# Patient Record
Sex: MALE | Race: BLACK OR AFRICAN AMERICAN | NOT HISPANIC OR LATINO | Employment: OTHER | URBAN - METROPOLITAN AREA
[De-identification: names, ages, dates, MRNs, and addresses within clinical notes are randomized per-mention and may not be internally consistent; named-entity substitution may affect disease eponyms.]

---

## 2017-01-24 PROBLEM — L72.0 EPIDERMAL CYST OF FACE: Status: ACTIVE | Noted: 2017-01-24

## 2017-02-10 PROBLEM — L72.0 EPIDERMAL CYST OF FACE: Status: RESOLVED | Noted: 2017-01-24 | Resolved: 2017-02-10

## 2020-10-25 ENCOUNTER — OFFICE VISIT (OUTPATIENT)
Dept: URGENT CARE | Facility: CLINIC | Age: 45
End: 2020-10-25
Payer: COMMERCIAL

## 2020-10-25 VITALS
OXYGEN SATURATION: 100 % | TEMPERATURE: 97 F | HEIGHT: 70 IN | HEART RATE: 84 BPM | RESPIRATION RATE: 18 BRPM | SYSTOLIC BLOOD PRESSURE: 155 MMHG | WEIGHT: 205 LBS | DIASTOLIC BLOOD PRESSURE: 103 MMHG | BODY MASS INDEX: 29.35 KG/M2

## 2020-10-25 DIAGNOSIS — M54.41 ACUTE MIDLINE LOW BACK PAIN WITH RIGHT-SIDED SCIATICA: Primary | ICD-10-CM

## 2020-10-25 DIAGNOSIS — M54.9 DORSALGIA, UNSPECIFIED: ICD-10-CM

## 2020-10-25 PROCEDURE — 72100 X-RAY EXAM L-S SPINE 2/3 VWS: CPT | Mod: S$GLB,,, | Performed by: RADIOLOGY

## 2020-10-25 PROCEDURE — 96372 PR INJECTION,THERAP/PROPH/DIAG2ST, IM OR SUBCUT: ICD-10-PCS | Mod: S$GLB,,, | Performed by: FAMILY MEDICINE

## 2020-10-25 PROCEDURE — 99204 PR OFFICE/OUTPT VISIT, NEW, LEVL IV, 45-59 MIN: ICD-10-PCS | Mod: 25,S$GLB,, | Performed by: FAMILY MEDICINE

## 2020-10-25 PROCEDURE — 99204 OFFICE O/P NEW MOD 45 MIN: CPT | Mod: 25,S$GLB,, | Performed by: FAMILY MEDICINE

## 2020-10-25 PROCEDURE — 72100 XR LUMBAR SPINE 2 OR 3 VIEWS: ICD-10-PCS | Mod: S$GLB,,, | Performed by: RADIOLOGY

## 2020-10-25 PROCEDURE — 96372 THER/PROPH/DIAG INJ SC/IM: CPT | Mod: S$GLB,,, | Performed by: FAMILY MEDICINE

## 2020-10-25 RX ORDER — KETOROLAC TROMETHAMINE 30 MG/ML
30 INJECTION, SOLUTION INTRAMUSCULAR; INTRAVENOUS ONCE
Status: COMPLETED | OUTPATIENT
Start: 2020-10-25 | End: 2020-10-25

## 2020-10-25 RX ORDER — TRAMADOL HYDROCHLORIDE 50 MG/1
50 TABLET ORAL EVERY 6 HOURS PRN
Qty: 20 TABLET | Refills: 0 | Status: SHIPPED | OUTPATIENT
Start: 2020-10-25 | End: 2021-10-11 | Stop reason: CLARIF

## 2020-10-25 RX ORDER — NAPROXEN 500 MG/1
500 TABLET ORAL 2 TIMES DAILY WITH MEALS
Qty: 20 TABLET | Refills: 0 | Status: SHIPPED | OUTPATIENT
Start: 2020-10-25 | End: 2020-11-11 | Stop reason: ALTCHOICE

## 2020-10-25 RX ORDER — CHLORZOXAZONE 500 MG/1
500 TABLET ORAL 4 TIMES DAILY PRN
Qty: 40 TABLET | Refills: 0 | Status: SHIPPED | OUTPATIENT
Start: 2020-10-25 | End: 2020-11-04

## 2020-10-25 RX ADMIN — KETOROLAC TROMETHAMINE 30 MG: 30 INJECTION, SOLUTION INTRAMUSCULAR; INTRAVENOUS at 11:10

## 2020-10-25 NOTE — PROGRESS NOTES
Subjective:       Patient ID: Junior Garay is a 45 y.o. male.    Chief Complaint: Back Pain    Back Pain  This is a new problem. The current episode started yesterday. The problem occurs constantly. The problem has been gradually worsening since onset. The pain is present in the sacro-iliac. The quality of the pain is described as stabbing. The pain radiates to the right thigh. The pain is at a severity of 9/10. The pain is severe. The pain is the same all the time. The symptoms are aggravated by bending. Stiffness is present in the morning. Associated symptoms include abdominal pain (RLQ) and leg pain (right). Pertinent negatives include no bladder incontinence, bowel incontinence, chest pain, dysuria, fever, headaches, pelvic pain or tingling. He has tried nothing for the symptoms.       Constitution: Negative for fatigue and fever.   HENT: Negative for facial swelling and facial trauma.    Neck: Negative for neck stiffness.   Cardiovascular: Negative for chest trauma and chest pain.   Eyes: Negative for eye trauma, double vision and blurred vision.   Gastrointestinal: Positive for abdominal pain (RLQ). Negative for abdominal trauma, rectal bleeding and bowel incontinence.   Genitourinary: Negative for dysuria, bladder incontinence, hematuria, genital trauma and pelvic pain.   Musculoskeletal: Positive for back pain. Negative for pain, trauma, joint swelling, abnormal ROM of joint and pain with walking.   Skin: Negative for color change, wound, abrasion and laceration.   Neurological: Negative for dizziness, history of vertigo, light-headedness, coordination disturbances, headaches, altered mental status and loss of consciousness.   Hematologic/Lymphatic: Negative for history of bleeding disorder.   Psychiatric/Behavioral: Negative for altered mental status.        Objective:      Physical Exam  Vitals signs and nursing note reviewed.   Constitutional:       General: He is not in acute distress.      Appearance: Normal appearance. He is well-developed. He is not diaphoretic.   HENT:      Head: Normocephalic and atraumatic.      Nose: Nose normal.   Eyes:      General: Lids are normal.      Conjunctiva/sclera: Conjunctivae normal.   Neck:      Musculoskeletal: Full passive range of motion without pain, normal range of motion and neck supple.      Trachea: Trachea and phonation normal.   Cardiovascular:      Rate and Rhythm: Normal rate and regular rhythm.      Pulses: Normal pulses.      Heart sounds: Normal heart sounds.   Pulmonary:      Effort: Pulmonary effort is normal.      Breath sounds: Normal breath sounds.   Abdominal:      General: Bowel sounds are normal. There is no abdominal bruit.      Palpations: Abdomen is soft. There is no mass or pulsatile mass.   Musculoskeletal:         General: No deformity.      Lumbar back: He exhibits decreased range of motion, tenderness, pain and spasm.        Back:    Skin:     General: Skin is warm and dry.   Neurological:      Mental Status: He is alert and oriented to person, place, and time.      Sensory: No sensory deficit.      Deep Tendon Reflexes: Reflexes are normal and symmetric.   Psychiatric:         Speech: Speech normal.         Behavior: Behavior normal. Behavior is cooperative.         Thought Content: Thought content normal.         Judgment: Judgment normal.       Type of Interpretation: ED Physician (Independently Interpreted).  Radiology Procedure Done: Lumbar Spine X-Ray.  Interpretation: No fx, loss of disk space height L4 - S1        Assessment:       1. Acute midline low back pain with right-sided sciatica    2. Dorsalgia, unspecified        Plan:         Medications Ordered This Encounter   Medications    chlorzoxazone (PARAFON FORTE) 500 mg Tab     Sig: Take 1 tablet (500 mg total) by mouth 4 (four) times daily as needed.     Dispense:  40 tablet     Refill:  0    ketorolac injection 30 mg    naproxen (NAPROSYN) 500 MG tablet     Sig: Take  1 tablet (500 mg total) by mouth 2 (two) times daily with meals.     Dispense:  20 tablet     Refill:  0    traMADoL (ULTRAM) 50 mg tablet     Sig: Take 1 tablet (50 mg total) by mouth every 6 (six) hours as needed for Pain.     Dispense:  20 tablet     Refill:  0     Quantity prescribed more than 7 day supply? No     Please drink plenty of fluids.  Please get plenty of rest.  Please return here or go to the Emergency Department for any concerns or worsening of condition.  If you were prescribed a narcotic medication, do not drive or operate heavy equipment or machinery while taking these medications.  If you were not prescribed an anti-inflammatory medication, and if you do not have any history of stomach/intestinal ulcers, or kidney disease, or are not taking a blood thinner such as Coumadin, Plavix, Pradaxa, Eloquis, or Xaralta for example, it is OK to take over the counter Ibuprofen or Advil or Motrin or Aleve as directed.  Do not take these medications on an empty stomach.  If you lose control of your bowel and/or bladder, please go to the nearest Emergency Department immediately.  If you lose sensation in between your legs by your genitalia and/or rectum, please go to the nearest Emergency Department immediately.  If you lose control or sensation of any extremity, please go to the nearest Emergency Department immediately.    Moist heat (heating Pad) several times a day to back for relief and comfort.  If you  smoke, please stop smoking.    Please follow up with your primary care doctor or specialist as needed.  Mark Littlejohn MD  [phone]    You must understand that you have received treatment at an Urgent Care facility only, and that you may be  released before all of your medical problems are known or treated. Urgent Care facilities are not equipped to  handle life threatening emergencies. It is recommended that you seek care at an Emergency Department for  further evaluation of worsening or concerning  symptoms, or possibly life threatening conditions as  discussed.               Follow up in about 1 week (around 11/1/2020) for Recheck.

## 2020-11-01 ENCOUNTER — OFFICE VISIT (OUTPATIENT)
Dept: URGENT CARE | Facility: CLINIC | Age: 45
End: 2020-11-01
Payer: COMMERCIAL

## 2020-11-01 VITALS
TEMPERATURE: 97 F | DIASTOLIC BLOOD PRESSURE: 96 MMHG | HEART RATE: 77 BPM | BODY MASS INDEX: 29.41 KG/M2 | OXYGEN SATURATION: 99 % | SYSTOLIC BLOOD PRESSURE: 140 MMHG | WEIGHT: 205 LBS

## 2020-11-01 DIAGNOSIS — M54.9 MUSCULOSKELETAL BACK PAIN: Primary | ICD-10-CM

## 2020-11-01 PROCEDURE — 99214 PR OFFICE/OUTPT VISIT, EST, LEVL IV, 30-39 MIN: ICD-10-PCS | Mod: S$GLB,,, | Performed by: NURSE PRACTITIONER

## 2020-11-01 PROCEDURE — 99214 OFFICE O/P EST MOD 30 MIN: CPT | Mod: S$GLB,,, | Performed by: NURSE PRACTITIONER

## 2020-11-01 NOTE — PATIENT INSTRUCTIONS
Continue medications prescribed on 10/25/2020. You may return to work on 11/03/2020. A referral has been ordered for you to follow up with a primary care provider.     BACK PAIN    Alternate heat and ice for first 48 hours then  apply heat. You may do gently stretching if tolerable.    Moist warm compresss to area several times daily.  May use a heating pad on LOW to provide heat over a towel which was dampended with warm water. DO NOT FALL ASLEEP WITH HEATING PAD ON.  Do not stay in one position to long.  When sleeping on your back place a pillow under knees to reduce tension on back.  If sleeping on your side, place pillow between knees to keep spine in better alinement.  Wear supportive shoes such as tennis shoes for support of the lower back.  Take any medication as directed.    If you were not prescribed an anti-inflammatory medication, and if you do not have any history of stomach/intestinal ulcers, or kidney disease, or are not taking a blood thinner such as Coumadin, Plavix, Pradaxa, Eloquis, or Xaralta for example, it is OK to take over the counter Ibuprofen or Advil or Motrin or Aleve as directed.  Do not take these medications on an empty stomach.    If you were prescribed a narcotic medication, do not drive or operate heavy equipment or machinery while taking these medications.    If you lose control of your bowel and/or bladder, please go to the nearest Emergency Department immediately.  If you lose sensation in between your legs by your genitalia and/or rectum, please go to the nearest Emergency Department immediately.  If you lose control or sensation of any extremity, please go to the nearest Emergency Department immediately.

## 2020-11-01 NOTE — PROGRESS NOTES
Subjective:       Patient ID: Junior Garay is a 45 y.o. male.    Vitals:  weight is 93 kg (205 lb). His oral temperature is 97.2 °F (36.2 °C). His blood pressure is 140/96 (abnormal) and his pulse is 77. His oxygen saturation is 99%.     Chief Complaint: Back Pain    45 year old male reports he is here for a follow up visit. Pt is complaining of no improvement in back pain and states he would like something stronger than tramadol.     Back Pain  This is a new problem. Episode onset: 1 week. The problem occurs constantly. The problem is unchanged. The pain is present in the lumbar spine. Quality: pins. Radiates to: right leg. The pain is at a severity of 7/10. The pain is moderate. The symptoms are aggravated by lying down (walking). Associated symptoms include abdominal pain (RLQ pain) and tingling. Pertinent negatives include no bladder incontinence, bowel incontinence, dysuria, fever, headaches, leg pain or numbness. Treatments tried: tramadol. The treatment provided mild relief.       Constitution: Negative for fatigue and fever.   Gastrointestinal: Positive for abdominal pain (RLQ pain). Negative for bowel incontinence.   Genitourinary: Negative for dysuria, urgency, bladder incontinence and hematuria.   Musculoskeletal: Positive for pain, back pain and pain with walking. Negative for muscle cramps and history of spine disorder.   Skin: Negative for rash.   Neurological: Positive for tingling. Negative for coordination disturbances, headaches and numbness.       Objective:      Physical Exam   Constitutional: He is oriented to person, place, and time. He appears well-developed. He is cooperative.  Non-toxic appearance. He does not appear ill. No distress.   HENT:   Head: Normocephalic and atraumatic.   Ears:   Right Ear: External ear normal.   Left Ear: External ear normal.   Nose: Nose normal.   Mouth/Throat: Oropharynx is clear and moist and mucous membranes are normal.   Eyes: Conjunctivae and lids are  normal.   Neck: Trachea normal, normal range of motion, full passive range of motion without pain and phonation normal. Neck supple. No muscular tenderness present. No neck rigidity.   Cardiovascular: Normal rate, regular rhythm, normal heart sounds and normal pulses.   Pulmonary/Chest: Effort normal and breath sounds normal. No accessory muscle usage. No respiratory distress.   Abdominal: Soft. Normal appearance and bowel sounds are normal. He exhibits no distension, no abdominal bruit, no pulsatile midline mass and no mass. There is no abdominal tenderness. There is no rebound, no guarding, no tenderness at McBurney's point, no left CVA tenderness and no right CVA tenderness. No hernia.       Musculoskeletal:         General: No deformity.        Back:    Lymphadenopathy:     He has no cervical adenopathy.   Neurological: He is alert and oriented to person, place, and time. He has normal strength and normal reflexes. He displays no weakness and normal reflexes. No sensory deficit. Coordination and gait normal.   Skin: Skin is warm, dry, intact and not diaphoretic. Psychiatric: His speech is normal and behavior is normal. Judgment and thought content normal.   Nursing note and vitals reviewed.        Assessment:       1. Musculoskeletal back pain        Plan:         Musculoskeletal back pain  -     Ambulatory referral/consult to [facility]       Patient presenting for follow up of acute onset back pain .  Xrays reviewed, no evidence for acute pathology.  The patient did not have any urinary incontinence, bowel incontinence, saddle anesthesia, fever, or weight loss that would advanced warrant imaging. Patient was advised to followup with Physical therapy or primary physician if continuing to have back pain. Advised to present to ER if signs of cauda equina or spinal infection develop including loss of bowel or bladder function, peripheral numbness/weakness/tingling, significant fevers, or other concerns. At  time of discharge patient able to ambulate and vitals stable.      Patient Instructions   Continue medications prescribed on 10/25/2020. You may return to work on 11/03/2020. A referral has been ordered for you to follow up with a primary care provider.     BACK PAIN    Alternate heat and ice for first 48 hours then  apply heat. You may do gently stretching if tolerable.    Moist warm compresss to area several times daily.  May use a heating pad on LOW to provide heat over a towel which was dampended with warm water. DO NOT FALL ASLEEP WITH HEATING PAD ON.  Do not stay in one position to long.  When sleeping on your back place a pillow under knees to reduce tension on back.  If sleeping on your side, place pillow between knees to keep spine in better alinement.  Wear supportive shoes such as tennis shoes for support of the lower back.  Take any medication as directed.    If you were not prescribed an anti-inflammatory medication, and if you do not have any history of stomach/intestinal ulcers, or kidney disease, or are not taking a blood thinner such as Coumadin, Plavix, Pradaxa, Eloquis, or Xaralta for example, it is OK to take over the counter Ibuprofen or Advil or Motrin or Aleve as directed.  Do not take these medications on an empty stomach.    If you were prescribed a narcotic medication, do not drive or operate heavy equipment or machinery while taking these medications.    If you lose control of your bowel and/or bladder, please go to the nearest Emergency Department immediately.  If you lose sensation in between your legs by your genitalia and/or rectum, please go to the nearest Emergency Department immediately.  If you lose control or sensation of any extremity, please go to the nearest Emergency Department immediately.

## 2020-11-11 ENCOUNTER — OFFICE VISIT (OUTPATIENT)
Dept: URGENT CARE | Facility: CLINIC | Age: 45
End: 2020-11-11
Payer: COMMERCIAL

## 2020-11-11 VITALS
HEIGHT: 70 IN | HEART RATE: 99 BPM | SYSTOLIC BLOOD PRESSURE: 140 MMHG | TEMPERATURE: 98 F | OXYGEN SATURATION: 98 % | WEIGHT: 205 LBS | RESPIRATION RATE: 16 BRPM | DIASTOLIC BLOOD PRESSURE: 100 MMHG | BODY MASS INDEX: 29.35 KG/M2
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
## 2020-11-11 DIAGNOSIS — M54.41 ACUTE LOW BACK PAIN WITH RIGHT-SIDED SCIATICA, UNSPECIFIED BACK PAIN LATERALITY: Primary | ICD-10-CM

## 2020-11-11 PROCEDURE — 99214 PR OFFICE/OUTPT VISIT, EST, LEVL IV, 30-39 MIN: ICD-10-PCS | Mod: S$GLB,,, | Performed by: PHYSICIAN ASSISTANT

## 2020-11-11 PROCEDURE — 99214 OFFICE O/P EST MOD 30 MIN: CPT | Mod: S$GLB,,, | Performed by: PHYSICIAN ASSISTANT

## 2020-11-11 RX ORDER — HYDROCODONE BITARTRATE AND ACETAMINOPHEN 10; 325 MG/1; MG/1
1 TABLET ORAL EVERY 6 HOURS PRN
Qty: 20 TABLET | Refills: 0 | Status: SHIPPED | OUTPATIENT
Start: 2020-11-11 | End: 2020-11-18

## 2020-11-11 RX ORDER — METHYLPREDNISOLONE 4 MG/1
TABLET ORAL
Qty: 1 PACKAGE | Refills: 0 | Status: SHIPPED | OUTPATIENT
Start: 2020-11-11 | End: 2020-11-25 | Stop reason: SINTOL

## 2020-11-11 RX ORDER — DICLOFENAC SODIUM 75 MG/1
75 TABLET, DELAYED RELEASE ORAL 2 TIMES DAILY
Qty: 30 TABLET | Refills: 0 | Status: SHIPPED | OUTPATIENT
Start: 2020-11-17 | End: 2020-11-25 | Stop reason: SINTOL

## 2020-11-11 RX ORDER — TIZANIDINE 4 MG/1
4 TABLET ORAL 2 TIMES DAILY PRN
Qty: 20 TABLET | Refills: 0 | Status: SHIPPED | OUTPATIENT
Start: 2020-11-11 | End: 2020-11-25 | Stop reason: SDUPTHER

## 2020-11-11 NOTE — PATIENT INSTRUCTIONS
Sciatica    Sciatica is a condition that causes pain in the lower back that spreads down into the buttock, hip, and leg. Sometimes the leg pain can happen without any back pain. Sciatica happens when a spinal nerve is irritated or has pressure put on it as comes out of the spinal canal in the lower back. This most often happens when a bulge or rupture of a nearby spinal disk presses on the nerve. Sciatica can also be caused by a narrowing of the spinal canal (spinal stenosis) or spasm of the muscle in the buttocks that the sciatic nerve passes through (pyriform muscle). Sciatica is also called lumbar radiculopathy.  Sciatica may begin after a sudden twisting or bending force, such as in a car accident. Or it can happen after a simple awkward movement. In either case, muscle spasm often also happens. Muscle spasm makes the pain worse.  A healthcare provider makes a diagnosis of sciatica from your symptoms and a physical exam. Unless you had an injury from a car accident or fall, you usually wont have X-rays taken at this time. This is because the nerves and disks in your back cant be seen on an X-ray. If the provider sees signs of a compressed nerve, you will need to schedule an MRI scan as an outpatient. Signs of a compressed nerve include loss of strength in a leg.  Most sciatica gets better with medicine, exercise, and physical therapy. If your symptoms continue after at least 3 months of medical treatment, you may need surgery or injections to your lower back.  Home care  Follow these tips when caring for yourself at home:  · You may need to stay in bed the first few days. But as soon as possible, begin sitting up or walking. This will help you avoid problems that come from staying in bed for long periods.  · When in bed, try to find a position that is comfortable. A firm mattress is best. Try lying flat on your back with pillows under your knees. You can also try lying on your side with your knees bent up  toward your chest and a pillow between your knees.  · Avoid sitting for long periods. This puts more stress on your lower back than standing or walking.  · Use heat from a hot shower, hot bath, or heating pad to help ease pain. Massage can also help. You can also try using an ice pack. You can make your own ice pack by putting ice cubes in a plastic bag. Wrap the bag in a thin towel. Try both heat and cold to see which works best. Use the method that feels best for 20 minutes several times a day.  · You may use acetaminophen or ibuprofen to ease pain, unless another pain medicine was prescribed. Note: If you have chronic liver or kidney disease, talk with your healthcare provider before taking these medicines. Also talk with your provider if youve had a stomach ulcer or gastrointestinal bleeding.  · Use safe lifting methods. Dont lift anything heavier than 15 pounds until all of the pain is gone.  Follow-up care  Follow up with your healthcare provider, or as advised. You may need physical therapy or additional tests.  If X-rays were taken, a radiologist will look at them. You will be told of any new findings that may affect your care.  When to seek medical advice  Call your healthcare provider right away if any of these occur:  · Pain gets worse even after taking prescribed medicine  · Weakness or numbness in 1 or both legs or hips  · Numbness in your groin or genital area  · You cant control your bowel or bladder  · Fever  · Redness or swelling over your back or spine   Date Last Reviewed: 8/1/2016  © 9023-8158 [employer]. [address] [place] [address], [place] [address]. All rights reserved. This information is not intended as a substitute for professional medical care. Always follow your healthcare professional's instructions.        Back Exercises: Back Press    Do this exercise on your hands and knees. Keep your knees under your hips and your hands under your shoulders. Keep your spine in a neutral  position (not arched or sagging). Be sure to maintain your necks natural curve:  · Tighten your stomach and buttock muscles to press your back upward. Let your head drop slightly.  · Hold for 5 seconds. Return to starting position.  · Repeat 5 times.  Date Last Reviewed: 10/11/2015  © 0499-4178 [employer]. [address] [place] [address]. All rights reserved. This information is not intended as a substitute for professional medical care. Always follow your healthcare professional's instructions.        Back Exercises: Lower Back Stretch    To start, sit in a chair with your feet flat on the floor. Shift your weight slightly forward. Relax, and keep your ears, shoulders, and hips aligned.  · Sit with your feet well apart.  · Bend forward and touch the floor with the backs of your hands. Relax and let your body drop.  · Hold for 20 seconds. Return to starting position.  · Repeat 2 times.   Date Last Reviewed: 8/16/2015  © 1940-0276 [employer]. [address] [place] [address]. All rights reserved. This information is not intended as a substitute for professional medical care. Always follow your healthcare professional's instructions.        Back Exercises: Leg Pull    To start, lie on your back with your knees bent and feet flat on the floor. Dont press your neck or lower back to the floor. Breathe deeply. You should feel comfortable and relaxed in this position.  · Pull one knee to your chest.  · Hold for 30 to 60 seconds. Return to starting position.  · Repeat 2 times.  · Switch legs.  · For a double leg pull, pull both legs to your chest at the same time. Repeat 2 times.  For your safety, check with your healthcare provider before starting an exercise program.   Date Last Reviewed: 8/16/2015  © 5861-6732 [employer]. [address] [place] [address]. All rights reserved. This information is not intended as a substitute for professional  medical care. Always follow your healthcare professional's instructions.

## 2020-11-11 NOTE — LETTER
[facility]  [address] [place] [address]  Phone: [phone]  Fax: [phone]  [facility]: [phone]    Pt Name: Junior Garay  Injury Date: 10/24/2020   Employee ID: [id number] Date of First Treatment: 11/11/2020   Company: [employer]      Appointment Time: 12:45 PM Arrived: 1:23 p.m.   Provider: Yair Crain PA-C Time Out: 3:23 p.m.     Office Treatment:   1. Acute low back pain with right-sided sciatica, unspecified back pain laterality    2. Work related injury      Medications Ordered This Encounter   Medications    diclofenac (VOLTAREN) 75 MG EC tablet    HYDROcodone-acetaminophen (NORCO)  mg per tablet    methylPREDNISolone (MEDROL DOSEPACK) 4 mg tablet    tiZANidine (ZANAFLEX) 4 MG tablet      Patient Instructions: Daily home exercises/warm soaks, PT to be scheduled once authorized    Restrictions: Sedentary work only, Sit or stand as needed, No lifting/pushing/pulling more than 10 lbs, No Prolonged standing/walking     Return Appointment: 11/25/2020 at 9:30 a.m.  [place]

## 2020-11-11 NOTE — PROGRESS NOTES
Subjective:       Patient ID: Junior Garay is a 45 y.o. male.    Chief Complaint: Back Pain    Pt works for [employer] as a  . Pt states he was at work on 10/24/2020 he was at work lifting up a hot water heater by his self because he had no help and he felt a pain pain in his lower back. Pt states he was seen at [facility].  Pt is complaining of no improvement in back pain. Pain 7/10 constant and getting worse. He was given tramadol at the urgent care but states it does not help. IJ     Back Pain  This is a new problem. Episode onset: 10/24/2020. The problem occurs constantly. The problem has been gradually worsening since onset. The pain is present in the lumbar spine. Quality: pins. Radiates to: right leg. The pain is at a severity of 7/10. The pain is moderate. The pain is the same all the time. The symptoms are aggravated by lying down (walking). Stiffness is present all day. Associated symptoms include abdominal pain (RLQ pain), leg pain and tingling. Pertinent negatives include no bladder incontinence, bowel incontinence, chest pain, dysuria, fever, headaches or numbness. Treatments tried: tramadol. The treatment provided no relief.       Constitution: Negative for chills, fatigue and fever.   HENT: Negative for facial trauma.    Neck: Negative for neck pain and neck stiffness.   Cardiovascular: Negative for chest trauma and chest pain.   Eyes: Negative for eye trauma and eye pain.   Respiratory: Negative for shortness of breath.    Gastrointestinal: Positive for abdominal pain (RLQ pain). Negative for abdominal trauma and bowel incontinence.   Genitourinary: Negative for dysuria, urgency, bladder incontinence, hematuria and history of kidney stones.   Musculoskeletal: Positive for pain, back pain and pain with walking. Negative for joint pain, abnormal ROM of joint, muscle cramps and history of spine disorder.   Skin: Negative for rash and wound.   Neurological: Positive  for tingling. Negative for coordination disturbances, headaches and numbness.        Objective:      Physical Exam  Vitals signs and nursing note reviewed.   Constitutional:       General: He is not in acute distress.     Appearance: Normal appearance. He is well-developed.   HENT:      Head: Normocephalic and atraumatic.      Right Ear: Hearing and external ear normal.      Left Ear: Hearing and external ear normal.      Nose: Nose normal. No nasal deformity.   Eyes:      General: Lids are normal.      Conjunctiva/sclera: Conjunctivae normal.      Right eye: Right conjunctiva is not injected.      Left eye: Left conjunctiva is not injected.   Neck:      Musculoskeletal: Normal range of motion. No spinous process tenderness or muscular tenderness.      Trachea: Trachea normal.   Cardiovascular:      Pulses: Normal pulses.           Dorsalis pedis pulses are 2+ on the right side and 2+ on the left side.        Posterior tibial pulses are 2+ on the right side and 2+ on the left side.   Pulmonary:      Effort: Pulmonary effort is normal. No respiratory distress.      Breath sounds: No stridor.   Musculoskeletal:      Cervical back: Normal.      Thoracic back: Normal.      Lumbar back: He exhibits decreased range of motion and tenderness. He exhibits no deformity and normal pulse.        Back:    Skin:     General: Skin is warm and dry.      Findings: No abrasion or bruising.   Neurological:      Mental Status: He is alert.      GCS: GCS eye subscore is 4. GCS verbal subscore is 5. GCS motor subscore is 6.      Sensory: Sensation is intact. No sensory deficit.      Motor: Weakness (RLE hamstring curl 4/5) present.      Deep Tendon Reflexes: Reflexes are normal and symmetric.      Reflex Scores:       Patellar reflexes are 2+ on the right side and 2+ on the left side.       Achilles reflexes are 2+ on the right side and 2+ on the left side.     Comments: SLR positive RLE at 10 degrees, negative LLE   Psychiatric:          Attention and Perception: He is attentive.         Speech: Speech normal.         Behavior: Behavior normal.         Thought Content: Thought content normal.           X-ray Lumbar Spine 2 Or 3 Views    Result Date: 10/25/2020  EXAMINATION: XR LUMBAR SPINE 2 OR 3 VIEWS CLINICAL HISTORY: Back pain or radiculopathy, < 6 wks, uncomplicated;  Dorsalgia, unspecified TECHNIQUE: AP and lateral views as well as images are performed through the lumbar spine. COMPARISON: None FINDINGS: AP, lateral and coned down lateral radiographs of the lumbar spine reveal 5 non-rib bearing lumbar vertebral bodies with normal vertebral body heights and pedicles.  There is redemonstration of loss of intervertebral disc height at L4-L5 and L5-S1.  Anterior spondylosis noted at L4-L5.  There is straightening of the normal lumbar lordosis which is stable.  There is no malalignment, spondylolysis or spondylolisthesis.  There is no significant facet arthropathy.  The surrounding soft tissues are normal.     Stable degenerative disc disease from L4 through S1. Straightening of the normal lumbar lordosis can be seen in muscle spasm.  This is also stable. Electronically signed by: Rayna Gonzalez MD Date:    10/25/2020 Time:    11:05    Assessment:       1. Acute low back pain with right-sided sciatica, unspecified back pain laterality    2. Work related injury        Plan:         Medications Ordered This Encounter   Medications    diclofenac (VOLTAREN) 75 MG EC tablet     Sig: Take 1 tablet (75 mg total) by mouth 2 (two) times daily. Take with food.     Dispense:  30 tablet     Refill:  0     Begin taking once medrol pack completed.    HYDROcodone-acetaminophen (NORCO)  mg per tablet     Sig: Take 1 tablet by mouth every 6 (six) hours as needed for Pain.     Dispense:  20 tablet     Refill:  0     Quantity prescribed more than 7 day supply? No    methylPREDNISolone (MEDROL DOSEPACK) 4 mg tablet     Sig: use as directed     Dispense:  1  Package     Refill:  0    tiZANidine (ZANAFLEX) 4 MG tablet     Sig: Take 1 tablet (4 mg total) by mouth 2 (two) times daily as needed (muscle spasms). Take off duty only. May cause drowsiness.     Dispense:  20 tablet     Refill:  0     Patient Instructions: Daily home exercises/warm soaks, PT to be scheduled once authorized   Restrictions: Sedentary work only, Sit or stand as needed, No lifting/pushing/pulling more than 10 lbs, No Prolonged standing/walking  Follow up in about 2 weeks (around 11/25/2020).

## 2020-11-25 ENCOUNTER — OFFICE VISIT (OUTPATIENT)
Dept: URGENT CARE | Facility: CLINIC | Age: 45
End: 2020-11-25
Payer: COMMERCIAL
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
## 2020-11-25 DIAGNOSIS — M54.41 ACUTE LOW BACK PAIN WITH RIGHT-SIDED SCIATICA, UNSPECIFIED BACK PAIN LATERALITY: Primary | ICD-10-CM

## 2020-11-25 PROCEDURE — 96372 THER/PROPH/DIAG INJ SC/IM: CPT | Mod: S$GLB,,, | Performed by: PHYSICIAN ASSISTANT

## 2020-11-25 PROCEDURE — 99214 OFFICE O/P EST MOD 30 MIN: CPT | Mod: 25,S$GLB,, | Performed by: PHYSICIAN ASSISTANT

## 2020-11-25 PROCEDURE — 99214 PR OFFICE/OUTPT VISIT, EST, LEVL IV, 30-39 MIN: ICD-10-PCS | Mod: 25,S$GLB,, | Performed by: PHYSICIAN ASSISTANT

## 2020-11-25 PROCEDURE — 96372 PR INJECTION,THERAP/PROPH/DIAG2ST, IM OR SUBCUT: ICD-10-PCS | Mod: S$GLB,,, | Performed by: PHYSICIAN ASSISTANT

## 2020-11-25 RX ORDER — CELECOXIB 100 MG/1
100 CAPSULE ORAL 2 TIMES DAILY
Qty: 30 CAPSULE | Refills: 0 | Status: SHIPPED | OUTPATIENT
Start: 2020-11-25 | End: 2020-12-15

## 2020-11-25 RX ORDER — TIZANIDINE 4 MG/1
4 TABLET ORAL 2 TIMES DAILY PRN
Qty: 20 TABLET | Refills: 0 | Status: SHIPPED | OUTPATIENT
Start: 2020-11-25 | End: 2021-01-05 | Stop reason: ALTCHOICE

## 2020-11-25 RX ORDER — BETAMETHASONE SODIUM PHOSPHATE AND BETAMETHASONE ACETATE 3; 3 MG/ML; MG/ML
12 INJECTION, SUSPENSION INTRA-ARTICULAR; INTRALESIONAL; INTRAMUSCULAR; SOFT TISSUE
Status: COMPLETED | OUTPATIENT
Start: 2020-11-25 | End: 2020-11-25

## 2020-11-25 RX ADMIN — BETAMETHASONE SODIUM PHOSPHATE AND BETAMETHASONE ACETATE 12 MG: 3; 3 INJECTION, SUSPENSION INTRA-ARTICULAR; INTRALESIONAL; INTRAMUSCULAR; SOFT TISSUE at 09:11

## 2020-11-25 NOTE — LETTER
[facility]  [address] [address]  Phone: [phone]  Fax: [phone]  [facility]: [id number]    Pt Name: Junior Garay  Injury Date: 10/24/2020   Employee ID: [id number] Date of Treatment: 11/25/2020   Company: [employer]      Appointment Time: 09:15 AM Arrived: 8:45 AM   Provider: Yair Crain PA-C Time Out: 10:00 AM     Office Treatment:     1. Acute low back pain with right-sided sciatica, unspecified back pain laterality    2. Work related injury      Medications Ordered This Encounter   Medications    betamethasone acetate-betamethasone sodium phosphate injection 12 mg    celecoxib (CELEBREX) 100 MG capsule    tiZANidine (ZANAFLEX) 4 MG tablet      Patient Instructions: Daily home exercises/warm soaks, PT to be scheduled once authorized   CALL NII GONZALEZ @ ([phone]) IF YOU DO NOT HEAR FROM PT    Restrictions: Avoid frequent bending/lifting/twisting, Avoid climbing/kneeling/squatting, No lifting/pushing/pulling more than 10 lbs      Return Appointment: 12/10/2020 at 9:00 AM  IJ

## 2020-11-25 NOTE — PROGRESS NOTES
Subjective:       Patient ID: Junior Garay is a 45 y.o. male.    Chief Complaint: No chief complaint on file.    F/U of  Lower back(10/24/2020) - Pt states his injury has not improved since his last visit. He still have a lot of stiffness and aching. Pt states he stopped taking his Diclofenac medication because it makes his vomit. All other medication helps a lot. Pain today 8/10,constant.  ij    Pt reports previous low back injury and had L5 disc procedure in 2007.  At The time patient states he had sciatica with radicular symptoms of the left lower extremity.  This is different in that it is affecting the right lower extremity.  Patient denies bowel or bladder incontinence or saddle paresthesias. MB    Back Pain  This is a recurrent problem. The current episode started 1 to 4 weeks ago (10/24/2020). The problem occurs constantly. The problem has been gradually worsening since onset. The pain is present in the lumbar spine. Quality: pins. Radiates to: right leg. The pain is at a severity of 8/10. The pain is moderate. The pain is the same all the time. The symptoms are aggravated by lying down (walking). Stiffness is present all day. Associated symptoms include leg pain and tingling. Pertinent negatives include no abdominal pain (RLQ pain), bladder incontinence, bowel incontinence, chest pain, dysuria, fever, headaches or numbness. He has tried muscle relaxant and NSAIDs (tramadol) for the symptoms. The treatment provided mild relief.       Constitution: Negative for chills, fatigue and fever.   HENT: Negative for facial trauma.    Neck: Negative for neck pain and neck stiffness.   Cardiovascular: Negative for chest trauma and chest pain.   Eyes: Negative for eye trauma and eye pain.   Respiratory: Negative for shortness of breath.    Gastrointestinal: Negative for abdominal trauma, abdominal pain (RLQ pain) and bowel incontinence.   Genitourinary: Negative for dysuria, urgency,  bladder incontinence, hematuria and history of kidney stones.   Musculoskeletal: Positive for pain, back pain and pain with walking. Negative for joint pain, abnormal ROM of joint, muscle cramps and history of spine disorder.   Skin: Negative for rash and wound.   Neurological: Positive for tingling. Negative for coordination disturbances, headaches and numbness.        Objective:      Physical Exam  Nursing note reviewed.   Constitutional:       General: He is in acute distress (pain).      Appearance: Normal appearance. He is well-developed.   HENT:      Head: Normocephalic and atraumatic.      Right Ear: Hearing and external ear normal.      Left Ear: Hearing and external ear normal.      Nose: Nose normal. No nasal deformity.   Eyes:      General: Lids are normal.      Conjunctiva/sclera: Conjunctivae normal.      Right eye: Right conjunctiva is not injected.      Left eye: Left conjunctiva is not injected.   Neck:      Musculoskeletal: Normal range of motion. No spinous process tenderness or muscular tenderness.      Trachea: Trachea normal.   Cardiovascular:      Pulses: Normal pulses.           Dorsalis pedis pulses are 2+ on the right side and 2+ on the left side.        Posterior tibial pulses are 2+ on the right side and 2+ on the left side.   Pulmonary:      Effort: Pulmonary effort is normal. No respiratory distress.      Breath sounds: No stridor.   Musculoskeletal:      Cervical back: Normal.      Thoracic back: Normal.      Lumbar back: He exhibits decreased range of motion and tenderness. He exhibits no deformity and normal pulse.        Back:    Skin:     General: Skin is warm and dry.      Findings: No abrasion or bruising.   Neurological:      Mental Status: He is alert.      GCS: GCS eye subscore is 4. GCS verbal subscore is 5. GCS motor subscore is 6.      Sensory: Sensation is intact. No sensory deficit.      Motor: Weakness (RLE hamstring curl 4/5) present.      Deep Tendon Reflexes: Reflexes  are normal and symmetric.      Reflex Scores:       Patellar reflexes are 2+ on the right side and 2+ on the left side.       Achilles reflexes are 2+ on the right side and 2+ on the left side.     Comments: Seated SLR positive RLE, negative LLE   Psychiatric:         Attention and Perception: He is attentive.         Speech: Speech normal.         Behavior: Behavior normal.         Thought Content: Thought content normal.         Assessment:       1. Acute low back pain with right-sided sciatica, unspecified back pain laterality    2. Work related injury        Plan:       Pt unable to tolerate p.o. diclofenac or medrol pack due to GI problems. Will try celebrex and give IM celestone.   I sent a message to [facility] regarding PT authorization. Pt instructed to call [facility] if no PT scheduled by next week.         Medications Ordered This Encounter   Medications    betamethasone acetate-betamethasone sodium phosphate injection 12 mg    celecoxib (CELEBREX) 100 MG capsule     Sig: Take 1 capsule (100 mg total) by mouth 2 (two) times daily.     Dispense:  30 capsule     Refill:  0    tiZANidine (ZANAFLEX) 4 MG tablet     Sig: Take 1 tablet (4 mg total) by mouth 2 (two) times daily as needed (muscle spasms). Take off duty only. May cause drowsiness.     Dispense:  20 tablet     Refill:  0     Patient Instructions: Daily home exercises/warm soaks, PT to be scheduled once authorized   Restrictions: Avoid frequent bending/lifting/twisting, Avoid climbing/kneeling/squatting, No lifting/pushing/pulling more than 10 lbs  Follow up in about 15 days (around 12/10/2020) for Dr. Fernando.

## 2020-12-10 ENCOUNTER — TELEPHONE (OUTPATIENT)
Dept: URGENT CARE | Facility: CLINIC | Age: 45
End: 2020-12-10

## 2020-12-10 NOTE — TELEPHONE ENCOUNTER
Patient called to reschedule stating he has been vomiting.  Rescheduled to 12/15/20.  Told to called the day before his appointment if he needs to reschedule again. CT

## 2020-12-15 ENCOUNTER — OFFICE VISIT (OUTPATIENT)
Dept: URGENT CARE | Facility: CLINIC | Age: 45
End: 2020-12-15
Payer: COMMERCIAL

## 2020-12-15 DIAGNOSIS — M54.41 ACUTE LOW BACK PAIN WITH RIGHT-SIDED SCIATICA, UNSPECIFIED BACK PAIN LATERALITY: Primary | ICD-10-CM

## 2020-12-15 DIAGNOSIS — M54.9 BACK PAIN WITH HISTORY OF SPINAL SURGERY: ICD-10-CM

## 2020-12-15 DIAGNOSIS — S33.5XXD LUMBAR SPRAIN, SUBSEQUENT ENCOUNTER: ICD-10-CM

## 2020-12-15 DIAGNOSIS — Z98.890 BACK PAIN WITH HISTORY OF SPINAL SURGERY: ICD-10-CM

## 2020-12-15 PROCEDURE — 99214 PR OFFICE/OUTPT VISIT, EST, LEVL IV, 30-39 MIN: ICD-10-PCS | Mod: S$GLB,,, | Performed by: PREVENTIVE MEDICINE

## 2020-12-15 PROCEDURE — 99214 OFFICE O/P EST MOD 30 MIN: CPT | Mod: S$GLB,,, | Performed by: PREVENTIVE MEDICINE

## 2020-12-15 RX ORDER — METHOCARBAMOL 500 MG/1
500 TABLET, FILM COATED ORAL NIGHTLY
Qty: 30 TABLET | Refills: 1 | Status: SHIPPED | OUTPATIENT
Start: 2020-12-15 | End: 2021-02-10 | Stop reason: SDUPTHER

## 2020-12-15 RX ORDER — ETODOLAC 400 MG/1
400 TABLET, FILM COATED ORAL 2 TIMES DAILY
Qty: 40 TABLET | Refills: 1 | Status: SHIPPED | OUTPATIENT
Start: 2020-12-15 | End: 2021-01-05 | Stop reason: ALTCHOICE

## 2020-12-15 NOTE — LETTER
[facility]  [address] [address]  Phone: [phone]  Fax: [phone]  [facility]: [id number]    Pt Name: Junior Garay  Injury Date: 10/24/2020   Employee ID: [id number] Date of Treatment: 12/15/2020   Company: [employer]      Appointment Time: 11:45 AM Arrived:  11:00 AM   Provider: Amaury Fernando MD Time Out: 12:20 PM     Office Treatment:     1. Acute low back pain with right-sided sciatica, unspecified back pain laterality    2. Back pain with history of spinal surgery    3. Lumbar sprain, subsequent encounter      Medications Ordered This Encounter   Medications    etodolac (LODINE) 400 MG tablet    methocarbamoL (ROBAXIN) 500 MG Tab      Patient Instructions: Daily home exercises/warm soaks, PT to be scheduled once authorized, Begin Physical Therapy    Restrictions: Disabled until next office visit     Return Appointment: 12/17/2020 at 3:00 PM  ROSARIO

## 2020-12-15 NOTE — PROGRESS NOTES
Subjective:       Patient ID: Junior Garay is a 45 y.o. male.    Chief Complaint: Back Pain (Lower)    RV, Lower Back (DOI 10/24/2020) PHI- Patient states his back hurts worse today may be due to the weather. He has an aching and sharp pain radiating down his right leg. He states it is hard tto stand and walk. Pain level 9/10 on today. He states his physical therapy was approved on 11/18/2020 but never received a call to schedule the therapy. Currently taking Celebrex and Tizanidine which is not working. [place]    Back Pain  This is a recurrent problem. The current episode started more than 1 month ago. The problem occurs constantly. The problem has been gradually worsening since onset. The pain is present in the lumbar spine. The quality of the pain is described as aching and shooting (sharp). The pain radiates to the right thigh. The pain is at a severity of 9/10. The pain is severe. The pain is the same all the time. The symptoms are aggravated by lying down and standing. Stiffness is present in the morning. Associated symptoms include leg pain. Pertinent negatives include no abdominal pain, bladder incontinence, bowel incontinence, chest pain, dysuria, fever, headaches, numbness, paresis, paresthesias, pelvic pain, perianal numbness, tingling, weakness or weight loss. He has tried NSAIDs, muscle relaxant and heat for the symptoms. The treatment provided mild relief.       Constitution: Negative for fatigue and fever.   HENT: Negative.    Neck: negative.   Cardiovascular: Negative.  Negative for chest pain.   Eyes: Negative.    Respiratory: Negative.    Gastrointestinal: Negative for abdominal pain and bowel incontinence.   Endocrine: negative.   Genitourinary: Negative for dysuria, urgency, bladder incontinence, hematuria and pelvic pain.   Musculoskeletal: Positive for pain, abnormal ROM of joint, back pain, pain with walking, muscle cramps and muscle ache. Negative for history of spine disorder.   Skin:  Negative for rash.   Allergic/Immunologic: Negative.    Neurological: Negative for coordination disturbances, headaches, numbness and tingling.   Hematologic/Lymphatic: Negative.    Psychiatric/Behavioral: Negative.         Objective:      Physical Exam  Vitals signs and nursing note reviewed.   Constitutional:       Appearance: Normal appearance. He is well-developed.   HENT:      Head: Normocephalic and atraumatic.      Nose: Nose normal.   Eyes:      Pupils: Pupils are equal, round, and reactive to light.   Neck:      Musculoskeletal: Normal range of motion and neck supple.   Cardiovascular:      Rate and Rhythm: Normal rate and regular rhythm.   Pulmonary:      Effort: Pulmonary effort is normal.   Musculoskeletal:      Lumbar back: He exhibits decreased range of motion, tenderness and pain. He exhibits no bony tenderness, no swelling, no edema, no deformity, no laceration, no spasm and normal pulse.        Back:       Comments: Patient has complaints of pain with both palpation and range of motion testing of his lower back.  There is a well-healed scar about his midline of the lower back from previous back surgery.  He has pain with light touch across his lower back radiating to his buttock.  No swelling spasm or ecchymosis is noted in this area.  He has complaints of pain with minimal flexion to less than 25°, extension less than 10°, and and inability to laterally bend.  He has no evidence of any objective neurologic deficits about his lower extremities with deep tendon reflexes 1+ and equal.  His straight leg raising examination is equal and very limited bilaterally.    Patient has evidence of symptom magnification with complaints of pain on light touch across his lower back as noted above.  He also has complaints of pain with axial loading, pseudo trunk rotation, and with plantar dorsiflexion of both his ankles while in a seated position.       Skin:     General: Skin is warm and dry.   Neurological:       Mental Status: He is alert and oriented to person, place, and time.         Assessment:       1. Acute low back pain with right-sided sciatica, unspecified back pain laterality    2. Back pain with history of spinal surgery    3. Lumbar sprain, subsequent encounter        Plan:         Medications Ordered This Encounter   Medications    etodolac (LODINE) 400 MG tablet     Sig: Take 1 tablet (400 mg total) by mouth 2 (two) times daily.     Dispense:  40 tablet     Refill:  1    methocarbamoL (ROBAXIN) 500 MG Tab     Sig: Take 1 tablet (500 mg total) by mouth nightly.     Dispense:  30 tablet     Refill:  1     Patient Instructions: Daily home exercises/warm soaks, PT to be scheduled once authorized, Begin Physical Therapy   Restrictions: Disabled until next office visit  No follow-ups on file.

## 2020-12-17 ENCOUNTER — OFFICE VISIT (OUTPATIENT)
Dept: URGENT CARE | Facility: CLINIC | Age: 45
End: 2020-12-17
Payer: COMMERCIAL

## 2020-12-17 DIAGNOSIS — M54.9 BACK PAIN WITH HISTORY OF SPINAL SURGERY: ICD-10-CM

## 2020-12-17 DIAGNOSIS — Z98.890 BACK PAIN WITH HISTORY OF SPINAL SURGERY: ICD-10-CM

## 2020-12-17 DIAGNOSIS — M54.41 ACUTE LOW BACK PAIN WITH RIGHT-SIDED SCIATICA, UNSPECIFIED BACK PAIN LATERALITY: Primary | ICD-10-CM

## 2020-12-17 DIAGNOSIS — S33.5XXD LUMBAR SPRAIN, SUBSEQUENT ENCOUNTER: ICD-10-CM

## 2020-12-17 PROCEDURE — 99214 PR OFFICE/OUTPT VISIT, EST, LEVL IV, 30-39 MIN: ICD-10-PCS | Mod: S$GLB,,, | Performed by: PREVENTIVE MEDICINE

## 2020-12-17 PROCEDURE — 99214 OFFICE O/P EST MOD 30 MIN: CPT | Mod: S$GLB,,, | Performed by: PREVENTIVE MEDICINE

## 2020-12-17 NOTE — LETTER
[facility]  [place]  [address] [address]  Phone: [phone]  Fax: [phone]  [facility]: [id number]    Pt Name: Junior Garay  Injury Date: 10/24/2020   Employee ID: [id number] Date of Treatment: 12/17/2020   Company: [employer]      Appointment Time: 02:45 PM Arrived: 1:45 PM   Provider: Amaury Fernando MD Time Out: 2:50 PM     Office Treatment:     1. Acute low back pain with right-sided sciatica, unspecified back pain laterality    2. Back pain with history of spinal surgery    3. Lumbar sprain, subsequent encounter          Patient Instructions: Daily home exercises/warm soaks, Begin Physical Therapy(Patient is scheduled to begin physical therapy at an [facility] location on December 22nd)    Restrictions: Disabled until next office visit     Return Appointment: 01/05/2021 at 10:30 AM  IJ

## 2020-12-17 NOTE — PROGRESS NOTES
Subjective:       Patient ID: Junior Garay is a 45 y.o. male.    Chief Complaint: Back Pain    RV, Lower Back (DOI 10/24/2020) PHI- Patient states his back hurts worse today may be due to the weather. He has an aching and sharp pain radiating down his right leg. He states it is hard tto stand and walk. Pain level 9/10 on today. Currently taking Celebrex and Tizanidine which is not working. IJ    Back Pain  This is a recurrent problem. The current episode started more than 1 month ago. The problem occurs constantly. The problem has been gradually worsening since onset. The pain is present in the lumbar spine. The quality of the pain is described as aching and shooting (sharp). The pain radiates to the right thigh. The pain is at a severity of 9/10. The pain is severe. The pain is the same all the time. The symptoms are aggravated by lying down and standing. Stiffness is present in the morning. Associated symptoms include leg pain. Pertinent negatives include no abdominal pain, bladder incontinence, bowel incontinence, chest pain, dysuria, fever, headaches, numbness, paresis, paresthesias, pelvic pain, perianal numbness, tingling, weakness or weight loss. He has tried NSAIDs, muscle relaxant and heat for the symptoms. The treatment provided mild relief.       Constitution: Negative for fatigue and fever.   HENT: Negative.    Neck: negative.   Cardiovascular: Negative.  Negative for chest pain.   Eyes: Negative.    Respiratory: Negative.    Gastrointestinal: Negative for abdominal pain and bowel incontinence.   Endocrine: negative.   Genitourinary: Negative for dysuria, urgency, bladder incontinence, hematuria and pelvic pain.   Musculoskeletal: Positive for pain, abnormal ROM of joint, back pain, pain with walking, muscle cramps and muscle ache. Negative for history of spine disorder.   Skin: Negative for rash.   Allergic/Immunologic: Negative.    Neurological: Negative for coordination disturbances, headaches,  numbness and tingling.   Hematologic/Lymphatic: Negative.    Psychiatric/Behavioral: Negative.         Objective:      Physical Exam  Vitals signs and nursing note reviewed.   Constitutional:       Appearance: Normal appearance. He is well-developed.   HENT:      Head: Normocephalic and atraumatic.      Nose: Nose normal.   Eyes:      Pupils: Pupils are equal, round, and reactive to light.   Neck:      Musculoskeletal: Normal range of motion and neck supple.   Cardiovascular:      Rate and Rhythm: Normal rate and regular rhythm.   Pulmonary:      Effort: Pulmonary effort is normal.   Musculoskeletal:      Lumbar back: He exhibits decreased range of motion, tenderness and pain. He exhibits no bony tenderness, no swelling, no edema, no deformity, no laceration, no spasm and normal pulse.        Back:       Comments: Patient has complaints of pain with both palpation and range of motion testing of his lower back.  There is a well-healed scar about his midline of the lower back from previous back surgery.  He has pain with light touch across his lower back radiating to his buttock.  No swelling spasm or ecchymosis is noted in this area.  He has complaints of pain with minimal flexion to less than 25°, extension less than 10°, and and inability to laterally bend.  He has no evidence of any objective neurologic deficits about his lower extremities with deep tendon reflexes 1+ and equal.  His straight leg raising examination is equal and very limited bilaterally.    Patient has evidence of symptom magnification with complaints of pain on light touch across his lower back as noted above.  He also has complaints of pain with axial loading, pseudo trunk rotation, and with plantar dorsiflexion of both his ankles while in a seated position.       Skin:     General: Skin is warm and dry.   Neurological:      Mental Status: He is alert and oriented to person, place, and time.         Assessment:       1. Acute low back pain with  right-sided sciatica, unspecified back pain laterality    2. Back pain with history of spinal surgery    3. Lumbar sprain, subsequent encounter        Plan:     patient has now been scheduled to begin physical therapy for his lower back at an [facility] location.  He will continue this as well as exercises with warm soaks to his lower back daily.  He will continue taking etodolac 400 mg twice a day with food and Robaxin 500 mg at night.  If his symptoms are not resolving with physical therapy he may require an MRI of the lumbosacral spine.         Patient Instructions: Daily home exercises/warm soaks, Begin Physical Therapy(Patient is scheduled to begin physical therapy at an [facility] location on December 22nd)   Restrictions: Disabled until next office visit  Follow up in about 19 days (around 1/5/2021).

## 2021-01-05 ENCOUNTER — OFFICE VISIT (OUTPATIENT)
Dept: URGENT CARE | Facility: CLINIC | Age: 46
End: 2021-01-05
Payer: COMMERCIAL

## 2021-01-05 DIAGNOSIS — M54.9 DORSALGIA, UNSPECIFIED: ICD-10-CM

## 2021-01-05 DIAGNOSIS — M54.41 ACUTE LOW BACK PAIN WITH RIGHT-SIDED SCIATICA, UNSPECIFIED BACK PAIN LATERALITY: Primary | ICD-10-CM

## 2021-01-05 DIAGNOSIS — M54.9 BACK PAIN WITH HISTORY OF SPINAL SURGERY: ICD-10-CM

## 2021-01-05 DIAGNOSIS — Z98.890 BACK PAIN WITH HISTORY OF SPINAL SURGERY: ICD-10-CM

## 2021-01-05 DIAGNOSIS — S33.5XXD LUMBAR SPRAIN, SUBSEQUENT ENCOUNTER: ICD-10-CM

## 2021-01-05 PROCEDURE — 99214 PR OFFICE/OUTPT VISIT, EST, LEVL IV, 30-39 MIN: ICD-10-PCS | Mod: S$GLB,,, | Performed by: PREVENTIVE MEDICINE

## 2021-01-05 PROCEDURE — 99214 OFFICE O/P EST MOD 30 MIN: CPT | Mod: S$GLB,,, | Performed by: PREVENTIVE MEDICINE

## 2021-01-05 RX ORDER — ORPHENADRINE CITRATE 100 MG/1
100 TABLET, EXTENDED RELEASE ORAL NIGHTLY PRN
Qty: 30 TABLET | Refills: 1 | Status: SHIPPED | OUTPATIENT
Start: 2021-01-05 | End: 2021-01-12 | Stop reason: ALTCHOICE

## 2021-01-05 RX ORDER — NAPROXEN 500 MG/1
500 TABLET ORAL 2 TIMES DAILY WITH MEALS
Qty: 40 TABLET | Refills: 1 | Status: SHIPPED | OUTPATIENT
Start: 2021-01-05 | End: 2021-02-10 | Stop reason: SDUPTHER

## 2021-01-12 ENCOUNTER — OFFICE VISIT (OUTPATIENT)
Dept: URGENT CARE | Facility: CLINIC | Age: 46
End: 2021-01-12
Payer: COMMERCIAL

## 2021-01-12 DIAGNOSIS — M54.9 BACK PAIN WITH HISTORY OF SPINAL SURGERY: ICD-10-CM

## 2021-01-12 DIAGNOSIS — S33.5XXD LUMBAR SPRAIN, SUBSEQUENT ENCOUNTER: ICD-10-CM

## 2021-01-12 DIAGNOSIS — M54.41 ACUTE LOW BACK PAIN WITH RIGHT-SIDED SCIATICA, UNSPECIFIED BACK PAIN LATERALITY: Primary | ICD-10-CM

## 2021-01-12 DIAGNOSIS — Z98.890 BACK PAIN WITH HISTORY OF SPINAL SURGERY: ICD-10-CM

## 2021-01-12 DIAGNOSIS — M54.9 DORSALGIA, UNSPECIFIED: ICD-10-CM

## 2021-01-12 PROCEDURE — 99214 OFFICE O/P EST MOD 30 MIN: CPT | Mod: S$GLB,,, | Performed by: PREVENTIVE MEDICINE

## 2021-01-12 PROCEDURE — 99214 PR OFFICE/OUTPT VISIT, EST, LEVL IV, 30-39 MIN: ICD-10-PCS | Mod: S$GLB,,, | Performed by: PREVENTIVE MEDICINE

## 2021-01-12 RX ORDER — TIZANIDINE 4 MG/1
4 TABLET ORAL EVERY 6 HOURS PRN
Qty: 30 TABLET | Refills: 1 | Status: SHIPPED | OUTPATIENT
Start: 2021-01-12 | End: 2021-01-22

## 2021-01-19 ENCOUNTER — OFFICE VISIT (OUTPATIENT)
Dept: URGENT CARE | Facility: CLINIC | Age: 46
End: 2021-01-19
Payer: COMMERCIAL

## 2021-01-19 DIAGNOSIS — Z98.890 BACK PAIN WITH HISTORY OF SPINAL SURGERY: ICD-10-CM

## 2021-01-19 DIAGNOSIS — M54.9 DORSALGIA, UNSPECIFIED: ICD-10-CM

## 2021-01-19 DIAGNOSIS — M54.9 BACK PAIN WITH HISTORY OF SPINAL SURGERY: ICD-10-CM

## 2021-01-19 DIAGNOSIS — S33.5XXD LUMBAR SPRAIN, SUBSEQUENT ENCOUNTER: ICD-10-CM

## 2021-01-19 DIAGNOSIS — M54.41 ACUTE LOW BACK PAIN WITH RIGHT-SIDED SCIATICA, UNSPECIFIED BACK PAIN LATERALITY: Primary | ICD-10-CM

## 2021-01-19 PROCEDURE — 99214 OFFICE O/P EST MOD 30 MIN: CPT | Mod: S$GLB,,, | Performed by: PREVENTIVE MEDICINE

## 2021-01-19 PROCEDURE — 99214 PR OFFICE/OUTPT VISIT, EST, LEVL IV, 30-39 MIN: ICD-10-PCS | Mod: S$GLB,,, | Performed by: PREVENTIVE MEDICINE

## 2021-01-26 ENCOUNTER — OFFICE VISIT (OUTPATIENT)
Dept: URGENT CARE | Facility: CLINIC | Age: 46
End: 2021-01-26
Payer: COMMERCIAL

## 2021-01-26 DIAGNOSIS — M54.9 BACK PAIN WITH HISTORY OF SPINAL SURGERY: ICD-10-CM

## 2021-01-26 DIAGNOSIS — Z98.890 BACK PAIN WITH HISTORY OF SPINAL SURGERY: ICD-10-CM

## 2021-01-26 DIAGNOSIS — M54.41 ACUTE LOW BACK PAIN WITH RIGHT-SIDED SCIATICA, UNSPECIFIED BACK PAIN LATERALITY: Primary | ICD-10-CM

## 2021-01-26 DIAGNOSIS — M54.9 DORSALGIA, UNSPECIFIED: ICD-10-CM

## 2021-01-26 DIAGNOSIS — S33.5XXD LUMBAR SPRAIN, SUBSEQUENT ENCOUNTER: ICD-10-CM

## 2021-01-26 PROCEDURE — 99214 PR OFFICE/OUTPT VISIT, EST, LEVL IV, 30-39 MIN: ICD-10-PCS | Mod: S$GLB,,, | Performed by: PREVENTIVE MEDICINE

## 2021-01-26 PROCEDURE — 99214 OFFICE O/P EST MOD 30 MIN: CPT | Mod: S$GLB,,, | Performed by: PREVENTIVE MEDICINE

## 2021-02-02 ENCOUNTER — OFFICE VISIT (OUTPATIENT)
Dept: URGENT CARE | Facility: CLINIC | Age: 46
End: 2021-02-02
Payer: COMMERCIAL

## 2021-02-02 DIAGNOSIS — M54.41 ACUTE LOW BACK PAIN WITH RIGHT-SIDED SCIATICA, UNSPECIFIED BACK PAIN LATERALITY: Primary | ICD-10-CM
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
## 2021-02-02 DIAGNOSIS — S33.5XXD LUMBAR SPRAIN, SUBSEQUENT ENCOUNTER: ICD-10-CM

## 2021-02-02 DIAGNOSIS — Z98.890 BACK PAIN WITH HISTORY OF SPINAL SURGERY: ICD-10-CM

## 2021-02-02 DIAGNOSIS — M54.9 BACK PAIN WITH HISTORY OF SPINAL SURGERY: ICD-10-CM

## 2021-02-02 DIAGNOSIS — M54.9 DORSALGIA, UNSPECIFIED: ICD-10-CM

## 2021-02-02 PROCEDURE — 99214 OFFICE O/P EST MOD 30 MIN: CPT | Mod: S$GLB,,, | Performed by: PREVENTIVE MEDICINE

## 2021-02-02 PROCEDURE — 99214 PR OFFICE/OUTPT VISIT, EST, LEVL IV, 30-39 MIN: ICD-10-PCS | Mod: S$GLB,,, | Performed by: PREVENTIVE MEDICINE

## 2021-02-10 ENCOUNTER — OFFICE VISIT (OUTPATIENT)
Dept: URGENT CARE | Facility: CLINIC | Age: 46
End: 2021-02-10
Payer: COMMERCIAL

## 2021-02-10 DIAGNOSIS — M54.9 DORSALGIA, UNSPECIFIED: ICD-10-CM

## 2021-02-10 DIAGNOSIS — M54.41 ACUTE LOW BACK PAIN WITH RIGHT-SIDED SCIATICA, UNSPECIFIED BACK PAIN LATERALITY: Primary | ICD-10-CM

## 2021-02-10 DIAGNOSIS — Z98.890 BACK PAIN WITH HISTORY OF SPINAL SURGERY: ICD-10-CM

## 2021-02-10 DIAGNOSIS — S33.5XXD LUMBAR SPRAIN, SUBSEQUENT ENCOUNTER: ICD-10-CM

## 2021-02-10 DIAGNOSIS — M54.9 BACK PAIN WITH HISTORY OF SPINAL SURGERY: ICD-10-CM

## 2021-02-10 PROCEDURE — 99214 OFFICE O/P EST MOD 30 MIN: CPT | Mod: S$GLB,,, | Performed by: PREVENTIVE MEDICINE

## 2021-02-10 PROCEDURE — 99214 PR OFFICE/OUTPT VISIT, EST, LEVL IV, 30-39 MIN: ICD-10-PCS | Mod: S$GLB,,, | Performed by: PREVENTIVE MEDICINE

## 2021-02-10 RX ORDER — METHOCARBAMOL 500 MG/1
500 TABLET, FILM COATED ORAL NIGHTLY
Qty: 30 TABLET | Refills: 1 | Status: SHIPPED | OUTPATIENT
Start: 2021-02-10 | End: 2021-03-18 | Stop reason: SDUPTHER

## 2021-02-10 RX ORDER — NAPROXEN 500 MG/1
500 TABLET ORAL 2 TIMES DAILY WITH MEALS
Qty: 40 TABLET | Refills: 1 | Status: SHIPPED | OUTPATIENT
Start: 2021-02-10 | End: 2021-03-18 | Stop reason: SDUPTHER

## 2021-02-22 ENCOUNTER — OFFICE VISIT (OUTPATIENT)
Dept: URGENT CARE | Facility: CLINIC | Age: 46
End: 2021-02-22
Payer: COMMERCIAL

## 2021-02-22 DIAGNOSIS — M54.9 DORSALGIA, UNSPECIFIED: ICD-10-CM
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
## 2021-02-22 DIAGNOSIS — S33.5XXD LUMBAR SPRAIN, SUBSEQUENT ENCOUNTER: ICD-10-CM

## 2021-02-22 DIAGNOSIS — M54.9 BACK PAIN WITH HISTORY OF SPINAL SURGERY: ICD-10-CM

## 2021-02-22 DIAGNOSIS — Z98.890 BACK PAIN WITH HISTORY OF SPINAL SURGERY: ICD-10-CM

## 2021-02-22 DIAGNOSIS — M54.41 ACUTE LOW BACK PAIN WITH RIGHT-SIDED SCIATICA, UNSPECIFIED BACK PAIN LATERALITY: Primary | ICD-10-CM

## 2021-02-22 PROCEDURE — 99214 PR OFFICE/OUTPT VISIT, EST, LEVL IV, 30-39 MIN: ICD-10-PCS | Mod: S$GLB,,, | Performed by: PREVENTIVE MEDICINE

## 2021-02-22 PROCEDURE — 99214 OFFICE O/P EST MOD 30 MIN: CPT | Mod: S$GLB,,, | Performed by: PREVENTIVE MEDICINE

## 2021-02-22 RX ORDER — DIAZEPAM 5 MG/1
5 TABLET ORAL EVERY 6 HOURS PRN
Qty: 4 TABLET | Refills: 0 | Status: SHIPPED | OUTPATIENT
Start: 2021-02-22 | End: 2021-03-09 | Stop reason: SDUPTHER

## 2021-03-09 ENCOUNTER — OFFICE VISIT (OUTPATIENT)
Dept: URGENT CARE | Facility: CLINIC | Age: 46
End: 2021-03-09
Payer: COMMERCIAL

## 2021-03-09 DIAGNOSIS — M54.9 DORSALGIA, UNSPECIFIED: ICD-10-CM

## 2021-03-09 DIAGNOSIS — S33.5XXD LUMBAR SPRAIN, SUBSEQUENT ENCOUNTER: ICD-10-CM

## 2021-03-09 DIAGNOSIS — M54.41 ACUTE LOW BACK PAIN WITH RIGHT-SIDED SCIATICA, UNSPECIFIED BACK PAIN LATERALITY: Primary | ICD-10-CM

## 2021-03-09 DIAGNOSIS — Z98.890 BACK PAIN WITH HISTORY OF SPINAL SURGERY: ICD-10-CM

## 2021-03-09 DIAGNOSIS — M54.9 BACK PAIN WITH HISTORY OF SPINAL SURGERY: ICD-10-CM

## 2021-03-09 PROCEDURE — 99214 OFFICE O/P EST MOD 30 MIN: CPT | Mod: S$GLB,,, | Performed by: PREVENTIVE MEDICINE

## 2021-03-09 PROCEDURE — 99214 PR OFFICE/OUTPT VISIT, EST, LEVL IV, 30-39 MIN: ICD-10-PCS | Mod: S$GLB,,, | Performed by: PREVENTIVE MEDICINE

## 2021-03-09 RX ORDER — DIAZEPAM 5 MG/1
5 TABLET ORAL EVERY 6 HOURS PRN
Qty: 4 TABLET | Refills: 0 | Status: SHIPPED | OUTPATIENT
Start: 2021-03-09 | End: 2022-01-31

## 2021-03-18 ENCOUNTER — OFFICE VISIT (OUTPATIENT)
Dept: URGENT CARE | Facility: CLINIC | Age: 46
End: 2021-03-18
Payer: COMMERCIAL

## 2021-03-18 DIAGNOSIS — S33.5XXD LUMBAR SPRAIN, SUBSEQUENT ENCOUNTER: ICD-10-CM

## 2021-03-18 DIAGNOSIS — M54.41 ACUTE LOW BACK PAIN WITH RIGHT-SIDED SCIATICA, UNSPECIFIED BACK PAIN LATERALITY: Primary | ICD-10-CM

## 2021-03-18 DIAGNOSIS — M54.9 BACK PAIN WITH HISTORY OF SPINAL SURGERY: ICD-10-CM

## 2021-03-18 DIAGNOSIS — M54.9 DORSALGIA, UNSPECIFIED: ICD-10-CM

## 2021-03-18 DIAGNOSIS — Z98.890 BACK PAIN WITH HISTORY OF SPINAL SURGERY: ICD-10-CM

## 2021-03-18 PROCEDURE — 99214 OFFICE O/P EST MOD 30 MIN: CPT | Mod: S$GLB,,, | Performed by: PREVENTIVE MEDICINE

## 2021-03-18 PROCEDURE — 99214 PR OFFICE/OUTPT VISIT, EST, LEVL IV, 30-39 MIN: ICD-10-PCS | Mod: S$GLB,,, | Performed by: PREVENTIVE MEDICINE

## 2021-03-18 RX ORDER — METHOCARBAMOL 500 MG/1
500 TABLET, FILM COATED ORAL NIGHTLY
Qty: 30 TABLET | Refills: 1 | Status: SHIPPED | OUTPATIENT
Start: 2021-03-18 | End: 2021-04-20 | Stop reason: SDUPTHER

## 2021-03-18 RX ORDER — NAPROXEN 500 MG/1
500 TABLET ORAL 2 TIMES DAILY WITH MEALS
Qty: 40 TABLET | Refills: 1 | Status: SHIPPED | OUTPATIENT
Start: 2021-03-18 | End: 2021-04-20 | Stop reason: SDUPTHER

## 2021-03-30 ENCOUNTER — OFFICE VISIT (OUTPATIENT)
Dept: URGENT CARE | Facility: CLINIC | Age: 46
End: 2021-03-30
Payer: COMMERCIAL

## 2021-03-30 DIAGNOSIS — M54.41 ACUTE LOW BACK PAIN WITH RIGHT-SIDED SCIATICA, UNSPECIFIED BACK PAIN LATERALITY: Primary | ICD-10-CM

## 2021-03-30 DIAGNOSIS — M54.9 BACK PAIN WITH HISTORY OF SPINAL SURGERY: ICD-10-CM

## 2021-03-30 DIAGNOSIS — M54.9 DORSALGIA, UNSPECIFIED: ICD-10-CM

## 2021-03-30 DIAGNOSIS — M51.26 HERNIATED INTERVERTEBRAL DISC OF LUMBAR SPINE: ICD-10-CM

## 2021-03-30 DIAGNOSIS — S33.5XXD LUMBAR SPRAIN, SUBSEQUENT ENCOUNTER: ICD-10-CM

## 2021-03-30 DIAGNOSIS — Z98.890 BACK PAIN WITH HISTORY OF SPINAL SURGERY: ICD-10-CM

## 2021-03-30 PROCEDURE — 99214 OFFICE O/P EST MOD 30 MIN: CPT | Mod: S$GLB,,, | Performed by: PREVENTIVE MEDICINE

## 2021-03-30 PROCEDURE — 99214 PR OFFICE/OUTPT VISIT, EST, LEVL IV, 30-39 MIN: ICD-10-PCS | Mod: S$GLB,,, | Performed by: PREVENTIVE MEDICINE

## 2021-04-07 ENCOUNTER — TELEPHONE (OUTPATIENT)
Dept: NEUROSURGERY | Facility: CLINIC | Age: 46
End: 2021-04-07

## 2021-04-20 ENCOUNTER — OFFICE VISIT (OUTPATIENT)
Dept: URGENT CARE | Facility: CLINIC | Age: 46
End: 2021-04-20
Payer: COMMERCIAL

## 2021-04-20 DIAGNOSIS — M54.41 ACUTE LOW BACK PAIN WITH RIGHT-SIDED SCIATICA, UNSPECIFIED BACK PAIN LATERALITY: Primary | ICD-10-CM

## 2021-04-20 DIAGNOSIS — M54.9 BACK PAIN WITH HISTORY OF SPINAL SURGERY: ICD-10-CM

## 2021-04-20 DIAGNOSIS — Z98.890 BACK PAIN WITH HISTORY OF SPINAL SURGERY: ICD-10-CM

## 2021-04-20 DIAGNOSIS — M51.26 HERNIATED INTERVERTEBRAL DISC OF LUMBAR SPINE: ICD-10-CM

## 2021-04-20 DIAGNOSIS — M54.9 DORSALGIA, UNSPECIFIED: ICD-10-CM

## 2021-04-20 DIAGNOSIS — S33.5XXD LUMBAR SPRAIN, SUBSEQUENT ENCOUNTER: ICD-10-CM

## 2021-04-20 PROCEDURE — 99214 PR OFFICE/OUTPT VISIT, EST, LEVL IV, 30-39 MIN: ICD-10-PCS | Mod: S$GLB,,, | Performed by: PREVENTIVE MEDICINE

## 2021-04-20 PROCEDURE — 99214 OFFICE O/P EST MOD 30 MIN: CPT | Mod: S$GLB,,, | Performed by: PREVENTIVE MEDICINE

## 2021-04-20 RX ORDER — NAPROXEN 500 MG/1
500 TABLET ORAL 2 TIMES DAILY WITH MEALS
Qty: 60 TABLET | Refills: 1 | Status: SHIPPED | OUTPATIENT
Start: 2021-04-20 | End: 2022-04-20

## 2021-04-20 RX ORDER — METHOCARBAMOL 500 MG/1
500 TABLET, FILM COATED ORAL NIGHTLY
Qty: 40 TABLET | Refills: 1 | Status: SHIPPED | OUTPATIENT
Start: 2021-04-20 | End: 2021-10-11 | Stop reason: CLARIF

## 2021-05-06 ENCOUNTER — PATIENT MESSAGE (OUTPATIENT)
Dept: RESEARCH | Facility: HOSPITAL | Age: 46
End: 2021-05-06

## 2021-05-25 ENCOUNTER — OFFICE VISIT (OUTPATIENT)
Dept: URGENT CARE | Facility: CLINIC | Age: 46
End: 2021-05-25
Payer: COMMERCIAL

## 2021-05-25 DIAGNOSIS — M54.9 DORSALGIA, UNSPECIFIED: ICD-10-CM

## 2021-05-25 DIAGNOSIS — M51.26 HERNIATED INTERVERTEBRAL DISC OF LUMBAR SPINE: Primary | ICD-10-CM

## 2021-05-25 DIAGNOSIS — M54.9 BACK PAIN WITH HISTORY OF SPINAL SURGERY: ICD-10-CM

## 2021-05-25 DIAGNOSIS — M54.41 ACUTE LOW BACK PAIN WITH RIGHT-SIDED SCIATICA, UNSPECIFIED BACK PAIN LATERALITY: ICD-10-CM

## 2021-05-25 DIAGNOSIS — S33.5XXD LUMBAR SPRAIN, SUBSEQUENT ENCOUNTER: ICD-10-CM

## 2021-05-25 DIAGNOSIS — Z98.890 BACK PAIN WITH HISTORY OF SPINAL SURGERY: ICD-10-CM

## 2021-05-25 PROCEDURE — 99214 OFFICE O/P EST MOD 30 MIN: CPT | Mod: S$GLB,,, | Performed by: PREVENTIVE MEDICINE

## 2021-05-25 PROCEDURE — 99214 PR OFFICE/OUTPT VISIT, EST, LEVL IV, 30-39 MIN: ICD-10-PCS | Mod: S$GLB,,, | Performed by: PREVENTIVE MEDICINE

## 2021-05-27 ENCOUNTER — TELEPHONE (OUTPATIENT)
Dept: NEUROSURGERY | Facility: CLINIC | Age: 46
End: 2021-05-27

## 2021-05-31 ENCOUNTER — PROCEDURE VISIT (OUTPATIENT)
Dept: NEUROLOGY | Facility: CLINIC | Age: 46
End: 2021-05-31
Payer: COMMERCIAL

## 2021-05-31 DIAGNOSIS — M54.9 BACK PAIN WITH HISTORY OF SPINAL SURGERY: ICD-10-CM

## 2021-05-31 DIAGNOSIS — Z98.890 BACK PAIN WITH HISTORY OF SPINAL SURGERY: ICD-10-CM

## 2021-05-31 DIAGNOSIS — S33.5XXD LUMBAR SPRAIN, SUBSEQUENT ENCOUNTER: ICD-10-CM

## 2021-05-31 DIAGNOSIS — M54.41 ACUTE LOW BACK PAIN WITH RIGHT-SIDED SCIATICA, UNSPECIFIED BACK PAIN LATERALITY: ICD-10-CM

## 2021-05-31 DIAGNOSIS — M51.26 HERNIATED INTERVERTEBRAL DISC OF LUMBAR SPINE: ICD-10-CM

## 2021-05-31 PROCEDURE — 95886 MUSC TEST DONE W/N TEST COMP: CPT | Mod: S$GLB,,, | Performed by: PSYCHIATRY & NEUROLOGY

## 2021-05-31 PROCEDURE — 95886 PR EMG COMPLETE, W/ NERVE CONDUCTION STUDIES, 5+ MUSCLES: ICD-10-PCS | Mod: S$GLB,,, | Performed by: PSYCHIATRY & NEUROLOGY

## 2021-05-31 PROCEDURE — 95910 NRV CNDJ TEST 7-8 STUDIES: CPT | Mod: S$GLB,,, | Performed by: PSYCHIATRY & NEUROLOGY

## 2021-05-31 PROCEDURE — 95910 PR NERVE CONDUCTION STUDY; 7-8 STUDIES: ICD-10-PCS | Mod: S$GLB,,, | Performed by: PSYCHIATRY & NEUROLOGY

## 2021-06-08 ENCOUNTER — OFFICE VISIT (OUTPATIENT)
Dept: NEUROSURGERY | Facility: CLINIC | Age: 46
End: 2021-06-08
Payer: COMMERCIAL

## 2021-06-08 DIAGNOSIS — M54.9 DORSALGIA, UNSPECIFIED: ICD-10-CM

## 2021-06-08 DIAGNOSIS — M51.9 LUMBAR DISC DISEASE: Primary | ICD-10-CM

## 2021-06-08 DIAGNOSIS — M54.16 LUMBAR RADICULOPATHY: ICD-10-CM

## 2021-06-08 PROCEDURE — 99211 OFF/OP EST MAY X REQ PHY/QHP: CPT | Mod: PBBFAC | Performed by: NEUROLOGICAL SURGERY

## 2021-06-08 PROCEDURE — 99204 OFFICE O/P NEW MOD 45 MIN: CPT | Mod: S$GLB,,, | Performed by: NEUROLOGICAL SURGERY

## 2021-06-08 PROCEDURE — 99204 PR OFFICE/OUTPT VISIT, NEW, LEVL IV, 45-59 MIN: ICD-10-PCS | Mod: S$GLB,,, | Performed by: NEUROLOGICAL SURGERY

## 2021-06-08 PROCEDURE — 99999 PR PBB SHADOW E&M-EST. PATIENT-LVL I: CPT | Mod: PBBFAC,,, | Performed by: NEUROLOGICAL SURGERY

## 2021-06-08 PROCEDURE — 99999 PR PBB SHADOW E&M-EST. PATIENT-LVL I: ICD-10-PCS | Mod: PBBFAC,,, | Performed by: NEUROLOGICAL SURGERY

## 2021-06-09 ENCOUNTER — TELEPHONE (OUTPATIENT)
Dept: NEUROSURGERY | Facility: CLINIC | Age: 46
End: 2021-06-09

## 2021-06-15 ENCOUNTER — OFFICE VISIT (OUTPATIENT)
Dept: URGENT CARE | Facility: CLINIC | Age: 46
End: 2021-06-15
Payer: COMMERCIAL

## 2021-06-15 ENCOUNTER — TELEPHONE (OUTPATIENT)
Dept: NEUROSURGERY | Facility: CLINIC | Age: 46
End: 2021-06-15

## 2021-06-15 DIAGNOSIS — M51.26 HERNIATED INTERVERTEBRAL DISC OF LUMBAR SPINE: Primary | ICD-10-CM

## 2021-06-15 DIAGNOSIS — S33.5XXD LUMBAR SPRAIN, SUBSEQUENT ENCOUNTER: ICD-10-CM

## 2021-06-15 DIAGNOSIS — M54.9 DORSALGIA, UNSPECIFIED: ICD-10-CM

## 2021-06-15 DIAGNOSIS — M54.41 ACUTE LOW BACK PAIN WITH RIGHT-SIDED SCIATICA, UNSPECIFIED BACK PAIN LATERALITY: ICD-10-CM

## 2021-06-15 DIAGNOSIS — Z98.890 BACK PAIN WITH HISTORY OF SPINAL SURGERY: ICD-10-CM

## 2021-06-15 DIAGNOSIS — M54.9 BACK PAIN WITH HISTORY OF SPINAL SURGERY: ICD-10-CM

## 2021-06-15 PROCEDURE — 99214 OFFICE O/P EST MOD 30 MIN: CPT | Mod: S$GLB,,, | Performed by: PREVENTIVE MEDICINE

## 2021-06-15 PROCEDURE — 99214 PR OFFICE/OUTPT VISIT, EST, LEVL IV, 30-39 MIN: ICD-10-PCS | Mod: S$GLB,,, | Performed by: PREVENTIVE MEDICINE

## 2021-06-16 ENCOUNTER — TELEPHONE (OUTPATIENT)
Dept: NEUROSURGERY | Facility: CLINIC | Age: 46
End: 2021-06-16

## 2021-06-17 ENCOUNTER — TELEPHONE (OUTPATIENT)
Dept: NEUROSURGERY | Facility: CLINIC | Age: 46
End: 2021-06-17

## 2021-06-22 RX ORDER — GABAPENTIN 300 MG/1
300 CAPSULE ORAL 3 TIMES DAILY
Qty: 90 CAPSULE | Refills: 0 | Status: SHIPPED | OUTPATIENT
Start: 2021-06-22 | End: 2021-10-11 | Stop reason: CLARIF

## 2021-06-22 RX ORDER — METHOCARBAMOL 750 MG/1
750 TABLET, FILM COATED ORAL
Qty: 90 TABLET | Refills: 2 | Status: SHIPPED | OUTPATIENT
Start: 2021-06-22 | End: 2021-07-02

## 2021-06-22 RX ORDER — NAPROXEN 500 MG/1
500 TABLET ORAL 2 TIMES DAILY WITH MEALS
Qty: 90 TABLET | Refills: 2 | Status: SHIPPED | OUTPATIENT
Start: 2021-06-22 | End: 2021-07-22

## 2021-06-23 ENCOUNTER — TELEPHONE (OUTPATIENT)
Dept: NEUROSURGERY | Facility: CLINIC | Age: 46
End: 2021-06-23

## 2021-06-23 ENCOUNTER — TELEPHONE (OUTPATIENT)
Dept: PAIN MEDICINE | Facility: CLINIC | Age: 46
End: 2021-06-23

## 2021-06-23 DIAGNOSIS — M54.9 DORSALGIA, UNSPECIFIED: ICD-10-CM

## 2021-06-23 DIAGNOSIS — M54.16 LUMBAR RADICULOPATHY: Primary | ICD-10-CM

## 2021-06-23 DIAGNOSIS — M51.9 LUMBAR DISC DISEASE: ICD-10-CM

## 2021-06-24 ENCOUNTER — TELEPHONE (OUTPATIENT)
Dept: PAIN MEDICINE | Facility: OTHER | Age: 46
End: 2021-06-24

## 2021-06-24 ENCOUNTER — PATIENT MESSAGE (OUTPATIENT)
Dept: PAIN MEDICINE | Facility: OTHER | Age: 46
End: 2021-06-24

## 2021-06-24 DIAGNOSIS — M51.9 LUMBAR DISC DISEASE: Primary | ICD-10-CM

## 2021-07-08 ENCOUNTER — TELEPHONE (OUTPATIENT)
Dept: PAIN MEDICINE | Facility: CLINIC | Age: 46
End: 2021-07-08

## 2021-07-09 ENCOUNTER — PATIENT MESSAGE (OUTPATIENT)
Dept: PAIN MEDICINE | Facility: OTHER | Age: 46
End: 2021-07-09

## 2021-07-12 ENCOUNTER — HOSPITAL ENCOUNTER (OUTPATIENT)
Facility: OTHER | Age: 46
Discharge: HOME | End: 2021-07-12
Attending: ANESTHESIOLOGY | Admitting: ANESTHESIOLOGY
Payer: COMMERCIAL

## 2021-07-12 ENCOUNTER — TELEPHONE (OUTPATIENT)
Dept: PAIN MEDICINE | Facility: CLINIC | Age: 46
End: 2021-07-12

## 2021-07-12 VITALS
WEIGHT: 205 LBS | OXYGEN SATURATION: 97 % | TEMPERATURE: 99 F | SYSTOLIC BLOOD PRESSURE: 146 MMHG | HEART RATE: 84 BPM | RESPIRATION RATE: 15 BRPM | DIASTOLIC BLOOD PRESSURE: 86 MMHG | BODY MASS INDEX: 29.35 KG/M2 | HEIGHT: 70 IN

## 2021-07-12 DIAGNOSIS — M51.36 DDD (DEGENERATIVE DISC DISEASE), LUMBAR: ICD-10-CM

## 2021-07-12 DIAGNOSIS — M51.37 DDD (DEGENERATIVE DISC DISEASE), LUMBOSACRAL: Primary | ICD-10-CM

## 2021-07-12 DIAGNOSIS — M54.17 LUMBOSACRAL RADICULOPATHY: ICD-10-CM

## 2021-07-12 PROBLEM — M51.369 DDD (DEGENERATIVE DISC DISEASE), LUMBAR: Status: ACTIVE | Noted: 2021-07-12

## 2021-07-12 PROCEDURE — 64483 NJX AA&/STRD TFRM EPI L/S 1: CPT | Mod: RT,,, | Performed by: ANESTHESIOLOGY

## 2021-07-12 PROCEDURE — 25000003 PHARM REV CODE 250: Performed by: ANESTHESIOLOGY

## 2021-07-12 PROCEDURE — 63600175 PHARM REV CODE 636 W HCPCS: Performed by: ANESTHESIOLOGY

## 2021-07-12 PROCEDURE — 25500020 PHARM REV CODE 255: Performed by: ANESTHESIOLOGY

## 2021-07-12 PROCEDURE — 64483 NJX AA&/STRD TFRM EPI L/S 1: CPT | Mod: RT | Performed by: ANESTHESIOLOGY

## 2021-07-12 PROCEDURE — 64483 PR EPIDURAL INJ, ANES/STEROID, TRANSFORAMINAL, LUMB/SACR, SNGL LEVL: ICD-10-PCS | Mod: RT,,, | Performed by: ANESTHESIOLOGY

## 2021-07-12 RX ORDER — ALPRAZOLAM 0.5 MG/1
1 TABLET ORAL ONCE
Status: COMPLETED | OUTPATIENT
Start: 2021-07-12 | End: 2021-07-12

## 2021-07-12 RX ORDER — DEXAMETHASONE SODIUM PHOSPHATE 100 MG/10ML
INJECTION INTRAMUSCULAR; INTRAVENOUS
Status: DISCONTINUED | OUTPATIENT
Start: 2021-07-12 | End: 2021-07-12 | Stop reason: HOSPADM

## 2021-07-12 RX ORDER — LIDOCAINE HYDROCHLORIDE 10 MG/ML
INJECTION INFILTRATION; PERINEURAL
Status: DISCONTINUED | OUTPATIENT
Start: 2021-07-12 | End: 2021-07-12 | Stop reason: HOSPADM

## 2021-07-12 RX ORDER — SODIUM CHLORIDE 9 MG/ML
INJECTION, SOLUTION INTRAVENOUS CONTINUOUS
Status: DISCONTINUED | OUTPATIENT
Start: 2021-07-12 | End: 2021-07-12 | Stop reason: HOSPADM

## 2021-07-12 RX ORDER — LIDOCAINE HYDROCHLORIDE 10 MG/ML
INJECTION, SOLUTION EPIDURAL; INFILTRATION; INTRACAUDAL; PERINEURAL
Status: DISCONTINUED | OUTPATIENT
Start: 2021-07-12 | End: 2021-07-12 | Stop reason: HOSPADM

## 2021-07-12 RX ADMIN — ALPRAZOLAM 1 MG: 0.5 TABLET ORAL at 01:07

## 2021-07-13 DIAGNOSIS — M51.37 DDD (DEGENERATIVE DISC DISEASE), LUMBOSACRAL: Primary | ICD-10-CM

## 2021-07-13 DIAGNOSIS — M54.17 LUMBOSACRAL RADICULOPATHY: ICD-10-CM

## 2021-07-13 DIAGNOSIS — M51.9 LUMBAR DISC DISEASE: ICD-10-CM

## 2021-07-13 RX ORDER — ACETAMINOPHEN 500 MG
500 TABLET ORAL 2 TIMES DAILY
Qty: 60 TABLET | Refills: 0 | Status: SHIPPED | OUTPATIENT
Start: 2021-07-13 | End: 2023-05-02

## 2021-08-17 ENCOUNTER — OFFICE VISIT (OUTPATIENT)
Dept: NEUROSURGERY | Facility: CLINIC | Age: 46
End: 2021-08-17
Payer: COMMERCIAL

## 2021-08-17 VITALS
HEIGHT: 70 IN | BODY MASS INDEX: 29.35 KG/M2 | HEART RATE: 101 BPM | DIASTOLIC BLOOD PRESSURE: 101 MMHG | TEMPERATURE: 98 F | WEIGHT: 205 LBS | SYSTOLIC BLOOD PRESSURE: 142 MMHG

## 2021-08-17 DIAGNOSIS — M54.16 LUMBAR RADICULOPATHY: Primary | ICD-10-CM

## 2021-08-17 DIAGNOSIS — M51.9 LUMBAR DISC DISEASE: ICD-10-CM

## 2021-08-17 PROCEDURE — 99999 PR PBB SHADOW E&M-EST. PATIENT-LVL III: ICD-10-PCS | Mod: PBBFAC,,, | Performed by: PHYSICIAN ASSISTANT

## 2021-08-17 PROCEDURE — 99213 OFFICE O/P EST LOW 20 MIN: CPT | Mod: PBBFAC | Performed by: PHYSICIAN ASSISTANT

## 2021-08-17 PROCEDURE — 99213 OFFICE O/P EST LOW 20 MIN: CPT | Mod: S$PBB,,, | Performed by: PHYSICIAN ASSISTANT

## 2021-08-17 PROCEDURE — 99213 PR OFFICE/OUTPT VISIT, EST, LEVL III, 20-29 MIN: ICD-10-PCS | Mod: S$PBB,,, | Performed by: PHYSICIAN ASSISTANT

## 2021-08-17 PROCEDURE — 99999 PR PBB SHADOW E&M-EST. PATIENT-LVL III: CPT | Mod: PBBFAC,,, | Performed by: PHYSICIAN ASSISTANT

## 2021-09-03 ENCOUNTER — PATIENT MESSAGE (OUTPATIENT)
Dept: NEUROSURGERY | Facility: CLINIC | Age: 46
End: 2021-09-03

## 2021-09-07 ENCOUNTER — TELEPHONE (OUTPATIENT)
Dept: NEUROSURGERY | Facility: CLINIC | Age: 46
End: 2021-09-07

## 2021-09-10 ENCOUNTER — TELEPHONE (OUTPATIENT)
Dept: NEUROSURGERY | Facility: CLINIC | Age: 46
End: 2021-09-10

## 2021-09-10 NOTE — TELEPHONE ENCOUNTER
----- Message from Castro Rain sent at 9/10/2021 11:24 AM CDT -----  Contact: @ [phone]  Patient returning a missed call from augustus Zeng return call

## 2021-09-14 ENCOUNTER — OFFICE VISIT (OUTPATIENT)
Dept: NEUROSURGERY | Facility: CLINIC | Age: 46
End: 2021-09-14
Payer: COMMERCIAL

## 2021-09-14 DIAGNOSIS — M54.16 LUMBAR RADICULOPATHY: Primary | ICD-10-CM

## 2021-09-14 PROCEDURE — 99214 PR OFFICE/OUTPT VISIT, EST, LEVL IV, 30-39 MIN: ICD-10-PCS | Mod: S$GLB,,, | Performed by: NEUROLOGICAL SURGERY

## 2021-09-14 PROCEDURE — 99214 OFFICE O/P EST MOD 30 MIN: CPT | Mod: S$GLB,,, | Performed by: NEUROLOGICAL SURGERY

## 2021-09-14 PROCEDURE — 99999 PR PBB SHADOW E&M-EST. PATIENT-LVL I: CPT | Mod: PBBFAC,,, | Performed by: NEUROLOGICAL SURGERY

## 2021-09-14 PROCEDURE — 99999 PR PBB SHADOW E&M-EST. PATIENT-LVL I: ICD-10-PCS | Mod: PBBFAC,,, | Performed by: NEUROLOGICAL SURGERY

## 2021-09-30 ENCOUNTER — PATIENT MESSAGE (OUTPATIENT)
Dept: NEUROSURGERY | Facility: CLINIC | Age: 46
End: 2021-09-30

## 2021-09-30 DIAGNOSIS — Z01.818 PRE-OP TESTING: ICD-10-CM

## 2021-09-30 DIAGNOSIS — M51.26 HNP (HERNIATED NUCLEUS PULPOSUS), LUMBAR: ICD-10-CM

## 2021-09-30 DIAGNOSIS — M54.10 RADICULOPATHY, UNSPECIFIED SPINAL REGION: ICD-10-CM

## 2021-09-30 DIAGNOSIS — M47.817 SPONDYLOSIS OF LUMBOSACRAL REGION, UNSPECIFIED SPINAL OSTEOARTHRITIS COMPLICATION STATUS: ICD-10-CM

## 2021-09-30 DIAGNOSIS — M48.07 SPINAL STENOSIS, LUMBOSACRAL REGION: Primary | ICD-10-CM

## 2021-10-11 ENCOUNTER — TELEPHONE (OUTPATIENT)
Dept: PREADMISSION TESTING | Facility: HOSPITAL | Age: 46
End: 2021-10-11

## 2021-10-11 DIAGNOSIS — M51.26 HNP (HERNIATED NUCLEUS PULPOSUS), LUMBAR: Primary | ICD-10-CM

## 2021-10-11 DIAGNOSIS — M51.36 DDD (DEGENERATIVE DISC DISEASE), LUMBAR: ICD-10-CM

## 2021-10-11 DIAGNOSIS — Z01.818 PREOPERATIVE TESTING: Primary | ICD-10-CM

## 2021-10-21 ENCOUNTER — TELEPHONE (OUTPATIENT)
Dept: NEUROSURGERY | Facility: CLINIC | Age: 46
End: 2021-10-21

## 2021-10-21 NOTE — TELEPHONE ENCOUNTER
----- Message from Jenny Crespo sent at 10/21/2021 12:01 PM CDT -----  Contact: self @ [phone]  Pt is currently scheduled for surgery on 11-4-21 but workman comp has denied the surgery until he see's their Dr.  Pt is calling to see if he still needs to come in to all of his appts on 10-29-21.  Pls call.

## 2021-11-23 ENCOUNTER — TELEPHONE (OUTPATIENT)
Dept: NEUROSURGERY | Facility: CLINIC | Age: 46
End: 2021-11-23

## 2021-11-23 NOTE — TELEPHONE ENCOUNTER
----- Message from Tania Henderson sent at 11/23/2021 12:33 PM CST -----  Regarding: Patient Advice  Contact: Pt [phone]  Patient is calling Dr. Rhodes's office in regards to knowing the status of reschedule of procedure. Please call. [phone]

## 2021-12-13 ENCOUNTER — TELEPHONE (OUTPATIENT)
Dept: NEUROSURGERY | Facility: CLINIC | Age: 46
End: 2021-12-13
Payer: COMMERCIAL

## 2021-12-14 ENCOUNTER — OFFICE VISIT (OUTPATIENT)
Dept: NEUROSURGERY | Facility: CLINIC | Age: 46
End: 2021-12-14

## 2021-12-14 VITALS
WEIGHT: 205 LBS | SYSTOLIC BLOOD PRESSURE: 151 MMHG | DIASTOLIC BLOOD PRESSURE: 110 MMHG | TEMPERATURE: 98 F | HEART RATE: 78 BPM | HEIGHT: 70 IN | BODY MASS INDEX: 29.35 KG/M2

## 2021-12-14 DIAGNOSIS — M25.569 KNEE PAIN, UNSPECIFIED CHRONICITY, UNSPECIFIED LATERALITY: ICD-10-CM

## 2021-12-14 DIAGNOSIS — M51.36 DDD (DEGENERATIVE DISC DISEASE), LUMBAR: Primary | ICD-10-CM

## 2021-12-14 DIAGNOSIS — M79.89 SWELLING OF LOWER EXTREMITY: ICD-10-CM

## 2021-12-14 PROCEDURE — 99999 PR PBB SHADOW E&M-EST. PATIENT-LVL III: CPT | Mod: PBBFAC,,, | Performed by: PHYSICIAN ASSISTANT

## 2021-12-14 PROCEDURE — 99999 PR PBB SHADOW E&M-EST. PATIENT-LVL III: ICD-10-PCS | Mod: PBBFAC,,, | Performed by: PHYSICIAN ASSISTANT

## 2021-12-14 PROCEDURE — 99214 PR OFFICE/OUTPT VISIT, EST, LEVL IV, 30-39 MIN: ICD-10-PCS | Mod: S$PBB,,, | Performed by: PHYSICIAN ASSISTANT

## 2021-12-14 PROCEDURE — 99214 OFFICE O/P EST MOD 30 MIN: CPT | Mod: S$PBB,,, | Performed by: PHYSICIAN ASSISTANT

## 2021-12-14 PROCEDURE — 99213 OFFICE O/P EST LOW 20 MIN: CPT | Mod: PBBFAC | Performed by: PHYSICIAN ASSISTANT

## 2022-01-11 ENCOUNTER — TELEPHONE (OUTPATIENT)
Dept: NEUROSURGERY | Facility: CLINIC | Age: 47
End: 2022-01-11

## 2022-01-19 ENCOUNTER — TELEPHONE (OUTPATIENT)
Dept: NEUROSURGERY | Facility: CLINIC | Age: 47
End: 2022-01-19
Payer: COMMERCIAL

## 2022-01-19 NOTE — TELEPHONE ENCOUNTER
Called and left message with his family member that I am waiting to hear from our workans' comp department to get auth notice so I can schedule

## 2022-01-19 NOTE — TELEPHONE ENCOUNTER
----- Message from Zaida Hummel sent at 1/19/2022  7:58 AM CST -----  Regarding: Test Inquiry  Regarding:Pt called receiving a call from workers [facility] stating his test was approved and would like to know when it will be scheduled.        Requesting Call back number:[phone]

## 2022-01-20 ENCOUNTER — TELEPHONE (OUTPATIENT)
Dept: NEUROSURGERY | Facility: CLINIC | Age: 47
End: 2022-01-20
Payer: COMMERCIAL

## 2022-01-20 DIAGNOSIS — M51.36 DDD (DEGENERATIVE DISC DISEASE), LUMBAR: Primary | ICD-10-CM

## 2022-01-20 NOTE — TELEPHONE ENCOUNTER
----- Message from Nia Bean sent at 2022  7:59 AM CST -----  Good morning,  The Discogram was approved on 2021. In basket messages were sent on 2021 & 2022. Please see below.    Thanks,  Nia  EXT [id number]          In basket message sent to Catalina Wolff office:    Good afternoon,  Please see the email below from the patients workers comp nurse  regarding the approved [place] DISCOGRAM LUMBAR which was approved on 2021.    Thanks,  Nia  EXT [id number]      From: Ariadna Grady <[email or web address]>   Sent: 2022 10:08 AM  To: Nia Bean [email or web address]  Subject: [EXTERNAL] RE: Junior Garay ([place]) [employer] ([id number]) CL# : [id number]      Nia,    RE: Juniordani Garay : 1975    Can you check to see if the approved discogram has been completed?  If yes, can you send me a copy of the report. If no, can you update me on when this is scheduled.    Also, when is Mr. Garay to be seen again by Dr. Rhodes?    Thank you,        ASUNCION LinaresN, RN-BC, [facility]  Medical Case Manager  [place], [place]  CELL: [phone]  Fax:  [phone]  EMAIL [email or web address]     ----- Message -----  From: Enid Herrera RN  Sent: 2022   4:57 PM CST  To: Nia Bean    Discogram  ----- Message -----  From: Nia Bean  Sent: 2022   4:53 PM CST  To: Enid Herrera RN    Good afternoon,  Which test / referral are you referring to? There were several referrals submitted to the workers comp carrier for authorization.    Thanks,  Nia  EXT [phone]    ----- Message -----  From: Enid Herrera RN  Sent: 2022   1:40 PM CST  To: Nia Bean    Did we get an approval for a test Catalina ordered? The patient says a test was approved

## 2022-01-20 NOTE — TELEPHONE ENCOUNTER
Sent another message to IR to get the discogram scheduled. Notified the patient that he should be receiving  Call from them.

## 2022-01-31 ENCOUNTER — LAB VISIT (OUTPATIENT)
Dept: LAB | Facility: HOSPITAL | Age: 47
End: 2022-01-31

## 2022-01-31 ENCOUNTER — CLINICAL SUPPORT (OUTPATIENT)
Dept: NEUROSURGERY | Facility: CLINIC | Age: 47
End: 2022-01-31

## 2022-01-31 VITALS
SYSTOLIC BLOOD PRESSURE: 142 MMHG | HEART RATE: 82 BPM | DIASTOLIC BLOOD PRESSURE: 95 MMHG | HEIGHT: 70 IN | WEIGHT: 218.69 LBS | BODY MASS INDEX: 31.31 KG/M2

## 2022-01-31 DIAGNOSIS — M51.36 DDD (DEGENERATIVE DISC DISEASE), LUMBAR: ICD-10-CM

## 2022-01-31 DIAGNOSIS — M51.36 DDD (DEGENERATIVE DISC DISEASE), LUMBAR: Primary | ICD-10-CM

## 2022-01-31 LAB
BASOPHILS # BLD AUTO: 0.03 K/UL (ref 0–0.2)
BASOPHILS NFR BLD: 0.8 % (ref 0–1.9)
DIFFERENTIAL METHOD: ABNORMAL
EOSINOPHIL # BLD AUTO: 0.2 K/UL (ref 0–0.5)
EOSINOPHIL NFR BLD: 5.9 % (ref 0–8)
ERYTHROCYTE [DISTWIDTH] IN BLOOD BY AUTOMATED COUNT: 12.8 % (ref 11.5–14.5)
HCT VFR BLD AUTO: 46.2 % (ref 40–54)
HGB BLD-MCNC: 15.3 G/DL (ref 14–18)
IMM GRANULOCYTES # BLD AUTO: 0 K/UL (ref 0–0.04)
IMM GRANULOCYTES NFR BLD AUTO: 0 % (ref 0–0.5)
INR PPP: 0.9 (ref 0.8–1.2)
LYMPHOCYTES # BLD AUTO: 1.2 K/UL (ref 1–4.8)
LYMPHOCYTES NFR BLD: 33.3 % (ref 18–48)
MCH RBC QN AUTO: 30.7 PG (ref 27–31)
MCHC RBC AUTO-ENTMCNC: 33.1 G/DL (ref 32–36)
MCV RBC AUTO: 93 FL (ref 82–98)
MONOCYTES # BLD AUTO: 0.6 K/UL (ref 0.3–1)
MONOCYTES NFR BLD: 15.8 % (ref 4–15)
NEUTROPHILS # BLD AUTO: 1.6 K/UL (ref 1.8–7.7)
NEUTROPHILS NFR BLD: 44.2 % (ref 38–73)
NRBC BLD-RTO: 0 /100 WBC
PLATELET # BLD AUTO: 343 K/UL (ref 150–450)
PMV BLD AUTO: 9.5 FL (ref 9.2–12.9)
PROTHROMBIN TIME: 10.2 SEC (ref 9–12.5)
RBC # BLD AUTO: 4.98 M/UL (ref 4.6–6.2)
WBC # BLD AUTO: 3.54 K/UL (ref 3.9–12.7)

## 2022-01-31 PROCEDURE — 99999 PR PBB SHADOW E&M-EST. PATIENT-LVL III: ICD-10-PCS | Mod: PBBFAC,,,

## 2022-01-31 PROCEDURE — 99204 OFFICE O/P NEW MOD 45 MIN: CPT | Mod: S$PBB,,, | Performed by: RADIOLOGY

## 2022-01-31 PROCEDURE — 99204 PR OFFICE/OUTPT VISIT, NEW, LEVL IV, 45-59 MIN: ICD-10-PCS | Mod: S$PBB,,, | Performed by: RADIOLOGY

## 2022-01-31 PROCEDURE — 36415 COLL VENOUS BLD VENIPUNCTURE: CPT | Performed by: PHYSICIAN ASSISTANT

## 2022-01-31 PROCEDURE — 85610 PROTHROMBIN TIME: CPT | Performed by: PHYSICIAN ASSISTANT

## 2022-01-31 PROCEDURE — 85025 COMPLETE CBC W/AUTO DIFF WBC: CPT | Performed by: PHYSICIAN ASSISTANT

## 2022-01-31 PROCEDURE — 99213 OFFICE O/P EST LOW 20 MIN: CPT | Mod: PBBFAC

## 2022-01-31 PROCEDURE — 99999 PR PBB SHADOW E&M-EST. PATIENT-LVL III: CPT | Mod: PBBFAC,,,

## 2022-01-31 NOTE — PROGRESS NOTES
"Subjective:       Patient ID: Junior Garay is a 46 y.o. male.    Chief Complaint: Back Pain    47 y/o seen at the request of Catalina Wolff PA-C for discussion of discogram.     Per YONAS Wolff's most recent clinic visit note (12/15/2021):   "History of Present Illness:  Patient see me for follow-up after a evaluation of the patient on 08/17/2021.  This is a 46-year-old male who is a worker's comp patient who we have been following for some time now.  Patient had a history of of hurting his back lifting a heart worry here in 2020. Last time we saw me had pretty significant intractable back pain and radiculopathy but had not had a full course of conservative management.  He has since undergone physical therapy, taken gabapentin, Medrol Dosepak in a cardiac pain medication as well as epidural and transforaminal injections.  Unfortunately nothing has really helped.  His pain continues to get worse and now impairing his daily living and impacting his ability to return to work.  His EMG does show active denervation of L5 and S1.  He denies any bowel bladder issues.       Interval History:  Pt returns to neurosurgery clinic today for reevaluation. He was scheduled for lumbar TLIF after his last visit but this was denied. He was referred for a second opinion through worker's comp as they did not feel he qualified for surgery at that time. The physician recommended that he obtain a CT myelogram and discogram for further evaluation of his pain. Today, he reports he has been experiencing some right knee pain. The pain radiates from his hip to his knee and he does have guarding and pain with palpation to his right knee. He currently walks with a walker. He also describes mild swelling to the right ankle. He denies any new left leg symptoms or change in his back pain. "    Review of Systems      Objective:      Physical Exam  Constitutional:       Appearance: Normal appearance.   HENT:      Head: Normocephalic and " atraumatic.   Eyes:      Extraocular Movements: Extraocular movements intact.   Pulmonary:      Effort: Pulmonary effort is normal.   Abdominal:      General: There is no distension.   Musculoskeletal:      Comments: Using cane for ambulation  PLS see Catalina Wolff PA-C note from 12/15/2021 for full ortho/spine exam   Neurological:      Mental Status: He is alert.   Psychiatric:         Mood and Affect: Mood normal.         Behavior: Behavior normal.         Thought Content: Thought content normal.         Judgment: Judgment normal.         IMAGING     MRI lumbar spine from March 2021 reviewed.  Representative images pasted below.       Assessment:       Problem List Items Addressed This Visit     DDD (degenerative disc disease), lumbar - Primary    Relevant Orders    CBC Auto Differential    Protime-INR          Plan:       Patient seen and examined with Ted Botello MD.      Discussed how the discogram procedure will be performed, risks (including, but not limited to, pain, bleeding, infection, damage to nearby structures, and the need for additional procedures), benefits, possible complications, pre-post procedure expectations, and alternatives. The patient voices understanding and all questions have been answered.  The patient agrees to proceed as planned. Dr. Botello in room. Written informed consent obtained. Patient scheduled for 2/22. Pre-procedure handout with clinic phone number provided.    Levels to be tested L2/3, L3/4, L4/5, L5/S1      Discussed the nature of this procedure is diagnostic rather than therapeutic.  Discussed any increase in pain should be limited to the immediate post procedure period.     Thank you for allowing Interventional Radiology to participate in this patient's care.     Kiki Antoine PA-C  Interventional Radiology  Clinic [phone]

## 2022-02-14 RX ORDER — FENTANYL CITRATE 50 UG/ML
50 INJECTION, SOLUTION INTRAMUSCULAR; INTRAVENOUS
Status: CANCELLED | OUTPATIENT
Start: 2022-02-14

## 2022-02-14 RX ORDER — MIDAZOLAM HYDROCHLORIDE 1 MG/ML
1 INJECTION INTRAMUSCULAR; INTRAVENOUS
Status: CANCELLED | OUTPATIENT
Start: 2022-02-14

## 2022-02-22 ENCOUNTER — HOSPITAL ENCOUNTER (OUTPATIENT)
Dept: INTERVENTIONAL RADIOLOGY/VASCULAR | Facility: HOSPITAL | Age: 47
Discharge: HOME | End: 2022-02-22
Attending: PHYSICIAN ASSISTANT
Payer: COMMERCIAL

## 2022-02-22 VITALS
RESPIRATION RATE: 15 BRPM | OXYGEN SATURATION: 97 % | BODY MASS INDEX: 29.35 KG/M2 | HEART RATE: 71 BPM | SYSTOLIC BLOOD PRESSURE: 145 MMHG | TEMPERATURE: 98 F | HEIGHT: 70 IN | DIASTOLIC BLOOD PRESSURE: 86 MMHG | WEIGHT: 205 LBS

## 2022-02-22 DIAGNOSIS — M51.36 DDD (DEGENERATIVE DISC DISEASE), LUMBAR: ICD-10-CM

## 2022-02-22 PROCEDURE — 62290 NJX PX DISCOGRAPHY LUMBAR: CPT | Mod: 59,,, | Performed by: RADIOLOGY

## 2022-02-22 PROCEDURE — 62290 IR DISCOGRAM LUMBAR: ICD-10-PCS | Mod: 59,,, | Performed by: RADIOLOGY

## 2022-02-22 PROCEDURE — 72295 X-RAY OF LOWER SPINE DISK: CPT | Mod: 26,59,, | Performed by: RADIOLOGY

## 2022-02-22 PROCEDURE — 25500020 PHARM REV CODE 255: Performed by: RADIOLOGY

## 2022-02-22 PROCEDURE — 63600175 PHARM REV CODE 636 W HCPCS: Performed by: RADIOLOGY

## 2022-02-22 PROCEDURE — 62290 NJX PX DISCOGRAPHY LUMBAR: CPT | Mod: 59 | Performed by: RADIOLOGY

## 2022-02-22 PROCEDURE — A4550 SURGICAL TRAYS: HCPCS

## 2022-02-22 PROCEDURE — 72295 PR  DISCOGRAPHY LUMBAR SPINE: ICD-10-PCS | Mod: 26,59,, | Performed by: RADIOLOGY

## 2022-02-22 PROCEDURE — 72295 X-RAY OF LOWER SPINE DISK: CPT | Mod: TC,59 | Performed by: RADIOLOGY

## 2022-02-22 PROCEDURE — 63600175 PHARM REV CODE 636 W HCPCS: Performed by: STUDENT IN AN ORGANIZED HEALTH CARE EDUCATION/TRAINING PROGRAM

## 2022-02-22 RX ORDER — CEFAZOLIN SODIUM 1 G/50ML
SOLUTION INTRAVENOUS
Status: DISCONTINUED | OUTPATIENT
Start: 2022-02-22 | End: 2022-02-23 | Stop reason: HOSPADM

## 2022-02-22 RX ORDER — HYDROMORPHONE HYDROCHLORIDE 1 MG/ML
INJECTION, SOLUTION INTRAMUSCULAR; INTRAVENOUS; SUBCUTANEOUS
Status: DISCONTINUED
Start: 2022-02-22 | End: 2022-02-23 | Stop reason: HOSPADM

## 2022-02-22 RX ORDER — MIDAZOLAM HYDROCHLORIDE 1 MG/ML
INJECTION INTRAMUSCULAR; INTRAVENOUS CODE/TRAUMA/SEDATION MEDICATION
Status: DISCONTINUED | OUTPATIENT
Start: 2022-02-22 | End: 2022-02-23 | Stop reason: HOSPADM

## 2022-02-22 RX ORDER — CEFAZOLIN SODIUM 1 G/50ML
1 SOLUTION INTRAVENOUS
Status: ACTIVE | OUTPATIENT
Start: 2022-02-22 | End: 2022-02-22

## 2022-02-22 RX ORDER — SODIUM CHLORIDE 9 MG/ML
INJECTION, SOLUTION INTRAVENOUS CONTINUOUS
Status: DISCONTINUED | OUTPATIENT
Start: 2022-02-22 | End: 2022-02-23 | Stop reason: HOSPADM

## 2022-02-22 RX ORDER — HYDROMORPHONE HYDROCHLORIDE 1 MG/ML
0.5 INJECTION, SOLUTION INTRAMUSCULAR; INTRAVENOUS; SUBCUTANEOUS ONCE
Status: COMPLETED | OUTPATIENT
Start: 2022-02-22 | End: 2022-02-22

## 2022-02-22 RX ORDER — IODIXANOL 320 MG/ML
50 INJECTION, SOLUTION INTRAVASCULAR
Status: COMPLETED | OUTPATIENT
Start: 2022-02-22 | End: 2022-02-22

## 2022-02-22 RX ORDER — FENTANYL CITRATE 50 UG/ML
INJECTION, SOLUTION INTRAMUSCULAR; INTRAVENOUS CODE/TRAUMA/SEDATION MEDICATION
Status: DISCONTINUED | OUTPATIENT
Start: 2022-02-22 | End: 2022-02-23 | Stop reason: HOSPADM

## 2022-02-22 RX ADMIN — FENTANYL CITRATE 50 MCG: 50 INJECTION, SOLUTION INTRAMUSCULAR; INTRAVENOUS at 11:02

## 2022-02-22 RX ADMIN — FENTANYL CITRATE 25 MCG: 50 INJECTION, SOLUTION INTRAMUSCULAR; INTRAVENOUS at 11:02

## 2022-02-22 RX ADMIN — CEFAZOLIN SODIUM 1 G: 1 SOLUTION INTRAVENOUS at 10:02

## 2022-02-22 RX ADMIN — IODIXANOL 35 ML: 320 INJECTION, SOLUTION INTRAVASCULAR at 11:02

## 2022-02-22 RX ADMIN — MIDAZOLAM HYDROCHLORIDE 1 MG: 1 INJECTION INTRAMUSCULAR; INTRAVENOUS at 11:02

## 2022-02-22 RX ADMIN — HYDROMORPHONE HYDROCHLORIDE 0.5 MG: 1 INJECTION, SOLUTION INTRAMUSCULAR; INTRAVENOUS; SUBCUTANEOUS at 12:02

## 2022-02-22 RX ADMIN — MIDAZOLAM HYDROCHLORIDE 0.5 MG: 1 INJECTION INTRAMUSCULAR; INTRAVENOUS at 11:02

## 2022-02-22 NOTE — PLAN OF CARE
Received pt to floor from home accompanied by significant other.  AAO x 4. Denies pain or discomfort. Respirations even and unlabored. No distress noted. Pt stable.  Admit assessment complete. IV x 1 placed.  Pt oriented to room and call bell placed within reach.  Will continue to monitor.

## 2022-02-22 NOTE — PROCEDURES
Radiology Post-Procedure Note    Pre Op Diagnosis: discogenic back pain  Post Op Diagnosis: Same    Procedure: Lumbar discogram 4 level    Procedure performed by: Ted Botello MD    Written Informed Consent Obtained: Yes  Specimen Removed: NO  Estimated Blood Loss: Minimal  Omnipaque 180 9cc used. Lidocaine 1% 10cc for local anesthesia.  Moderate sedation by nurse using Fentanyl and Versed.    Findings:   Typical severe pain at L4-5 abd L5-S1 with annular tears.    Severe atypical pain at L3-4 with annular tear  Normal control discogram at L2-3.    Patient tolerated procedure well.    Ted Botello MD  Attending Radiologist  Interventional Neuroradiology

## 2022-02-22 NOTE — PLAN OF CARE
Patient brought to IR 4 for discogram. Patient AAOx3, no distress noted, respirations even and unlabored, will continue to monitor. Acceptance of education, consents signed, H/P done. Labs reviewed.

## 2022-02-22 NOTE — DISCHARGE INSTRUCTIONS
For scheduling: Call [phone]    For questions or concerns call: KATERIN MON-FRI 8 AM- 5PM [phone]. Radiology resident on call [phone].    For immediate concerns that are not emergent, you may call our radiology clinic at: [phone]

## 2022-02-22 NOTE — NURSING
Pt received post discogram, transported by RN via stretcher. Pt placed on continuous bedside cardiac, SpO2, and NIBP monitor. Stretcher low, locked, call bell in reach. Pt A&O, VSS, on RA. Site clean, dry, intact. Pt denying any pain or distress. Will continue to monitor.

## 2022-02-22 NOTE — H&P
Radiology History & Physical      SUBJECTIVE:     Chief Complaint: back pain    History of Present Illness:  Junior Garay is a 47 y.o. male with lower back pain presenting for lumbar discogram.   Past Medical History:   Diagnosis Date    Seizures      Past Surgical History:   Procedure Laterality Date    ABDOMINAL SURGERY      APPENDECTOMY      BACK SURGERY      COLON SURGERY      TRANSFORAMINAL EPIDURAL INJECTION OF STEROID Right 07/12/2021    Procedure: LUMBAR TRANSFORAMINAL RIGHT L4/5 DIRECT REFERRAL NEEDS CONSENT;  Surgeon: Ramya Lundy MD;  Location: [facility];  Service: Pain Management;  Laterality: Right;       Home Meds:   Prior to Admission medications    Medication Sig Start Date End Date Taking? Authorizing Provider   acetaminophen (TYLENOL) 500 MG tablet Take 1 tablet (500 mg total) by mouth 2 (two) times a day. 7/13/21   Ramya Lundy MD   naproxen (NAPROSYN) 500 MG tablet Take 1 tablet (500 mg total) by mouth 2 (two) times daily with meals. 4/20/21 4/20/22  Amaury Fernando MD     Anticoagulants/Antiplatelets: no anticoagulation    Allergies: Review of patient's allergies indicates:  No Known Allergies  Sedation History:  no adverse reactions    Review of Systems:   Hematological: no known coagulopathies  Respiratory: no shortness of breath  Cardiovascular: no chest pain  Gastrointestinal: no abdominal pain  Genito-Urinary: no dysuria  Musculoskeletal: negative  Neurological: no TIA or stroke symptoms         OBJECTIVE:     Vital Signs (Most Recent)  Temp: 98.8 °F (37.1 °C) (02/22/22 0847)  Pulse: 81 (02/22/22 0847)  Resp: 16 (02/22/22 0847)  BP: (!) 137/93 (02/22/22 0847)  SpO2: 97 % (02/22/22 0847)    Physical Exam:  ASA: II  Mallampati: III    General: no acute distress  Mental Status: alert and oriented to person, place and time  HEENT: normocephalic, atraumatic  Chest: unlabored breathing  Heart: regular heart rate  Abdomen: nondistended  Extremity: moves all  extremities    Laboratory  Lab Results   Component Value Date    INR 0.9 01/31/2022       Lab Results   Component Value Date    WBC 3.54 (L) 01/31/2022    HGB 15.3 01/31/2022    HCT 46.2 01/31/2022    MCV 93 01/31/2022     01/31/2022    No results found for: GLU, NA, K, CL, CO2, BUN, CREATININE, CALCIUM, MG, ALT, AST, ALBUMIN, BILITOT, BILIDIR    ASSESSMENT/PLAN:     Sedation Plan: moderate  Patient will undergo lumbar discogram.    Gil Souza, DO  PGY-III  Diagnostic and Interventional Radiology  [facility]

## 2022-02-22 NOTE — NURSING
Pt given 0.5 mg dilaudid per order for c/o 10/10 back pain after MD assessment. Pt now resting, eyes closed,  VSS.

## 2022-03-10 ENCOUNTER — TELEPHONE (OUTPATIENT)
Dept: NEUROSURGERY | Facility: CLINIC | Age: 47
End: 2022-03-10
Payer: COMMERCIAL

## 2022-03-10 NOTE — TELEPHONE ENCOUNTER
----- Message from Martin Cruz sent at 3/10/2022  8:20 AM CST -----  Regarding: Appt. Access  Contact: patient  Pt. Requesting a call back to schedule follow-up appt.         (To notify Ariadna of time and date of appt. [phone])      Pt. @[phone]

## 2022-03-23 ENCOUNTER — TELEPHONE (OUTPATIENT)
Dept: NEUROSURGERY | Facility: CLINIC | Age: 47
End: 2022-03-23
Payer: COMMERCIAL

## 2022-03-23 ENCOUNTER — OFFICE VISIT (OUTPATIENT)
Dept: NEUROSURGERY | Facility: CLINIC | Age: 47
End: 2022-03-23
Payer: MEDICAID

## 2022-03-23 VITALS — DIASTOLIC BLOOD PRESSURE: 96 MMHG | HEART RATE: 86 BPM | TEMPERATURE: 98 F | SYSTOLIC BLOOD PRESSURE: 137 MMHG

## 2022-03-23 DIAGNOSIS — M54.9 DORSALGIA, UNSPECIFIED: ICD-10-CM

## 2022-03-23 DIAGNOSIS — M51.36 DDD (DEGENERATIVE DISC DISEASE), LUMBAR: Primary | ICD-10-CM

## 2022-03-23 PROCEDURE — 99214 OFFICE O/P EST MOD 30 MIN: CPT | Mod: S$PBB,,, | Performed by: PHYSICIAN ASSISTANT

## 2022-03-23 PROCEDURE — 99999 PR PBB SHADOW E&M-EST. PATIENT-LVL III: CPT | Mod: PBBFAC,,, | Performed by: PHYSICIAN ASSISTANT

## 2022-03-23 PROCEDURE — 99213 OFFICE O/P EST LOW 20 MIN: CPT | Mod: PBBFAC | Performed by: PHYSICIAN ASSISTANT

## 2022-03-23 PROCEDURE — 99214 PR OFFICE/OUTPT VISIT, EST, LEVL IV, 30-39 MIN: ICD-10-PCS | Mod: S$PBB,,, | Performed by: PHYSICIAN ASSISTANT

## 2022-03-23 PROCEDURE — 99999 PR PBB SHADOW E&M-EST. PATIENT-LVL III: ICD-10-PCS | Mod: PBBFAC,,, | Performed by: PHYSICIAN ASSISTANT

## 2022-03-23 RX ORDER — CYCLOBENZAPRINE HCL 5 MG
5 TABLET ORAL 3 TIMES DAILY PRN
Qty: 60 TABLET | Refills: 0 | Status: SHIPPED | OUTPATIENT
Start: 2022-03-23 | End: 2022-04-02

## 2022-03-23 NOTE — TELEPHONE ENCOUNTER
Called and informed the pt the he is scheduled for his CT Scan on 4/1/22 for 2:15p. Pt expressed understanding.

## 2022-03-23 NOTE — PROGRESS NOTES
Neurosurgery  Established Patient    SUBJECTIVE:     History of Present Illness:  This is a 46-year-old male who is a worker's comp patient who we have been following for some time now.  Patient had a history of of hurting his back lifting a hot water heater in 2020. He has been experiencing significant intractable back pain and radiculopathy and has failed physical therapy, gabapentin, Medrol Dosepak, pain medication as well as epidural and transforaminal injections.  Unfortunately nothing has really helped.  His pain continues to get worse and now impairing his daily living and impacting his ability to return to work.  His EMG does show active denervation of L5 and S1. He was scheduled for lumbar TLIF after his last visit but this was denied. He was referred for a second opinion through worker's comp as they did not feel he qualified for surgery at that time. The physician recommended that he obtain a discogram for further evaluation of his pain. His discogram was positive at L4-5, L5-S1 for DDD and severe concordant pain with injections at these levels. There was a normal control at L2-3. He denies any change in his back pain since his last visit. He continues with lateral right leg pain and weakness in his left foot. He denies new bowel or bladder incontinence.    He also continues wtih right knee pain and has been walking with a walker. he does have swelling, guarding and pain with palpation to his right knee.    Review of patient's allergies indicates:  No Known Allergies    Current Outpatient Medications   Medication Sig Dispense Refill    acetaminophen (TYLENOL) 500 MG tablet Take 1 tablet (500 mg total) by mouth 2 (two) times a day. 60 tablet 0    cyclobenzaprine (FLEXERIL) 5 MG tablet Take 1 tablet (5 mg total) by mouth 3 (three) times daily as needed for Muscle spasms. 60 tablet 0    naproxen (NAPROSYN) 500 MG tablet Take 1 tablet (500 mg total) by mouth 2 (two) times daily with meals. 60 tablet 1      No current facility-administered medications for this visit.       Past Medical History:   Diagnosis Date    Seizures      Past Surgical History:   Procedure Laterality Date    ABDOMINAL SURGERY      APPENDECTOMY      BACK SURGERY      COLON SURGERY      TRANSFORAMINAL EPIDURAL INJECTION OF STEROID Right 07/12/2021    Procedure: LUMBAR TRANSFORAMINAL RIGHT L4/5 DIRECT REFERRAL NEEDS CONSENT;  Surgeon: Ramya Lundy MD;  Location: [facility];  Service: Pain Management;  Laterality: Right;     Family History     Problem Relation (Age of Onset)    Cancer Mother    Heart attack Mother    No Known Problems Father        Social History     Socioeconomic History    Marital status:    Tobacco Use    Smoking status: Current Every Day Smoker     Packs/day: 0.50     Years: 10.00     Pack years: 5.00     Types: Cigarettes    Smokeless tobacco: Never Used   Substance and Sexual Activity    Alcohol use: No     Comment: socially    Drug use: No       Review of Systems  Constitutional: no fever, chills or night sweats. No changes in weight   Eyes: no visual changes   ENT: no nasal congestion or sore throat   Respiratory: no cough or shortness of breath   Cardiovascular: no chest pain or palpitations   Gastrointestinal: no nausea or vomiting   Genitourinary: no hematuria or dysuria   Integument/Breast: no rash or pruritis   Hematologic/Lymphatic: no easy bruising or lymphadenopathy   Musculoskeletal: +arthralgias +myalgias, + back pain  Neurological: no seizures or tremors, + weakness  Behavioral/Psych: no auditory or visual hallucinations   Endocrine: no heat or cold intolerance   OBJECTIVE:     Vital Signs  Temp: 97.7 °F (36.5 °C)  Pulse: 86  BP: (!) 137/96  Pain Score: 10-Worst pain ever  There is no height or weight on file to calculate BMI.    Neurosurgery Physical Exam   General: well developed, well nourished, no distress.   Head: normocephalic, atraumatic  Neurologic: Alert and oriented. Thought  content appropriate.  GCS: Motor: 6/Verbal: 5/Eyes: 4 GCS Total: 15  Mental Status: Awake, Alert, Oriented x3  Cranial nerves: face symmetric, tongue midline, CN II-XII grossly intact.   Eyes: pupils equal, round, reactive to light with accomodation, EOMI.   Sensory: intact to light touch throughout  Motor Strength:Moves all extremities spontaneously with good tone.  Full strength upper and lower extremities. No abnormal movements seen.     Strength  Deltoids Triceps Biceps Wrist Extension Wrist Flexion Hand    Upper: R 5/5 5/5 5/5 5/5 5/5 5/5    L 5/5 5/5 5/5 5/5 5/5 5/5     Iliopsoas Quadriceps Knee  Flexion Tibialis  anterior Gastro- cnemius EHL   Lower: R 5/5 5/5 5/5 4/5 4/5 4/5    L 5/5 5/5 5/5 5/5 5/5 5/5     DTR's - 2 + and symmetric in UE and LE except right ankle 1+  [place]: absent  Clonus: absent  Pulses: 2+ and symmetric radial and dorsalis pedis. No lower extremity edema  Straight leg raise: positive on the right   Gait: antalgic, walks with a cane    SI Joint tenderness: Negative   Pain on Hip ROM: Negative  Pain with palpation of the right knee. Mild swelling to the right knee, no erythema   Pain with flexion/extension of the right knee  TTP midline and paraspinal to Lumbar spine      Diagnostic Results:  I have reviewed his MRI scan and CT scan of the lumbar spine dated 10/2021. I agree that he has extensive disc and facet disease at L4-5 and L5-S1.  Is clear evidence of annular tear at L4-5 and L5-S1.  Loss disc height.  There is right-sided foraminal narrowing bilaterally.  There is facet inflammation at both levels.    EMG with acute on chronic denervation in the left L5 and/or S1 myotomes suggestive of radiculopathy at those levels.     Discogram 2/22/22 reviewed. There was normal control at L2-3. L5-S1 and L4-5 had degenerative disc disease with annular tears and severe concordant pain with injections. These are positive discogram levels. L3-4 had posterior epidural tear and pain with  injection, but this was atypical compared to his usual pain.      ASSESSMENT/PLAN:     46-year-old male with significant and ongoing lower back pain with right-sided radiculopathy.  MRI lumabr spine with extensive changes at L4-5 and his symptoms really began after the injury itself.  He has failed physical therapy, pain medications and injections.  I do not agree that a CT myelogram is needed as we have an adequate MRI lumbar spine. Lumbar discogram was positive for discogenic pain at L4-5, L5-S1 with a normal control at L2-3.  At this stage, we have exhausted all conservative measures and it is recommended that surgical intervention is warranted. After discussion with the pt and Dr. Rhodes, we have offered L4-5 TLIF with L5-S1 posterior fixation. R/B/A were reviewed with the patient and all of his questions were answered. Informed consent was obtained. We will need to obtain a repeat CT lumbar spine for surgical planning. I will also obtain xrays of his right knee and refer him to ortho for evaluation of this prior to surgery.    All symptoms and signs that require emergent or urgent treatment were reviewed. The plan was discussed with the patient. All of the the patient's questions and concerns were answered and he voiced understanding. Please feel free to call with any further questions.       Catalina Wolff PA-C  Neurosurgery    Note dictated with voice recognition software, please excuse any grammatical errors.

## 2022-03-29 ENCOUNTER — TELEPHONE (OUTPATIENT)
Dept: NEUROSURGERY | Facility: CLINIC | Age: 47
End: 2022-03-29
Payer: COMMERCIAL

## 2022-03-29 NOTE — TELEPHONE ENCOUNTER
Spoke with pt. about getting records sent to long-term disability. Advised him to call disability desk and gave him the phone number ([phone]).

## 2022-03-29 NOTE — TELEPHONE ENCOUNTER
----- Message from Tremontana Chevalier sent at 3/29/2022 12:25 PM CDT -----  Regarding: pt advise  Contact: Junior @ [phone]  Patient req to s/w someone in Dr. Rhodes's office regarding papers he needs completed.

## 2022-03-31 ENCOUNTER — TELEPHONE (OUTPATIENT)
Dept: NEUROSURGERY | Facility: CLINIC | Age: 47
End: 2022-03-31
Payer: COMMERCIAL

## 2022-03-31 ENCOUNTER — TELEPHONE (OUTPATIENT)
Dept: PREADMISSION TESTING | Facility: HOSPITAL | Age: 47
End: 2022-03-31
Payer: COMMERCIAL

## 2022-03-31 ENCOUNTER — PATIENT MESSAGE (OUTPATIENT)
Dept: NEUROSURGERY | Facility: CLINIC | Age: 47
End: 2022-03-31
Payer: COMMERCIAL

## 2022-03-31 DIAGNOSIS — M47.816 LUMBAR SPONDYLOSIS: Primary | ICD-10-CM

## 2022-03-31 DIAGNOSIS — Z98.1 S/P LUMBAR SPINAL FUSION: Primary | ICD-10-CM

## 2022-03-31 DIAGNOSIS — Z01.818 PREOPERATIVE TESTING: Primary | ICD-10-CM

## 2022-03-31 NOTE — ANESTHESIA PAT ROS NOTE
03/31/2022  Junior Garay is a 47 y.o., male.      Pre-op Assessment          Review of Systems         Anesthesia Assessment: Preoperative EQUATION    Planned Procedure: Procedure(s) (LRB):  FUSION, SPINE, LUMBAR, TLIF, MINIMALLY INVASIVE L4-5, L5-S1 (N/A)  Requested Anesthesia Type:General  Surgeon: Humberto Rhdoes MD  Service: Neurosurgery  Known or anticipated Date of Surgery:4/28/2022, Date change 6/2/2022    Surgeon notes: reviewed    Electronic QUestionnaire Assessment completed via nurse interview with patient.        Triage considerations:         Previous anesthesia records:No problems and Not available    Last PCP note: > 1 year ago , outside [facility]   Subspecialty notes: Neurosurgery    Other important co-morbidities:PER Epic:  Smoker and L-DDD, H/O SEIZURES      Tests already available:  Available tests,  within 3 months , 6-12 months ago , within [facility] .1/31/2022 PT/INR CBC, 7/9/2021 CT LUMBAR SPINE W/O CONTRAST             Instructions given. (See in Nurse's note)    Optimization:  Anesthesia Preop Clinic Assessment  Indicated    Medical Opinion Indicated           Plan:    Testing:  BMP, PTT, T&S and UA Will need additional lab with surgery date change: PT/INR, CBC & T&S ( Will get T&S in am of surgery)   Pre-anesthesia  visit       Visit focus: concerns in complex and/or prolonged anesthesia, position other than supine     Consultation:IM Perioperative Hospitalist/ NP     Patient  has previously scheduled Medical Appointment:4/1 POC, XRAY,& CT    Navigation: Tests Scheduled. TBD             Consults scheduled.TBD             Results will be tracked by Preop Clinic.  4/27 Labs( PTT, BMP, T&S) and UA resulted and noted.  5/17 Labs ( PT/INR, CBC) resulted and noted by Dr. Alie Valiente..  5/18 Medical optimization by Sumeet Madden NP on 5/17.  Casie Henry RN BSN

## 2022-03-31 NOTE — TELEPHONE ENCOUNTER
"----- Message from Alec Mina MA sent at 3/28/2022  1:25 PM CDT -----    ----- Message -----  From: Machelle Jones  Sent: 3/28/2022   1:06 PM CDT  To: Meagan DALEY Staff    "Type:  Patient Call Back    Who Called:Jenelle ([facility])    What is the reqeust in detail:Requesting call back to get some information from pt last visit. Please advise    Can the clinic reply by [facility]? no    Best Call Back Number:[phone]      Additional Information:              "

## 2022-03-31 NOTE — PRE-PROCEDURE INSTRUCTIONS
Patient stated has not had any problem with anesthesia in the past. Will need medical clearance from your PCP, Dr. Mark Littlejohn. He has not seen him in over 2 years. Offered to see NP for medical optimization. He is agreeable to this.Will need poc appt,  labs,and ua. Our  will call to set up these appts.  Preop instructions given. Hold aspirin, aspirin containing products, nsaids(aleve, advil, motrin, ibuprofen, naprosyn, naproxen, voltaren, diclofenac), vitamins and supplements one week prior to surgery.    May take Tylenol. ( mailed to patient)  Verbalize understanding.

## 2022-04-01 ENCOUNTER — HOSPITAL ENCOUNTER (OUTPATIENT)
Dept: PREADMISSION TESTING | Facility: HOSPITAL | Age: 47
Discharge: HOME | End: 2022-04-01

## 2022-04-01 ENCOUNTER — HOSPITAL ENCOUNTER (OUTPATIENT)
Dept: RADIOLOGY | Facility: HOSPITAL | Age: 47
Discharge: HOME | End: 2022-04-01
Attending: PHYSICIAN ASSISTANT
Payer: MEDICAID

## 2022-04-01 ENCOUNTER — HOSPITAL ENCOUNTER (OUTPATIENT)
Dept: RADIOLOGY | Facility: HOSPITAL | Age: 47
Discharge: HOME | End: 2022-04-01
Attending: PHYSICIAN ASSISTANT
Payer: COMMERCIAL

## 2022-04-01 DIAGNOSIS — M25.569 KNEE PAIN, UNSPECIFIED CHRONICITY, UNSPECIFIED LATERALITY: ICD-10-CM

## 2022-04-01 DIAGNOSIS — M54.9 DORSALGIA, UNSPECIFIED: ICD-10-CM

## 2022-04-01 PROCEDURE — 72131 CT LUMBAR SPINE W/O DYE: CPT | Mod: TC

## 2022-04-01 PROCEDURE — 73502 X-RAY EXAM HIP UNI 2-3 VIEWS: CPT | Mod: TC,RT

## 2022-04-01 PROCEDURE — 73502 XR HIP WITH PELVIS WHEN PERFORMED, 2 OR 3  VIEWS RIGHT: ICD-10-PCS | Mod: 26,RT,, | Performed by: RADIOLOGY

## 2022-04-01 PROCEDURE — 73562 X-RAY EXAM OF KNEE 3: CPT | Mod: TC,RT

## 2022-04-01 PROCEDURE — 73562 XR KNEE 3 VIEW RIGHT: ICD-10-PCS | Mod: 26,RT,, | Performed by: RADIOLOGY

## 2022-04-01 PROCEDURE — 73502 X-RAY EXAM HIP UNI 2-3 VIEWS: CPT | Mod: 26,RT,, | Performed by: RADIOLOGY

## 2022-04-01 PROCEDURE — 72131 CT LUMBAR SPINE W/O DYE: CPT | Mod: 26,,, | Performed by: RADIOLOGY

## 2022-04-01 PROCEDURE — 73562 X-RAY EXAM OF KNEE 3: CPT | Mod: 26,RT,, | Performed by: RADIOLOGY

## 2022-04-01 PROCEDURE — 72131 CT LUMBAR SPINE WITHOUT CONTRAST: ICD-10-PCS | Mod: 26,,, | Performed by: RADIOLOGY

## 2022-04-05 ENCOUNTER — TELEPHONE (OUTPATIENT)
Dept: NEUROSURGERY | Facility: CLINIC | Age: 47
End: 2022-04-05
Payer: COMMERCIAL

## 2022-04-05 NOTE — TELEPHONE ENCOUNTER
----- Message from Glenis Givens sent at 4/5/2022 11:35 AM CDT -----  Contact: pt  Pt requesting call back in regards to his medical records being faxed over for his disability.       Confirmed patient's contact info below:  Contact Name: Junior Garay  Phone Number: [phone]        Confirmed patient's contact info below:  Contact Name Giovanni   Phone Number: [phone] fax

## 2022-04-19 DIAGNOSIS — Z98.1 S/P LUMBAR SPINAL FUSION: ICD-10-CM

## 2022-04-19 DIAGNOSIS — M47.816 LUMBAR SPONDYLOSIS: Primary | ICD-10-CM

## 2022-04-19 DIAGNOSIS — M54.10 RADICULOPATHY, UNSPECIFIED SPINAL REGION: ICD-10-CM

## 2022-04-19 DIAGNOSIS — M51.9 LUMBAR DISC DISEASE: ICD-10-CM

## 2022-04-19 DIAGNOSIS — M51.36 DDD (DEGENERATIVE DISC DISEASE), LUMBAR: ICD-10-CM

## 2022-04-20 ENCOUNTER — TELEPHONE (OUTPATIENT)
Dept: PREADMISSION TESTING | Facility: HOSPITAL | Age: 47
End: 2022-04-20
Payer: COMMERCIAL

## 2022-04-20 ENCOUNTER — TELEPHONE (OUTPATIENT)
Dept: NEUROSURGERY | Facility: CLINIC | Age: 47
End: 2022-04-20
Payer: COMMERCIAL

## 2022-04-20 NOTE — TELEPHONE ENCOUNTER
called and LM on VM . We are still working on getting approval . I asked preop if they will rescheduled his preop for next week

## 2022-04-20 NOTE — TELEPHONE ENCOUNTER
----- Message from Zaida Hummel sent at 4/20/2022 11:42 AM CDT -----  Regarding: Surgery Inquiry  Pt called about cancelled labs and if he is still having surgery on 4/28?    Can patient be contacted on [facility]:Yes       Requesting Call back number:[phone]

## 2022-04-20 NOTE — TELEPHONE ENCOUNTER
----- Message from Enid Herrera RN sent at 4/20/2022 11:51 AM CDT -----  This patient notified that his preop was cancelled . We have him scheduled for the psyche eval today and tomorrow then were going to resubmit to workmans comp and still may be approved. Can we get him rescheduled for his preop next week

## 2022-04-21 ENCOUNTER — OFFICE VISIT (OUTPATIENT)
Dept: PSYCHIATRY | Facility: CLINIC | Age: 47
End: 2022-04-21
Payer: MEDICAID

## 2022-04-21 ENCOUNTER — TELEPHONE (OUTPATIENT)
Dept: PREADMISSION TESTING | Facility: HOSPITAL | Age: 47
End: 2022-04-21
Payer: COMMERCIAL

## 2022-04-21 DIAGNOSIS — M54.10 RADICULOPATHY, UNSPECIFIED SPINAL REGION: ICD-10-CM

## 2022-04-21 DIAGNOSIS — Z01.818 PREOPERATIVE EVALUATION TO RULE OUT SURGICAL CONTRAINDICATION: Primary | ICD-10-CM

## 2022-04-21 DIAGNOSIS — M51.36 DDD (DEGENERATIVE DISC DISEASE), LUMBAR: ICD-10-CM

## 2022-04-21 DIAGNOSIS — F43.21 ADJUSTMENT DISORDER WITH DEPRESSED MOOD: ICD-10-CM

## 2022-04-21 DIAGNOSIS — M47.816 LUMBAR SPONDYLOSIS: ICD-10-CM

## 2022-04-21 PROCEDURE — 96138 PSYCL/NRPSYC TECH 1ST: CPT | Mod: AH,HB,95, | Performed by: PSYCHOLOGIST

## 2022-04-21 PROCEDURE — 96130 PSYCL TST EVAL PHYS/QHP 1ST: CPT | Mod: AH,HB,95, | Performed by: PSYCHOLOGIST

## 2022-04-21 PROCEDURE — 96130 PR PSYCHOLOGIC TEST EVAL SVCS, 1ST HR: ICD-10-PCS | Mod: AH,HB,95, | Performed by: PSYCHOLOGIST

## 2022-04-21 PROCEDURE — 96131 PSYCL TST EVAL PHYS/QHP EA: CPT | Mod: AH,HB,95, | Performed by: PSYCHOLOGIST

## 2022-04-21 PROCEDURE — 96138 PR PSYCH/NEUROPSYCH TEST ADMIN/SCORING, BY TECH, 2+ TESTS, 1ST 30 MIN: ICD-10-PCS | Mod: AH,HB,95, | Performed by: PSYCHOLOGIST

## 2022-04-21 PROCEDURE — 90791 PR PSYCHIATRIC DIAGNOSTIC EVALUATION: ICD-10-PCS | Mod: AH,HB,95, | Performed by: PSYCHOLOGIST

## 2022-04-21 PROCEDURE — 96139 PR PSYCH/NEUROPSYCH TEST ADMIN/SCORING, BY TECH, 2+ TESTS, EA ADDTL 30 MIN: ICD-10-PCS | Mod: AH,HB,95, | Performed by: PSYCHOLOGIST

## 2022-04-21 PROCEDURE — 96131 PR PSYCHOLOGIC TEST EVAL SVCS, EA ADDTL HR: ICD-10-PCS | Mod: AH,HB,95, | Performed by: PSYCHOLOGIST

## 2022-04-21 PROCEDURE — 90791 PSYCH DIAGNOSTIC EVALUATION: CPT | Mod: AH,HB,95, | Performed by: PSYCHOLOGIST

## 2022-04-21 PROCEDURE — 96139 PSYCL/NRPSYC TST TECH EA: CPT | Mod: AH,HB,95, | Performed by: PSYCHOLOGIST

## 2022-04-21 NOTE — PSYCH TESTING
[facility] [address] [place]  [address]  [phone]    REPORT OF PSYCHOLOGICAL TESTING    NAME:  Junior Garay  MRN: [id number]  : 1975    REFERRED BY: Humberto Rhodes M.D.    REASON FOR REFERRAL:  Psychological Evaluation prior to surgical implantation of a spinal cord stimulator (SCS) to address chronic pain    EVALUATED BY:  Radha Bowling, Ph.D., Clinical Psychologist  CHUY Almanzar, Psychometrician     DATES OF EVALUATION: 22, 22    EVALUATION PROCEDURES AND TIMES:  Conducted by Psychologist:  Integration of patient data, interpretation of standardized test results and clinical data, clinical decision-making, treatment planning and report, and interactive feedback to the patient  CPT Codes:  53582 - 1 hour; 06677 - 1 hour  Conducted by Technician:  Psychological test administration and scoring by technician, two or more tests, any method:  [place] Multiphasic Personality Inventory -3 (MMPI-3); Pain and Impairment Relationship Scale (PAIRS); Dash Pain Catastrophizing Scale (PCS)  CPT Codes:  98499 - 30 minutes; 04186 - 30 minutes, 30261 - 30 minutes (2 additional units)    EVALUATION FINDINGS:  The diagnostic interview revealed that Mr. Garay is reporting significant symptoms of depression related to his pain. He has been unable to work or engage in much physical activity. He spends most of the day in bed or in his recliner. He endorsed low mood, anhedonia, and feelings of hopelessness. He denied problems with substance abuse, suicidal or homicidal ideation, psychotic symptoms, and cognitive functioning.    TEST DATA:  Psychological Testing data revealed that he was fully cooperative and engaged in the assessment process.  Effort on all tests was satisfactory to produce valid results.    MMPI-3.  The MMPI-3 provides an assessment of personality and psychopathology with specific evaluation of psychosocial risk factors associated with outcomes of  spinal surgery.  Mr. Garay produced an interpretable MMPI-3 profile despite evidence of potential under-reporting and over-reporting (specifically of non-credible memory complaints).  Any absence of problems in the test results does not rule out symptoms or the possibility that certain treatment approaches could prove helpful for optimizing the candidates outcomes.  Test results regarding somatic complaints scales may be present in individuals with substantial problems who report credible symptoms; however, in the absence of corroborating information it may reflect exaggeration or an attempt to portray great distress.  This profile should be interpreted with these issues in mind.   TEST RESULTS.  Mr. Garay reports significant somatic symptoms, including feeling weak, fatigued, and incapacitated. He is reporting a lack of positive emotional experiences, anhedonia, and an above-average level of stress. He is likely shy or introverted and tends to avoid social situations.  GROUP COMPARISONS.  Compared to other spine surgery candidates, Mr. Garay endorsed greater levels of emotional distress (demoralization and low positive emotions), thought dysfunction (ideas of persecution), and social avoidance.   RISK ASSESSMENTS.  The following likelihoods are based on test results and research, and are correlational rather than causational.  Pateints with increased worry and stress are at heightened risk for adverse postsurgical outcomes.  Post-surgery, Mr. Garay is more likely to report greater levels of disability and dissatisfaction with SCS and surgery if worry and stress are heightened.  RECOMMENDATIONS.  Test data provides treatment recommendations that may help Mr. Garay achieve better outcomes. Antidepressant medication and therapy to target anhedonia and low mood is suggested.     PAIRS and PCS.  The PAIRS and PCS reveal beliefs and attitudes related to pain that may impact outcomes of spinal cord stimulation.   The PAIRS indicated significant complications related to perceptions of impairment with a total score of 82 (a score over 75 is clinically significant).  The PCS indicated significant catastrophizing of pain with a total score of 52, which is in the 100th percentile (a score over 30 is in the 75th percentile and is clinically significant).  His subscale scores indicated significant Rumination (100th percentile), significant Magnification (100th percentile), and significant Helplessness (100th percentile).    FEEDBACK.  Mr. Garay was provided with test results, and offered the opportunity to respond to feedback and clarify results if needed.    DIAGNOSTIC IMPRESSIONS:    ICD-10-CM ICD-9-CM   1. Preoperative evaluation to rule out surgical contraindication  Z01.818 V72.83   2. Lumbar spondylosis  M47.816 721.3   3. DDD (degenerative disc disease), lumbar  M51.36 722.52   4. Radiculopathy, unspecified spinal region  M54.10 729.2   5. Adjustment disorder with depressed mood  F43.21 309.0       SUMMARY AND RECOMMENDATIONS:  Mr. Garay has a long history of severe pain and is pursuing the spinal surgery to improve pain and quality of life.  Test results should be considered valid/interpretable, and indicate that he reports significant levels of low mood, stress, and maladaptive pain coping.  Based on research and recommendations regarding presurgical psychological screening for pain control procedures, his test results and reports are associated with moderate risks for adverse postsurgical outcomes (greater pain, distress, disability, and dissatisfaction with surgery results).    While no absolute contraindications were observed (e.g., active substance use, suicidality, etc.), Mr. Berrys depression and lack of support are concerning and it is recommended that they should be addressed, at least in part, prior to scheduling surgery.     The following strategies were recommended to mitigate risks:  1. Referral to  psychotherapy for chronic pain with Lauryn Nj PsyD ([facility]).   2. Discuss starting an antidepressant medication with his PCP or establish care with a prescribing provider in Dept. Of Psychiatry   3. Consider Functional Restoration Program.

## 2022-04-21 NOTE — PROGRESS NOTES
Psychiatry Initial Visit (PhD/LCSW)    NAME:  Junior Garay  MRN: [id number]  : 1975      Date:  2022  Site:  The patient location is: Home ([place])  The chief complaint leading to consultation is: presurgical eval    Visit type: audiovisual    Face to Face time with patient: 45  120 minutes of total time spent on the encounter, which includes face to face time and non-face to face time preparing to see the patient (eg, review of tests), Obtaining and/or reviewing separately obtained history, Documenting clinical information in the electronic or other health record, Independently interpreting results (not separately reported) and communicating results to the patient/family/caregiver, or Care coordination (not separately reported).     Each patient to whom he or she provides medical services by telemedicine is:  (1) informed of the relationship between the physician and patient and the respective role of any other health care provider with respect to management of the patient; and (2) notified that he or she may decline to receive medical services by telemedicine and may withdraw from such care at any time.    Notes:     CPT Code: 50347  Clinical status of patient:  Outpatient  Met with:  Patient  Referred by:  Humberto Rhodes M.D.    Chief complaint/reason for encounter:  Psychological Evaluation prior to surgical implantation of a spinal cord stimulator (SCS) to address chronic pain    Before this evaluation was initiated, the purposes and process of the assessment and the limits of confidentiality were discussed with the patient who expressed understanding of these issues and verbally consented to proceed with the evaluation.    History of present illness:  Mr. Junior Garay is a 47-year-old  male referred for Psychological Evaluation prior to lumbar spinal fusion surgery.  Patient has a history of hurting his back lifting a hot water heater while at work in . He has been  experiencing significant intractable back pain, right leg pain and weakness, and radiculopathy. He was scheduled for lumbar TLIF in 2021 but this was denied. He was referred for a second opinion through worker's comp as they did not feel he qualified for surgery at that time. After further testing and multiple failed alternative interventions, it is still recommended to proceed with surgical intervention. He has tried physical therapy, medications, Medrol Dosepak, and epidural and transforaminal injections without receiving sufficient relief.  His activities are greatly limited.  He states, I dont do anything. I have to pull a chair up to the stove to cook. Otherwise, I stay in the bed or in my recliner all day. I cant even  my son.  He ambulates with the help of a walker or cane.     Mr. Garay familiar with the procedures involved in the surgery.  He understood risks of surgery including bleeding, infection, failure of surgery, misplaced hardware, migration of hardware, need for reoperation, etc.  When asked about potential concerns regarding lumbar TLIF, he indicated stated he just hopes it works so he can get back to being active.  He is motivated to be active with his family, attend football games and other family outings, and return to work.  He states his girlfriend will be available to help him during recovery after surgery but she works 2-3 days per week.  He stated his sister may be able to help on these days, but it is likely that he will be home alone while his girlfriend works.    Mr. Garay denied past psychiatric treatment and history.  He currently reports being claustrophobic, stating he cannot ride in back seat of cars or do enclosed scans. He also reported feeling depressed related to his pain and related lifestyle changes that have occurred (no working, limited mobility).   He was pleasant and cooperative in interview.    Medical history:  DDD    Pain scales:   Current level of pain:   6/10  Worst pain rating: 10 /10  Best pain ratin/10    Psychiatric Symptoms:  Depression:  Endorsed low mood or depression-related anhedonia, lack of motivation, lethargy, difficulty concentrating, feelings of worthlessness, hopelessness. He reports this is largely related to his pain and all of the things he can no longer do as a result of his pain.   Eleanor/Hypomania:  Denied.  He denied periods of elevated mood or abnormally increased energy or goal-directed activity.  Anxiety:  He endorsed worry about being able to do what he used to do, sometimes feeling restless or nervous.  Thoughts:  Denied delusions, hallucinations.  Suicidal thoughts/behaviors:  Denied.  Self-injury:  Denied.  Substance abuse:  Denied abuse or dependence.  Sleep: I cant sleep on right side or flat on my back. I probably get about 4 hours per night. I doze off sometimes during the day  Cognitive functioning:  Denied problems.    Current psychosocial stressors:  not working, pain  Report of coping skills:  try to walk down to the end of the driveway, watch TV  Support system:  sisters and brother, girlfriend    Current and past substance use:  Alcohol: Rarely, only for special occasions; denied history of abuse or dependency.   Drugs: Denied current use; denied history of abuse or dependency.  Tobacco: None. Stopped smoking 2 months ago  Caffeine: None    Current Psychiatric Treatment:  Medications:  Denied.  Psychotherapy:  Denied.    Psychiatric History:  Previous diagnosis: none  Previous hospitalizations: none  History of outpatient treatment: none  Previous suicide attempt:  Denied.  Family history of psychiatric illness:  None reported.    Trauma history:  denied    Social history (marriage, employment, etc.):    Mr. Garay was born and raised in [place], [place] by his biological parents. He described his childhood as average. He denied childhood trauma, abuse, and neglect. He graduated high school.  He denied being enrolled in  special education or being held back.  He denied significant detentions, suspensions, and expulsions.  He previously worked as a  at [employer]. He is currently on long-term disability through work due to his back injury. He is single and has 6 children (ages 28, 28, 21, 19, 16, and 4).  He currently lives with his sister.     Legal history:  Denies    Mental Status Exam:  General appearance:  appears stated age, neatly dressed, well groomed  Speech:  normal rate and tone  Level of cooperation:  cooperative  Thought processes:  logical, goal-directed  Mood:  euthymic  Thought content:  no illusions, no visual hallucinations, no auditory hallucinations, no delusions, no active or passive homicidal thoughts, no active or passive suicidal ideation, no obsessions, no compulsions, no violence  Affect:  appropriate  Orientation:  oriented to person, place, and date  Memory:  Recent memory:  intact  Remote memory - intact  Attention span and concentration:  appropriate  Fund of general knowledge: appropriate  Judgment and insight: fair  Language:  intact    Diagnostic impressions:    ICD-10-CM ICD-9-CM   1. Preoperative evaluation to rule out surgical contraindication  Z01.818 V72.83   2. Lumbar spondylosis  M47.816 721.3   3. DDD (degenerative disc disease), lumbar  M51.36 722.52   4. Radiculopathy, unspecified spinal region  M54.10 729.2   5. Adjustment disorder with depressed mood  F43.21 309.0         Plan and Recommendations:  Mr. Garay completed psychological testing.  The testing report of this psychological evaluation will follow in the Notes folder in the patients chart in the encounter titled Psychological Testing.    Mr. Garay's results indicate that he reports significant emotional distress including heightened depression, stress, self-doubt, and inefficacy.  He endorsed preoccupation with poor health and maladaptive pain coping.  His results also indicated interpersonal passivity.   Mr. Berrys results related to emotional distress and maladaptive pain coping are associated with moderate risks for adverse postsurgical outcomes (greater pain, distress, disability, and dissatisfaction with surgery results).  He endorsed the test results as being largely consistent with his current symptoms but states he has never struggled with these issues prior to his back injury and believes if his pain were reduced, all of these concerns would remit as well. While no absolute contraindications were observed (e.g., active substance use, suicidality, etc.),  it is recommended that Mr. Berrys depression and lack of positive pain coping should be addressed, at least in part, prior to scheduling surgery.    The following strategies were recommended to mitigate risks:  1. Referral to psychotherapy for chronic pain with Lauryn Nj PsyD ([facility]).   2. Discuss starting an antidepressant medication with his PCP or establish care with a prescribing provider in Dept. Of Psychiatry   3. Consider Functional Restoration Program.

## 2022-04-21 NOTE — TELEPHONE ENCOUNTER
----- Message from Casie Henry RN sent at 4/21/2022  3:13 PM CDT -----  Surgery 4/28  other appts 4/26   Please reschedule  labs and ua.   Thanks!

## 2022-04-21 NOTE — Clinical Note
Patient completed psychological evaluation prior to spinal surgery. He is reporting significant levels of depression, stress, and lack of positive pain coping skills. While these are not absolute contraindications, it is recommended that he consider supportive therapy and/or antidepressant medications prior to scheduling surgery.

## 2022-04-25 PROBLEM — Z87.898 HISTORY OF SEIZURES: Status: ACTIVE | Noted: 2022-04-25

## 2022-04-25 PROBLEM — Z72.0 TOBACCO USE: Status: ACTIVE | Noted: 2022-04-25

## 2022-04-25 PROBLEM — I10 HTN (HYPERTENSION): Status: ACTIVE | Noted: 2022-04-25

## 2022-04-25 NOTE — ASSESSMENT & PLAN NOTE
Blood pressure consistently greater than 120//80 in medical record    Currently not on any BP medications  Today's /83  States he stopped smoking cigarettes which may be allowing his BP to normalize    Encourage home blood pressure checks twice per week to assess if he is over the goal of 120/80 or less

## 2022-04-26 ENCOUNTER — LAB VISIT (OUTPATIENT)
Dept: LAB | Facility: HOSPITAL | Age: 47
End: 2022-04-26
Attending: ANESTHESIOLOGY
Payer: COMMERCIAL

## 2022-04-26 ENCOUNTER — OFFICE VISIT (OUTPATIENT)
Dept: INTERNAL MEDICINE | Facility: CLINIC | Age: 47
End: 2022-04-26
Payer: COMMERCIAL

## 2022-04-26 VITALS
OXYGEN SATURATION: 98 % | HEIGHT: 70 IN | TEMPERATURE: 98 F | BODY MASS INDEX: 30.49 KG/M2 | HEART RATE: 83 BPM | WEIGHT: 213 LBS | SYSTOLIC BLOOD PRESSURE: 122 MMHG | DIASTOLIC BLOOD PRESSURE: 83 MMHG

## 2022-04-26 DIAGNOSIS — I10 HYPERTENSION, UNSPECIFIED TYPE: Primary | ICD-10-CM

## 2022-04-26 DIAGNOSIS — M51.36 DDD (DEGENERATIVE DISC DISEASE), LUMBAR: ICD-10-CM

## 2022-04-26 DIAGNOSIS — Z87.898 HISTORY OF SEIZURES: ICD-10-CM

## 2022-04-26 DIAGNOSIS — Z01.818 PREOPERATIVE TESTING: ICD-10-CM

## 2022-04-26 DIAGNOSIS — Z72.0 TOBACCO USE: ICD-10-CM

## 2022-04-26 DIAGNOSIS — F32.A DEPRESSION, UNSPECIFIED DEPRESSION TYPE: ICD-10-CM

## 2022-04-26 LAB
BACTERIA #/AREA URNS AUTO: ABNORMAL /HPF
BILIRUB UR QL STRIP: NEGATIVE
CLARITY UR REFRACT.AUTO: CLEAR
COLOR UR AUTO: YELLOW
GLUCOSE UR QL STRIP: NEGATIVE
HGB UR QL STRIP: NEGATIVE
KETONES UR QL STRIP: NEGATIVE
LEUKOCYTE ESTERASE UR QL STRIP: ABNORMAL
MICROSCOPIC COMMENT: ABNORMAL
NITRITE UR QL STRIP: NEGATIVE
PH UR STRIP: 8 [PH] (ref 5–8)
PROT UR QL STRIP: NEGATIVE
RBC #/AREA URNS AUTO: 1 /HPF (ref 0–4)
SP GR UR STRIP: 1.01 (ref 1–1.03)
URN SPEC COLLECT METH UR: ABNORMAL
WBC #/AREA URNS AUTO: 7 /HPF (ref 0–5)

## 2022-04-26 PROCEDURE — 99214 PR OFFICE/OUTPT VISIT, EST, LEVL IV, 30-39 MIN: ICD-10-PCS | Mod: S$GLB,,, | Performed by: NURSE PRACTITIONER

## 2022-04-26 PROCEDURE — 99999 PR PBB SHADOW E&M-EST. PATIENT-LVL IV: CPT | Mod: PBBFAC,,, | Performed by: NURSE PRACTITIONER

## 2022-04-26 PROCEDURE — 81001 URINALYSIS AUTO W/SCOPE: CPT | Performed by: ANESTHESIOLOGY

## 2022-04-26 PROCEDURE — 99999 PR PBB SHADOW E&M-EST. PATIENT-LVL IV: ICD-10-PCS | Mod: PBBFAC,,, | Performed by: NURSE PRACTITIONER

## 2022-04-26 PROCEDURE — 99214 OFFICE O/P EST MOD 30 MIN: CPT | Mod: S$GLB,,, | Performed by: NURSE PRACTITIONER

## 2022-04-26 NOTE — PATIENT INSTRUCTIONS
Preventive perioperative care    Thromboembolic prophylaxis:  His risk factors for thrombosis include obesity, surgical procedure, age and reduced mobility.I suggest  thromboembolic prophylaxis ( mechanical/pharmacological, weighing the risk benefits of pharmacological agent use considering jalen procedural bleeding )  during the perioperative period.I suggested being active in the post operative period.      Postoperative pulmonary complication prophylaxis-Risk factors for post operative pulmonary complications include ASA class >2 and tobacco use- I suggest tobacco smoking cessation, incentive spirometry use, early ambulation and pain control so as to avoid diaphragmatic splinting  Brush teeth twice per day, oral rinses, sleep with the head of the bed up 30 degrees     Renal complication prophylaxis . I suggest keeping him well hydrated and avoidance/ minimizing the use of  NSAID's,NIETO 2 Inhibitors ,IV contrast if possible in the perioperative period.I suggested drinking 2 litre's of water a day      Surgical site Infection Prophylaxis-I  suggest appropriate antibiotic for Prophylaxis against Surgical site infections Shower with Dial Antibacterial soap,  Hibiclens in the night before surgery and the morning of surgery     This visit was focused on Preoperative evaluation, Perioperative Medical management, complication reduction plans. I suggest that the patient follows up with primary care or relevant sub specialists for ongoing health care.    I appreciate the opportunity to be involved in this patients care. Please feel free to contact me if there were any questions about this consultation.

## 2022-04-26 NOTE — PROGRESS NOTES
"[facility]  Progress Note    Patient Name: Junior Garay  MRN: [id number]  Date of Evaluation- 05/17/2022  PCP- Mark Littlejohn MD    Future cases for Junior Garay [[id number]]     Case ID Status Date Time Marlon Procedure Provider Location    [id number] [facility] 6/2/2022  9:30  FUSION, SPINE, LUMBAR, TLIF, MINIMALLY INVASIVE L4-5, L5-S1 Humberto Rhodes MD [[id number]] NOM OR 2ND FLR          HPI:  This is a 47 y.o. male  who presents today for a preoperative evaluation in preparation for a Spine  procedure.  Scheduled for  6/2/22  Surgery is indicated for Fusion surgery with 6 screws and 2 plates in his spine; "fusion spine lumbar TLIF minimally invasive L4-5, L5-S1.   Patient is new to me.  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  Is clear evidence of annular tear at L4-5 and L5-S1.  Loss disc height.  There is right-sided foraminal narrowing bilaterally.  There is facet inflammation at both levels.     EMG with acute on chronic denervation in the left L5 and/or S1 myotomes suggestive of radiculopathy at those levels.      Discogram 2/22/22 reviewed. There was normal control at L2-3. L5-S1 and L4-5 had degenerative disc disease with annular tears and severe concordant pain with injections. These are positive discogram levels. L3-4 had posterior epidural tear and pain with injection, but this was atypical compared to his usual pain."    Reports symptoms of  pain, weakness right leg, difficulty moving, & falls  Aggravating factors include:   walking, lying down, prolonged sitting and standing, & unable to lift objects  Relieving factors are  lying on left side relieves the pain a little  Treated with Tylenol    Reports pain: " 10/10  The history has been obtained by speaking with the patient and reviewing the electronic medical record and/or outside health information. Significant health conditions for the perioperative period are discussed below in assessment and plan.     Patient reports current health status to be Good.  Denies any new symptoms before surgery.         Subjective/ Objective:     Chief Complaint: Preoperative evaulation, perioperative medical management, and complication reduction plan.     Functional Capacity: Able to to climb a flight of stairs, one step at a time- without CP SOB or pass out.  Cooks at home- gets help with the dishes       Anesthesia issues: None    Difficulty mouth opening: none    Steroid use in the last 12 months:  none    Dental Issues: none    Family anesthesia difficulty: None     Family Hx of Thrombosis none known    Past Medical History:   Diagnosis Date    Anxiety     Depression     related to current illness    Seizures     Had seizures as a child     Past Surgical History:   Procedure Laterality Date    ABDOMINAL SURGERY      APPENDECTOMY      BACK SURGERY      COLON SURGERY      TRANSFORAMINAL EPIDURAL INJECTION OF STEROID Right 07/12/2021    Procedure: LUMBAR TRANSFORAMINAL RIGHT L4/5 DIRECT REFERRAL NEEDS CONSENT;  Surgeon: Ramya Lundy MD;  Location: [facility];  Service: Pain Management;  Laterality: Right;       Review of Systems   Constitutional: Positive for fatigue. Negative for activity change, appetite change, chills, diaphoresis, fever and unexpected weight change.   HENT: Negative for congestion, dental problem, drooling, trouble swallowing and voice change.    Eyes: Negative for photophobia and visual disturbance.        No acute visual changes   Respiratory: Negative for apnea, cough, choking, chest tightness, shortness of breath, wheezing and stridor.         STOP bang  Score 2  Low risk AMNDO     Cardiovascular: Negative for chest pain, palpitations and leg  "swelling.   Gastrointestinal: Negative for abdominal pain, blood in stool, constipation, diarrhea, nausea and vomiting.        No FLD, Hepatitis, Cirrhosis  No BRB or black tarry stool   Genitourinary: Negative for difficulty urinating, dysuria, frequency, hematuria and urgency.        Nocturia 1 time per night   Musculoskeletal: Positive for arthralgias, back pain and gait problem. Negative for myalgias, neck pain and neck stiffness.   Neurological: Positive for weakness. Negative for dizziness, seizures, syncope, light-headedness, numbness and headaches.        Weakness in right leg and some shooting pains in back that travel down right leg   Psychiatric/Behavioral: Negative for suicidal ideas. The patient is not nervous/anxious.         Reports claustrophobia- was unable to tolerate MRI      VITALS  Visit Vitals  /83 (BP Location: Left arm, Patient Position: Sitting)   Pulse 83   Temp 98.4 °F (36.9 °C) (Oral)   Ht 5' 10" (1.778 m)   Wt 96.6 kg (213 lb)   SpO2 98%   BMI 30.56 kg/m²      Physical Exam  Constitutional:       General: He is not in acute distress.     Appearance: Normal appearance. He is well-developed. He is not diaphoretic.   HENT:      Head: Normocephalic.      Nose: Nose normal.      Mouth/Throat:      Mouth: Mucous membranes are moist.   Eyes:      General: Lids are normal.      Conjunctiva/sclera: Conjunctivae normal.      Pupils: Pupils are equal, round, and reactive to light.   Neck:      Vascular: No carotid bruit.      Trachea: Trachea and phonation normal.   Cardiovascular:      Rate and Rhythm: Normal rate and regular rhythm.      Pulses:           Carotid pulses are 2+ on the right side and 2+ on the left side.       Radial pulses are 2+ on the right side and 2+ on the left side.        Posterior tibial pulses are 2+ on the right side and 2+ on the left side.      Heart sounds: Normal heart sounds. No murmur heard.    No friction rub. No gallop.   Pulmonary:      Effort: Pulmonary " effort is normal.      Breath sounds: Normal breath sounds.   Abdominal:      General: Bowel sounds are normal. There is no distension.      Palpations: Abdomen is soft.      Tenderness: There is no abdominal tenderness.   Musculoskeletal:         General: No tenderness or deformity. Normal range of motion.      Cervical back: Normal range of motion.      Right lower leg: No edema.      Left lower leg: No edema.   Lymphadenopathy:      Head:      Right side of head: No submental, submandibular, tonsillar, preauricular, posterior auricular or occipital adenopathy.      Left side of head: No submental, submandibular, tonsillar, preauricular, posterior auricular or occipital adenopathy.      Cervical:      Right cervical: No superficial cervical adenopathy.     Left cervical: No superficial cervical adenopathy.   Skin:     General: Skin is warm and dry.      Capillary Refill: Capillary refill takes 2 to 3 seconds.      Coloration: Skin is not pale.      Findings: No erythema or rash.   Neurological:      Mental Status: He is alert and oriented to person, place, and time.      GCS: GCS eye subscore is 4. GCS verbal subscore is 5. GCS motor subscore is 6.      Motor: No abnormal muscle tone.      Coordination: Coordination normal.   Psychiatric:         Attention and Perception: Attention and perception normal.         Mood and Affect: Mood and affect normal.         Speech: Speech normal.         Behavior: Behavior normal. Behavior is cooperative.         Thought Content: Thought content normal.         Cognition and Memory: Cognition and memory normal.         Judgment: Judgment normal.          Significant Labs:  Lab Results   Component Value Date    WBC 4.07 05/16/2022    HGB 16.4 05/16/2022    HCT 49.3 05/16/2022     05/16/2022     04/26/2022    K 4.6 04/26/2022     04/26/2022    CREATININE 1.0 04/26/2022    BUN 15 04/26/2022    CO2 30 (H) 04/26/2022    INR 1.0 05/16/2022       Diagnostic Studies:  No relevant studies.    EKG:   No results found for this or any previous visit.    2D ECHO:  TTE:  No results found for this or any previous visit.    DAMARIS:  No results found for this or any previous visit.     Imaging     Active Cardiac Conditions: None      Revised Cardiac Risk Index   High -Risk Surgery  Intraperitoneal; Intrathoracic; suprainguinal vascular Yes- + 1 No- 0   History of Ischemic Heart Disease   (Hx of MI/positive exercise test/current chest pain due to ischemia/use of nitrate therapy/EKG with pathological Q waves) Yes- + 1 No- 0   History of CHF  (Pulmonary edema/bilateral rales or S3 gallop/PND/CXR showing pulmonary vascular redistribution) Yes- + 1 No- 0   History of CVA   (Prior stroke or TIA) Yes- + 1 No- 0   Pre-operative treatment with insulin Yes- + 1 No- 0   Pre-operative creatinine > 2mg/dl Yes- + 1 No- 0   Total:      Risk Status:  Estimated risk of cardiac complications after non-cardiac surgery using the Revised Cardiac Risk Index for Preoperative risk is 3.9 %      ARISCAT (Canet) risk index: Low: 1.6% risk of post-op pulmonary complications.    American Society of Anesthesiologists Physical Status classification (ASA): 2         No further cardiac workup needed prior to surgery.        Preoperative cardiac risk assessment-  The patient does not have any active cardiac conditions . Revised cardiac risk index predictors- ---.Functional capacity is more than 4 Mets. He will be undergoing a Spine procedure that carries a Moderate Risk risk     The estimated risk of the rate of adverse cardiac outcomes  3.9%    No further cardiac work up is indicated prior to proceeding with the surgery          American Society of Anesthesiologists Physical status classification ( ASA ) class: 2     Postoperative pulmonary complication risk assessment: 1.6     ARISCAT ( Canet) risk index- risk class -  Low, if duration of surgery is under 3 hours, intermediate, if duration of surgery is over 3 hours           Assessment/Plan:     HTN (hypertension)  Blood pressure consistently greater than 120//80 in medical record    Currently not on any BP medications  Today's /83  States he stopped smoking cigarettes which may be allowing his BP to normalize    Encourage home blood pressure checks twice per week to assess if he is over the goal of 120/80 or less      History of seizures  Reports he has a history as a child  He says he cannot remember the last seizure he had except that it was when he was a child- states he did not have any since starting elementary school    Tobacco use  Stopped smoking 2 weeks ago    Instructed to not smoke the day of surgery per anesthesia protocol- verbalized  understanding      Depression  Strategies were recommended to help with depression per Psychiatry:  Psychotherapy for chronic pain, consider starting antidepressant, consider functional restoration program'  Patient given telephone numbers to schedule visits    Patient reports no Suicidal ideation at this time but does express frustration with current spine diagnosis  He has family support at home including a niece and sister that help him with household chores      DDD (degenerative disc disease), lumbar  See HPI        Preventive perioperative care    Thromboembolic prophylaxis:  His risk factors for thrombosis include obesity, surgical procedure, age and reduced mobility.I suggest  thromboembolic prophylaxis ( mechanical/pharmacological, weighing the risk benefits of pharmacological agent use considering jalen procedural bleeding )  during the perioperative period.I suggested being active in the post operative period.      Postoperative pulmonary complication prophylaxis-Risk factors for post operative pulmonary complications include ASA class >2 and tobacco use- I suggest tobacco smoking cessation, incentive spirometry use, early ambulation and pain control so as to avoid diaphragmatic splinting  Brush teeth twice per day, oral  rinses, sleep with the head of the bed up 30 degrees     Renal complication prophylaxis . I suggest keeping him well hydrated and avoidance/ minimizing the use of  NSAID's,NIETO 2 Inhibitors ,IV contrast if possible in the perioperative period.I suggested drinking 2 litre's of water a day      Surgical site Infection Prophylaxis-I  suggest appropriate antibiotic for Prophylaxis against Surgical site infections Shower with Dial Antibacterial soap,  Hibiclens in the night before surgery and the morning of surgery     This visit was focused on Preoperative evaluation, Perioperative Medical management, complication reduction plans. I suggest that the patient follows up with primary care or relevant sub specialists for ongoing health care.    I appreciate the opportunity to be involved in this patients care. Please feel free to contact me if there were any questions about this consultation.    Patient is optimized    Labs- need to be repeated due to change in surgery date- done    Sumeet Madden NP  Perioperative Medicine  [facility]   Pager [phone]

## 2022-04-26 NOTE — OUTPATIENT SUBJECTIVE & OBJECTIVE
Outpatient Subjective & Objective      Chief Complaint: Preoperative evaulation, perioperative medical management, and complication reduction plan.     Functional Capacity: Able to to climb a flight of stairs, one step at a time- without CP SOB or pass out.  Cooks at home- gets help with the dishes       Anesthesia issues: None    Difficulty mouth opening: none    Steroid use in the last 12 months:  none    Dental Issues: none    Family anesthesia difficulty: None     Family Hx of Thrombosis none known    Past Medical History:   Diagnosis Date    Anxiety     Depression     related to current illness    Seizures     Had seizures as a child     Past Surgical History:   Procedure Laterality Date    ABDOMINAL SURGERY      APPENDECTOMY      BACK SURGERY      COLON SURGERY      TRANSFORAMINAL EPIDURAL INJECTION OF STEROID Right 07/12/2021    Procedure: LUMBAR TRANSFORAMINAL RIGHT L4/5 DIRECT REFERRAL NEEDS CONSENT;  Surgeon: Ramya Lundy MD;  Location: [facility];  Service: Pain Management;  Laterality: Right;       Review of Systems   Constitutional: Positive for fatigue. Negative for activity change, appetite change, chills, diaphoresis, fever and unexpected weight change.   HENT: Negative for congestion, dental problem, drooling, trouble swallowing and voice change.    Eyes: Negative for photophobia and visual disturbance.        No acute visual changes   Respiratory: Negative for apnea, cough, choking, chest tightness, shortness of breath, wheezing and stridor.         STOP bang  Score 2  Low risk MANDO     Cardiovascular: Negative for chest pain, palpitations and leg swelling.   Gastrointestinal: Negative for abdominal pain, blood in stool, constipation, diarrhea, nausea and vomiting.        No FLD, Hepatitis, Cirrhosis  No BRB or black tarry stool   Genitourinary: Negative for difficulty urinating, dysuria, frequency, hematuria and urgency.        Nocturia 1 time per night   Musculoskeletal: Positive for  "arthralgias, back pain and gait problem. Negative for myalgias, neck pain and neck stiffness.   Neurological: Positive for weakness. Negative for dizziness, seizures, syncope, light-headedness, numbness and headaches.        Weakness in right leg and some shooting pains in back that travel down right leg   Psychiatric/Behavioral: Negative for suicidal ideas. The patient is not nervous/anxious.         Reports claustrophobia- was unable to tolerate MRI      VITALS  Visit Vitals  /83 (BP Location: Left arm, Patient Position: Sitting)   Pulse 83   Temp 98.4 °F (36.9 °C) (Oral)   Ht 5' 10" (1.778 m)   Wt 96.6 kg (213 lb)   SpO2 98%   BMI 30.56 kg/m²      Physical Exam  Constitutional:       General: He is not in acute distress.     Appearance: Normal appearance. He is well-developed. He is not diaphoretic.   HENT:      Head: Normocephalic.      Nose: Nose normal.      Mouth/Throat:      Mouth: Mucous membranes are moist.   Eyes:      General: Lids are normal.      Conjunctiva/sclera: Conjunctivae normal.      Pupils: Pupils are equal, round, and reactive to light.   Neck:      Vascular: No carotid bruit.      Trachea: Trachea and phonation normal.   Cardiovascular:      Rate and Rhythm: Normal rate and regular rhythm.      Pulses:           Carotid pulses are 2+ on the right side and 2+ on the left side.       Radial pulses are 2+ on the right side and 2+ on the left side.        Posterior tibial pulses are 2+ on the right side and 2+ on the left side.      Heart sounds: Normal heart sounds. No murmur heard.    No friction rub. No gallop.   Pulmonary:      Effort: Pulmonary effort is normal.      Breath sounds: Normal breath sounds.   Abdominal:      General: Bowel sounds are normal. There is no distension.      Palpations: Abdomen is soft.      Tenderness: There is no abdominal tenderness.   Musculoskeletal:         General: No tenderness or deformity. Normal range of motion.      Cervical back: Normal range of " motion.      Right lower leg: No edema.      Left lower leg: No edema.   Lymphadenopathy:      Head:      Right side of head: No submental, submandibular, tonsillar, preauricular, posterior auricular or occipital adenopathy.      Left side of head: No submental, submandibular, tonsillar, preauricular, posterior auricular or occipital adenopathy.      Cervical:      Right cervical: No superficial cervical adenopathy.     Left cervical: No superficial cervical adenopathy.   Skin:     General: Skin is warm and dry.      Capillary Refill: Capillary refill takes 2 to 3 seconds.      Coloration: Skin is not pale.      Findings: No erythema or rash.   Neurological:      Mental Status: He is alert and oriented to person, place, and time.      GCS: GCS eye subscore is 4. GCS verbal subscore is 5. GCS motor subscore is 6.      Motor: No abnormal muscle tone.      Coordination: Coordination normal.   Psychiatric:         Attention and Perception: Attention and perception normal.         Mood and Affect: Mood and affect normal.         Speech: Speech normal.         Behavior: Behavior normal. Behavior is cooperative.         Thought Content: Thought content normal.         Cognition and Memory: Cognition and memory normal.         Judgment: Judgment normal.          Significant Labs:  Lab Results   Component Value Date    WBC 4.07 05/16/2022    HGB 16.4 05/16/2022    HCT 49.3 05/16/2022     05/16/2022     04/26/2022    K 4.6 04/26/2022     04/26/2022    CREATININE 1.0 04/26/2022    BUN 15 04/26/2022    CO2 30 (H) 04/26/2022    INR 1.0 05/16/2022       Diagnostic Studies: No relevant studies.    EKG:   No results found for this or any previous visit.    2D ECHO:  TTE:  No results found for this or any previous visit.    DAMARIS:  No results found for this or any previous visit.     Imaging     Active Cardiac Conditions: None      Revised Cardiac Risk Index   High -Risk Surgery  Intraperitoneal; Intrathoracic;  suprainguinal vascular Yes- + 1 No- 0   History of Ischemic Heart Disease   (Hx of MI/positive exercise test/current chest pain due to ischemia/use of nitrate therapy/EKG with pathological Q waves) Yes- + 1 No- 0   History of CHF  (Pulmonary edema/bilateral rales or S3 gallop/PND/CXR showing pulmonary vascular redistribution) Yes- + 1 No- 0   History of CVA   (Prior stroke or TIA) Yes- + 1 No- 0   Pre-operative treatment with insulin Yes- + 1 No- 0   Pre-operative creatinine > 2mg/dl Yes- + 1 No- 0   Total:      Risk Status:  Estimated risk of cardiac complications after non-cardiac surgery using the Revised Cardiac Risk Index for Preoperative risk is 3.9 %      ARISCAT (Canet) risk index: Low: 1.6% risk of post-op pulmonary complications.    American Society of Anesthesiologists Physical Status classification (ASA): 2         No further cardiac workup needed prior to surgery.    Outpatient Subjective & Objective

## 2022-04-26 NOTE — ASSESSMENT & PLAN NOTE
Strategies were recommended to help with depression per Psychiatry:  Psychotherapy for chronic pain, consider starting antidepressant, consider functional restoration program'  Patient given telephone numbers to schedule visits    Patient reports no Suicidal ideation at this time but does express frustration with current spine diagnosis  He has family support at home including a niece and sister that help him with household chores

## 2022-04-26 NOTE — ASSESSMENT & PLAN NOTE
Stopped smoking 2 weeks ago    Instructed to not smoke the day of surgery per anesthesia protocol- verbalized  understanding

## 2022-04-26 NOTE — ASSESSMENT & PLAN NOTE
Reports he has a history as a child  He says he cannot remember the last seizure he had except that it was when he was a child- states he did not have any since starting elementary school

## 2022-04-26 NOTE — HPI
"This is a 47 y.o. male  who presents today for a preoperative evaluation in preparation for a Spine  procedure.  Scheduled for  6/2/22  Surgery is indicated for Fusion surgery with 6 screws and 2 plates in his spine; "fusion spine lumbar TLIF minimally invasive L4-5, L5-S1.   Patient is new to me.  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  Is clear evidence of annular tear at L4-5 and L5-S1.  Loss disc height.  There is right-sided foraminal narrowing bilaterally.  There is facet inflammation at both levels.     EMG with acute on chronic denervation in the left L5 and/or S1 myotomes suggestive of radiculopathy at those levels.      Discogram 2/22/22 reviewed. There was normal control at L2-3. L5-S1 and L4-5 had degenerative disc disease with annular tears and severe concordant pain with injections. These are positive discogram levels. L3-4 had posterior epidural tear and pain with injection, but this was atypical compared to his usual pain."    Reports symptoms of  pain, weakness right leg, difficulty moving, & falls  Aggravating factors include:   walking, lying down, prolonged sitting and standing, & unable to lift objects  Relieving factors are  lying on left side relieves the pain a little  Treated with Tylenol    Reports pain: 10/10  The history has been obtained by speaking with the patient and reviewing the electronic medical record and/or outside health information. Significant health conditions for the perioperative period are discussed below in assessment and plan.     Patient reports current health status to be Good.  Denies any new symptoms before surgery.   "

## 2022-04-27 DIAGNOSIS — Z01.818 PREOPERATIVE TESTING: Primary | ICD-10-CM

## 2022-04-28 ENCOUNTER — PATIENT MESSAGE (OUTPATIENT)
Dept: PSYCHIATRY | Facility: CLINIC | Age: 47
End: 2022-04-28
Payer: COMMERCIAL

## 2022-04-29 ENCOUNTER — TELEPHONE (OUTPATIENT)
Dept: PREADMISSION TESTING | Facility: HOSPITAL | Age: 47
End: 2022-04-29
Payer: COMMERCIAL

## 2022-04-29 NOTE — TELEPHONE ENCOUNTER
----- Message from Casie Henry RN sent at 4/27/2022  8:42 AM CDT -----  SURGERY RESCHEDULED 6/2  Please schedule labs.  Thanks!

## 2022-05-05 ENCOUNTER — OFFICE VISIT (OUTPATIENT)
Dept: PAIN MEDICINE | Facility: OTHER | Age: 47
End: 2022-05-05
Payer: MEDICAID

## 2022-05-05 ENCOUNTER — TELEPHONE (OUTPATIENT)
Dept: PREADMISSION TESTING | Facility: HOSPITAL | Age: 47
End: 2022-05-05
Payer: COMMERCIAL

## 2022-05-05 DIAGNOSIS — M54.17 LUMBOSACRAL RADICULOPATHY: Primary | ICD-10-CM

## 2022-05-05 DIAGNOSIS — G89.4 CHRONIC PAIN SYNDROME: ICD-10-CM

## 2022-05-05 DIAGNOSIS — M51.37 DDD (DEGENERATIVE DISC DISEASE), LUMBOSACRAL: ICD-10-CM

## 2022-05-05 PROCEDURE — 99205 PR OFFICE/OUTPT VISIT, NEW, LEVL V, 60-74 MIN: ICD-10-PCS | Mod: S$PBB,,, | Performed by: NURSE PRACTITIONER

## 2022-05-05 PROCEDURE — 99205 OFFICE O/P NEW HI 60 MIN: CPT | Mod: S$PBB,,, | Performed by: NURSE PRACTITIONER

## 2022-05-05 NOTE — PROGRESS NOTES
Functional Restoration Program    Initial Medical Screening Visit Note    Subjective:       Chief Complaint Requiring Rehabilitation: Chronic Pain    Consulted by: Dr. Bowling (Psychiatry)    HPI:    Junior Garay is a 47 y.o. male who presents today for the Functional Restoration Program Medical Screening Visit. Junior Garay was seen by Psychology for a pre-op evaluation prior to his scheduled lumbar spinal fusion surgery. He has medical dx of lumbar DDD and dorsalgia (RLE).     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 Was changing a hot water heater and lifted it and heard a pop. Says my whole body just gave out and he fell. Now he says I lay down and stay in the house all day. He used to get up at 3 am and go to work and now he can't do that much. He has pain in his right lower back and leg. No paresthesias. He is weak and uses a cane. The pain is constant. The pain is worse with sleeping on that side, walking a lot, standing too long, sitting too long in one spot. To help with pain he takes a hot shower, rests. Says I lay down all day. The only time he is really up is when he tries to do some cooking and he puts a chair by the stove.       To support history I reviewed the following info from 3/23/22 Neurosurgery clinic note:  HPI:  This is a 46-year-old male who is a worker's comp patient who we have been following for some time now.  Patient had a history of of hurting his back lifting a hot water heater in 2020. He has been experiencing significant intractable back pain and radiculopathy and has failed physical therapy, gabapentin, Medrol Dosepak, pain medication as well as epidural and transforaminal injections.  Unfortunately nothing has really helped.  His pain continues to get worse and now impairing his daily living and impacting his ability to  return to work.  His EMG does show active denervation of L5 and S1. He was scheduled for lumbar TLIF after his last visit but this was denied. He was referred for a second opinion through worker's comp as they did not feel he qualified for surgery at that time. The physician recommended that he obtain a discogram for further evaluation of his pain. His discogram was positive at L4-5, L5-S1 for DDD and severe concordant pain with injections at these levels. There was a normal control at L2-3. He denies any change in his back pain since his last visit. He continues with lateral right leg pain and weakness in his left foot. He denies new bowel or bladder incontinence.  Plan:  46-year-old male with significant and ongoing lower back pain with right-sided radiculopathy.  MRI lumabr spine with extensive changes at L4-5 and his symptoms really began after the injury itself.  He has failed physical therapy, pain medications and injections.  I do not agree that a CT myelogram is needed as we have an adequate MRI lumbar spine. Lumbar discogram was positive for discogenic pain at L4-5, L5-S1 with a normal control at L2-3.  At this stage, we have exhausted all conservative measures and it is recommended that surgical intervention is warranted. After discussion with the pt and Dr. Rhodes, we have offered L4-5 TLIF with L5-S1 posterior fixation. R/B/A were reviewed with the patient and all of his questions were answered. Informed consent was obtained. We will need to obtain a repeat CT lumbar spine for surgical planning. I will also obtain xrays of his right knee and refer him to ortho for evaluation of this prior to surgery.      Junior Garay also has a PMH of Past Medical History:  No date: Anxiety  No date: Depression      Comment:  related to current illness  No date: Seizures      Comment:  Had seizures as a child .     1. Ambulatory status:  Walks with a cane.      2. Balance  problems?  yes      3. Fainting/Syncope/POTS?  None       4. Physical Therapy?  Yes- for two weeks only       5. Exercise Habits:  None        6. Alternative/Complementary Therapies (massage, yoga, annie chi, acupuncture, guided imagery, chiropractic care, hypnosis, biofeedback, herbs, supplements, dietary approaches)?  None       7. Current pain medications:  None     8. Pain management injections:  Yes- did not help       9. Relevant surgeries:  2006- lumbar surgery- helped       10. Working/Employed?  Long term disability       11. Sleep:  Pain disturbs sleep. Sleeps off and on. Some days gets a good nights rest. Sometimes stays up and watches tv when he has a lot of pain. No hx MANDO.        12. Mental Health Hx/Tx & Stress/Stress Mgmt:  None.       Inpatient Psychiatric Tx? No     SI? No       13. Social Hx/Connections:   Lives in [place]. 7 children. Is engaged. Has 5 granddaughters. Lives with sammy.       14. Health Habits:      Smoking Status: quit smoking 2 months ago       Alcohol use: socially      Illegal/illicit drug use: none      Substance abuse hx?: none       Past Medical History:   Diagnosis Date    Anxiety     Depression     related to current illness    Seizures     Had seizures as a child       Past Surgical History:   Procedure Laterality Date    ABDOMINAL SURGERY      APPENDECTOMY      BACK SURGERY      COLON SURGERY      TRANSFORAMINAL EPIDURAL INJECTION OF STEROID Right 07/12/2021    Procedure: LUMBAR TRANSFORAMINAL RIGHT L4/5 DIRECT REFERRAL NEEDS CONSENT;  Surgeon: Ramya Lundy MD;  Location: [facility];  Service: Pain Management;  Laterality: Right;       Review of patient's allergies indicates:  No Known Allergies    Current Outpatient Medications   Medication Sig Dispense Refill    acetaminophen (TYLENOL) 500 MG tablet Take 1 tablet (500 mg total) by mouth 2 (two) times a day. (Patient taking differently: Take by mouth 2 (two) times daily as needed.) 60 tablet 0     No current  facility-administered medications for this visit.       Family History   Problem Relation Age of Onset    Cancer Mother     Heart attack Mother     No Known Problems Father        Social History     Socioeconomic History    Marital status:     Number of children: 7   Tobacco Use    Smoking status: Current Every Day Smoker     Packs/day: 0.50     Years: 10.00     Pack years: 5.00     Types: Cigarettes    Smokeless tobacco: Never Used    Tobacco comment: Stopped 2 weeks ago   Substance and Sexual Activity    Alcohol use: No     Comment: socially- wedding or party    Drug use: No   Social History Narrative    Stairs- none              Objective:        GEN: Well developed, well nourished. No acute distress. Fully alert, oriented, and appropriate. Speech normal hayley.   PSYCH: Normal affect. Thought content appropriate.  CHEST: Breathing symmetric. No audible wheezing.  SKIN: Warm, dry. No rash or discoloration on exposed areas.   NEURO/MUSCULOSKELETAL:  Lumbar: ROM limited and painful. TTP lumbar musculature; decreased strength and sensation RLE compared to left.   SLR negative bilaterally (sitting)             Imaging:    CT LUMBAR SPINE WITHOUT CONTRAST     CLINICAL HISTORY:  Low back pain, progressive neurologic deficit;  Dorsalgia, unspecified     TECHNIQUE:  Low-dose axial images are obtained through the lumbar spine according to globus protocol.  Contrast was not administered.     COMPARISON:  CT lumbar spine 07/09/2021, discogram 02/22/2022     FINDINGS:  Alignment: Normal.     Vertebrae: No fracture. No lytic or blastic lesion.     Sacroiliac joints: Normal.     Degenerative findings:     Limited assessment with CT.  There is degenerative disc disease at L4-5, noting disc height loss with endplate changes.  The spinal canal and neural foramen are grossly patent.     Paraspinal muscles & soft tissues: Grossly unremarkable.     Impression:     CT examination performed for preoperative  planning.  No acute findings.     Electronically signed by resident: Macy Ta  Date:                                            04/01/2022  Time:                                           16:18     Electronically signed by: Andi Russo MD  Date:                                            04/01/2022    Exam: Lumbar discogram     CPT codes: 91738, 83042 (both codes 4 times)     History:  The patient is a 48 y/o M with lumbar pain suspicious for discogenic pain.  Patient was referred by the neuro surgical department for diskography.  This is a provocative examination intended to determine the level of the patient's discogenic pain.     Technique: Informed consent was obtained.  Patient was placed prone on the biplane angiography table.  Conscious intravenous sedation was given, administered by an independent registered radiology nurse for a total of 9 minutes. Patient was only mildly sedated, and the patient was able to answer questions and a coherent fashion throughout the examination.     The procedure was performed from a left posterior lateral approach.  Using sterile technique and lidocaine local anesthesia, 18-gauge needles were advanced to the margins of the L5 - S1, L4 - 5 and L3 - 4 disk spaces.  Curved 22-gauge Chiba needles were then used to enter the disk at each of these levels.  Contrast was injected with biplane fluoroscopy, and the patient's symptoms were recorded, including the type and severity of pain, whether the pain was concordant to usual symptoms, and the intensity of pain on a zero to 10 scale.  X-rays were recorded in multiple projections at each disk level.     L2 - 3 level in order to find a control disc to validate the other results.     There were no complications of the procedure.  Total contrast dose was  cc Omnipaque 180.  The radiation dose reference air kerma was 70 mGy.  Fluoro time was 3.1 minutes.     Findings: The L5 -- S1 level was injected with 2 cc of contrast with a  firm endpoint.  Patient described severe pain that was concordant with his usual symptoms, rated 10/10.  Radiographically, the disc had a posterior epidural tear with contrast extending to the epidural space and tracking in the ventral epidural compartment.     The L4 -- 5 level was injected with 2 cc of contrast with a firm endpoint.  Patient described severe pain that was concordant with his usual symptoms rated 10/10.  Radiographically, the disc space was narrowed.  There is contrast tracking anteriorly through annular tears and into a Schmorl's node.  Contrast tracks posteriorly more on the left side and it ascends and descends in the ventral epidural space.     The L3 -- 4 level was injected with 2.5 cc of contrast with a firm endpoint.  Patient  described 9/10 pain that was severe, but it was not typical of his usual symptoms.  Radiographically, there are posterior epidural tears with contrast extending into fibers of bulging disc in the ventral epidural space.     The L2 -- 3 level was injected with 1.5 cc of contrast with a firm endpoint. Patient  described no pain or sensation with the injection.  Radiographically, the disc is normal in appearance.     Images and report were permanently recorded.     Impression:  Impression:     1.  L5-S1 and L4-5 had degenerative disc disease with annular tears and severe concordant pain with injections.  These are positive discogram levels.     2.  L3-4 had posterior epidural tear and pain with injection, but this was atypical compared to his usual pain.     3.  L2-3 normal control diskogram        Electronically signed by: Ted Botello MD  Date:                                            02/22/2022  Time:                                           11:44  Assessment:     Encounter Diagnoses   Name Primary?    Lumbosacral radiculopathy Yes    DDD (degenerative disc disease), lumbosacral     Chronic pain syndrome        Plan:     Diagnoses and all orders for this  visit:    Lumbosacral radiculopathy    DDD (degenerative disc disease), lumbosacral    Chronic pain syndrome  -     Ambulatory referral/consult to Psychology; Future         Junior Garay is a 47 y.o. male with  Chronic back and RLE pain since a work accident in 2020. He has failed to benefit from PT (went for 2 weeks but could not tolerate and PT recommended further eval/imaging), medications, injections. Surgical lumbar fusion has been recommended and he is scheduled for 6/2/22.     Education about pain and the chronic pain cycle was provided today. Discussed the importance of multimodal and multidisciplinary management of chronic pain with a focus on both pain relief and function. Discussed how our team provides education and training aimed at improving physical function, emotional health, stress and pain coping skills.Treatment is designed to build confidence in physical activity and ADLs and in your ability to control and manage your pain.     Junior Garay is scheduled for surgery. Discussed the pain cycle and how it will be important after surgery (when cleared) to participate in PT to address functional deficits. Also discussed the emotional impact of pain and how therapy can help with this. He is open to this. Will refer to our LCSW for 1:1 CBT for chronic pain. I have also discussed that I will be offering the [employer] class to learn about pain science and pain self-management, which will be beneficial for him to participate in prior to surgery if possible. I will contact him with more information about this.     I spent a total of 60 minutes with the patient, and greater than 50% of the time was spent in counseling and education.     The above plan and management options were discussed at length with patient. Patient is in agreement with the above and verbalized understanding. It will be communicated with the referring physician via electronic record, fax, or mail.

## 2022-05-16 ENCOUNTER — LAB VISIT (OUTPATIENT)
Dept: LAB | Facility: HOSPITAL | Age: 47
End: 2022-05-16
Attending: ANESTHESIOLOGY
Payer: MEDICAID

## 2022-05-16 ENCOUNTER — OFFICE VISIT (OUTPATIENT)
Dept: INTERNAL MEDICINE | Facility: CLINIC | Age: 47
End: 2022-05-16
Payer: MEDICAID

## 2022-05-16 VITALS
HEIGHT: 70 IN | SYSTOLIC BLOOD PRESSURE: 120 MMHG | HEART RATE: 99 BPM | WEIGHT: 213 LBS | BODY MASS INDEX: 30.49 KG/M2 | OXYGEN SATURATION: 99 % | DIASTOLIC BLOOD PRESSURE: 90 MMHG | RESPIRATION RATE: 16 BRPM

## 2022-05-16 DIAGNOSIS — F43.21 ADJUSTMENT DISORDER WITH DEPRESSED MOOD: Primary | ICD-10-CM

## 2022-05-16 DIAGNOSIS — Z01.818 PREOPERATIVE TESTING: ICD-10-CM

## 2022-05-16 DIAGNOSIS — M51.36 DDD (DEGENERATIVE DISC DISEASE), LUMBAR: ICD-10-CM

## 2022-05-16 DIAGNOSIS — R03.0 ELEVATED BP WITHOUT DIAGNOSIS OF HYPERTENSION: ICD-10-CM

## 2022-05-16 LAB
BASOPHILS # BLD AUTO: 0.03 K/UL (ref 0–0.2)
BASOPHILS NFR BLD: 0.7 % (ref 0–1.9)
DIFFERENTIAL METHOD: ABNORMAL
EOSINOPHIL # BLD AUTO: 0.3 K/UL (ref 0–0.5)
EOSINOPHIL NFR BLD: 7.6 % (ref 0–8)
ERYTHROCYTE [DISTWIDTH] IN BLOOD BY AUTOMATED COUNT: 13.5 % (ref 11.5–14.5)
HCT VFR BLD AUTO: 49.3 % (ref 40–54)
HGB BLD-MCNC: 16.4 G/DL (ref 14–18)
IMM GRANULOCYTES # BLD AUTO: 0 K/UL (ref 0–0.04)
IMM GRANULOCYTES NFR BLD AUTO: 0 % (ref 0–0.5)
INR PPP: 1 (ref 0.8–1.2)
LYMPHOCYTES # BLD AUTO: 1.6 K/UL (ref 1–4.8)
LYMPHOCYTES NFR BLD: 38.3 % (ref 18–48)
MCH RBC QN AUTO: 31 PG (ref 27–31)
MCHC RBC AUTO-ENTMCNC: 33.3 G/DL (ref 32–36)
MCV RBC AUTO: 93 FL (ref 82–98)
MONOCYTES # BLD AUTO: 0.4 K/UL (ref 0.3–1)
MONOCYTES NFR BLD: 10.6 % (ref 4–15)
NEUTROPHILS # BLD AUTO: 1.7 K/UL (ref 1.8–7.7)
NEUTROPHILS NFR BLD: 42.8 % (ref 38–73)
NRBC BLD-RTO: 0 /100 WBC
PLATELET # BLD AUTO: 314 K/UL (ref 150–450)
PMV BLD AUTO: 9.8 FL (ref 9.2–12.9)
PROTHROMBIN TIME: 9.9 SEC (ref 9–12.5)
RBC # BLD AUTO: 5.29 M/UL (ref 4.6–6.2)
WBC # BLD AUTO: 4.07 K/UL (ref 3.9–12.7)

## 2022-05-16 PROCEDURE — 99214 PR OFFICE/OUTPT VISIT, EST, LEVL IV, 30-39 MIN: ICD-10-PCS | Mod: S$PBB,,, | Performed by: PHYSICIAN ASSISTANT

## 2022-05-16 PROCEDURE — 99214 OFFICE O/P EST MOD 30 MIN: CPT | Mod: S$PBB,,, | Performed by: PHYSICIAN ASSISTANT

## 2022-05-16 PROCEDURE — 85025 COMPLETE CBC W/AUTO DIFF WBC: CPT | Performed by: ANESTHESIOLOGY

## 2022-05-16 PROCEDURE — 99999 PR PBB SHADOW E&M-EST. PATIENT-LVL IV: ICD-10-PCS | Mod: PBBFAC,,, | Performed by: PHYSICIAN ASSISTANT

## 2022-05-16 PROCEDURE — 99214 OFFICE O/P EST MOD 30 MIN: CPT | Mod: PBBFAC | Performed by: PHYSICIAN ASSISTANT

## 2022-05-16 PROCEDURE — 36415 COLL VENOUS BLD VENIPUNCTURE: CPT | Performed by: ANESTHESIOLOGY

## 2022-05-16 PROCEDURE — 99999 PR PBB SHADOW E&M-EST. PATIENT-LVL IV: CPT | Mod: PBBFAC,,, | Performed by: PHYSICIAN ASSISTANT

## 2022-05-16 PROCEDURE — 85610 PROTHROMBIN TIME: CPT | Performed by: ANESTHESIOLOGY

## 2022-05-16 RX ORDER — DULOXETIN HYDROCHLORIDE 30 MG/1
30 CAPSULE, DELAYED RELEASE ORAL DAILY
Qty: 30 CAPSULE | Refills: 1 | Status: SHIPPED | OUTPATIENT
Start: 2022-05-16 | End: 2022-07-12

## 2022-05-16 NOTE — PROGRESS NOTES
Subjective:       Patient ID: Junior Garay is a 47 y.o. male.    Chief Complaint: Depression      Established pt of Mark Littlejohn MD (new to me)    HPI           New pt here.     States he was referred here about medications for depression. He notes its been a difficult time affer his on the job back injury. Feels sad/down about not being able to do things he used to enjoy. He is established with NSGY, pain clnic and recently started our functional restorative program. He completed a evaluation with psychologist, Dr. Bowling last month, pharmacotherapy was recommended No SI/HI    His prior PCP has retired and he is looking to establish a new PCP.         Past Medical History:   Diagnosis Date    Anxiety     Depression     related to current illness    Seizures     Had seizures as a child       Social History     Tobacco Use    Smoking status: Former Smoker     Packs/day: 0.50     Years: 10.00     Pack years: 5.00     Types: Cigarettes    Smokeless tobacco: Never Used    Tobacco comment: Stopped 2 weeks ago   Substance Use Topics    Alcohol use: No     Comment: socially- wedding or party    Drug use: No       Review of patient's allergies indicates:  No Known Allergies      Current Outpatient Medications:     acetaminophen (TYLENOL) 500 MG tablet, Take 1 tablet (500 mg total) by mouth 2 (two) times a day. (Patient not taking: Reported on 5/16/2022), Disp: 60 tablet, Rfl: 0    Family History   Problem Relation Age of Onset    Cancer Mother     Heart attack Mother     No Known Problems Father        Past Surgical History:   Procedure Laterality Date    ABDOMINAL SURGERY      APPENDECTOMY      BACK SURGERY      COLON SURGERY      TRANSFORAMINAL EPIDURAL INJECTION OF STEROID Right 07/12/2021    Procedure: LUMBAR TRANSFORAMINAL RIGHT L4/5 DIRECT REFERRAL NEEDS CONSENT;  Surgeon: Ramya Lundy MD;  Location: [facility]IN [facility];  Service: Pain Management;  Laterality: Right;       Review of Systems  "  Constitutional: Negative for chills, fatigue and fever.   Cardiovascular: Negative for chest pain and leg swelling.   Gastrointestinal: Negative for nausea and vomiting.   Musculoskeletal: Positive for back pain.   Integumentary:  Negative for rash.   Neurological: Negative for dizziness and headaches.   Psychiatric/Behavioral: Positive for dysphoric mood and sleep disturbance. Negative for suicidal ideas.       Objective:  BP (!) 120/100 (BP Location: Left arm, Patient Position: Sitting, BP Method: Large (Manual))   Pulse 99   Resp 16   Ht 5' 10" (1.778 m)   Wt 96.6 kg (213 lb)   SpO2 99%   BMI 30.56 kg/m²         Physical Exam  Constitutional:       General: He is not in acute distress.     Appearance: He is not ill-appearing.   Cardiovascular:      Rate and Rhythm: Normal rate and regular rhythm.      Heart sounds: No murmur heard.  Pulmonary:      Effort: Pulmonary effort is normal. No respiratory distress.      Breath sounds: No wheezing.   Musculoskeletal:      Right lower leg: No edema.      Left lower leg: No edema.      Comments: Ambulating with cane   Skin:     General: Skin is warm and dry.      Findings: No rash.   Neurological:      Mental Status: He is alert.   Psychiatric:         Attention and Perception: Attention normal.         Mood and Affect: Mood is depressed.         Speech: Speech normal.         Behavior: Behavior normal. Behavior is cooperative.         Thought Content: Thought content normal. Thought content does not include homicidal or suicidal ideation.         Assessment:       1. Adjustment disorder with depressed mood        Plan:             Junior was seen today for depression.    Diagnoses and all orders for this visit:    Adjustment disorder with depressed mood  -     DULoxetine (CYMBALTA) 30 MG capsule; Take 1 capsule (30 mg total) by mouth once daily.    DDD (degenerative disc disease), lumbar (continue f/u with specialist)  -     DULoxetine (CYMBALTA) 30 MG capsule; " Take 1 capsule (30 mg total) by mouth once daily.    Elevated BP without diagnosis of hypertension(BP goals discussed, lifestyle mods reviewed, advised to keep log and bring on RTC)      Discussed starting Cymbalta, side effect profile, as well as time to notice effects  RTC in 4 weeks for follow up  Discussed need to choose MD as PCP to fully establish care with our practice site (list provided)      Apolonia Rodrigues PA-C

## 2022-05-24 ENCOUNTER — ANESTHESIA EVENT (OUTPATIENT)
Dept: SURGERY | Facility: HOSPITAL | Age: 47
DRG: 454 | End: 2022-05-24
Payer: MEDICAID

## 2022-05-25 ENCOUNTER — PATIENT MESSAGE (OUTPATIENT)
Dept: NEUROSURGERY | Facility: CLINIC | Age: 47
End: 2022-05-25
Payer: COMMERCIAL

## 2022-05-27 ENCOUNTER — OFFICE VISIT (OUTPATIENT)
Dept: PSYCHIATRY | Facility: OTHER | Age: 47
End: 2022-05-27
Payer: COMMERCIAL

## 2022-05-27 DIAGNOSIS — F43.21 ADJUSTMENT DISORDER WITH DEPRESSED MOOD: Primary | ICD-10-CM

## 2022-05-27 DIAGNOSIS — G89.4 CHRONIC PAIN SYNDROME: ICD-10-CM

## 2022-05-27 PROCEDURE — 90791 PSYCH DIAGNOSTIC EVALUATION: CPT | Mod: 95,,, | Performed by: SOCIAL WORKER

## 2022-05-27 PROCEDURE — 90791 PR PSYCHIATRIC DIAGNOSTIC EVALUATION: ICD-10-PCS | Mod: 95,,, | Performed by: SOCIAL WORKER
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
## 2022-06-01 ENCOUNTER — PATIENT MESSAGE (OUTPATIENT)
Dept: ADMINISTRATIVE | Facility: OTHER | Age: 47
End: 2022-06-01
Payer: COMMERCIAL

## 2022-06-02 ENCOUNTER — HOSPITAL ENCOUNTER (INPATIENT)
Facility: HOSPITAL | Age: 47
LOS: 7 days | Discharge: HOME | DRG: 454 | End: 2022-06-09
Attending: NEUROLOGICAL SURGERY | Admitting: NEUROLOGICAL SURGERY
Payer: COMMERCIAL

## 2022-06-02 ENCOUNTER — ANESTHESIA (OUTPATIENT)
Dept: SURGERY | Facility: HOSPITAL | Age: 47
DRG: 454 | End: 2022-06-02
Payer: MEDICAID

## 2022-06-02 DIAGNOSIS — Z98.1 S/P LUMBAR FUSION: ICD-10-CM

## 2022-06-02 DIAGNOSIS — Z01.818 PREOPERATIVE TESTING: ICD-10-CM

## 2022-06-02 DIAGNOSIS — M54.16 LUMBAR RADICULOPATHY: Primary | ICD-10-CM

## 2022-06-02 LAB
ABO + RH BLD: NORMAL
BLD GP AB SCN CELLS X3 SERPL QL: NORMAL
CTP QC/QA: YES
SARS-COV-2 AG RESP QL IA.RAPID: NEGATIVE

## 2022-06-02 PROCEDURE — 22614 PR ARTHRODESIS, POST/POSTLAT, SNGL INTERSPACE, EA ADDTL: ICD-10-PCS | Mod: ,,, | Performed by: NEUROLOGICAL SURGERY

## 2022-06-02 PROCEDURE — 20936 PR AUTOGRAFT SPINE SURGERY LOCAL FROM SAME INCISION: ICD-10-PCS | Mod: ,,, | Performed by: NEUROLOGICAL SURGERY

## 2022-06-02 PROCEDURE — 25000003 PHARM REV CODE 250: Performed by: NURSE ANESTHETIST, CERTIFIED REGISTERED

## 2022-06-02 PROCEDURE — 22633 PR ARTHRODESIS, COMBINED TECHN, SNGL INTERSPACE, LUMBAR: ICD-10-PCS | Mod: ,,, | Performed by: NEUROLOGICAL SURGERY

## 2022-06-02 PROCEDURE — C1769 GUIDE WIRE: HCPCS | Performed by: NEUROLOGICAL SURGERY

## 2022-06-02 PROCEDURE — C1713 ANCHOR/SCREW BN/BN,TIS/BN: HCPCS | Performed by: NEUROLOGICAL SURGERY

## 2022-06-02 PROCEDURE — 36000710: Performed by: NEUROLOGICAL SURGERY

## 2022-06-02 PROCEDURE — 27800903 OPTIME MED/SURG SUP & DEVICES OTHER IMPLANTS: Performed by: NEUROLOGICAL SURGERY

## 2022-06-02 PROCEDURE — 71000016 HC POSTOP RECOV ADDL HR: Performed by: NEUROLOGICAL SURGERY

## 2022-06-02 PROCEDURE — 63600175 PHARM REV CODE 636 W HCPCS: Performed by: NEUROLOGICAL SURGERY

## 2022-06-02 PROCEDURE — 22853 PR INSERT BIOMECH DEV W/INTERBODY ARTHRODESIS, EA CONTIGUOUS DEFECT: ICD-10-PCS | Mod: ,,, | Performed by: NEUROLOGICAL SURGERY

## 2022-06-02 PROCEDURE — 71000015 HC POSTOP RECOV 1ST HR: Performed by: NEUROLOGICAL SURGERY

## 2022-06-02 PROCEDURE — 63600175 PHARM REV CODE 636 W HCPCS: Performed by: STUDENT IN AN ORGANIZED HEALTH CARE EDUCATION/TRAINING PROGRAM

## 2022-06-02 PROCEDURE — 20930 SP BONE ALGRFT MORSEL ADD-ON: CPT | Mod: ,,, | Performed by: NEUROLOGICAL SURGERY

## 2022-06-02 PROCEDURE — 61783 PR STEREOTACTIC COMP ASSIST PROC,SPINAL: ICD-10-PCS | Mod: ,,, | Performed by: NEUROLOGICAL SURGERY

## 2022-06-02 PROCEDURE — 36000711: Performed by: NEUROLOGICAL SURGERY

## 2022-06-02 PROCEDURE — 25000003 PHARM REV CODE 250: Performed by: ANESTHESIOLOGY

## 2022-06-02 PROCEDURE — 20930 PR ALLOGRAFT FOR SPINE SURGERY ONLY MORSELIZED: ICD-10-PCS | Mod: ,,, | Performed by: NEUROLOGICAL SURGERY

## 2022-06-02 PROCEDURE — 20936 SP BONE AGRFT LOCAL ADD-ON: CPT | Mod: ,,, | Performed by: NEUROLOGICAL SURGERY

## 2022-06-02 PROCEDURE — 22853 INSJ BIOMECHANICAL DEVICE: CPT | Mod: ,,, | Performed by: NEUROLOGICAL SURGERY

## 2022-06-02 PROCEDURE — 22614 ARTHRD PST TQ 1NTRSPC EA ADD: CPT | Mod: ,,, | Performed by: NEUROLOGICAL SURGERY

## 2022-06-02 PROCEDURE — D9220A PRA ANESTHESIA: ICD-10-PCS | Mod: CRNA,,, | Performed by: NURSE ANESTHETIST, CERTIFIED REGISTERED

## 2022-06-02 PROCEDURE — 22633 ARTHRD CMBN 1NTRSPC LUMBAR: CPT | Mod: ,,, | Performed by: NEUROLOGICAL SURGERY

## 2022-06-02 PROCEDURE — 25000003 PHARM REV CODE 250

## 2022-06-02 PROCEDURE — 22842 PR POSTERIOR SEGMENTAL INSTRUMENTATION 3-6 VRT SEG: ICD-10-PCS | Mod: ,,, | Performed by: NEUROLOGICAL SURGERY

## 2022-06-02 PROCEDURE — 25000003 PHARM REV CODE 250: Performed by: PHYSICIAN ASSISTANT

## 2022-06-02 PROCEDURE — 94761 N-INVAS EAR/PLS OXIMETRY MLT: CPT

## 2022-06-02 PROCEDURE — D9220A PRA ANESTHESIA: ICD-10-PCS | Mod: ANES,,, | Performed by: ANESTHESIOLOGY

## 2022-06-02 PROCEDURE — 63600175 PHARM REV CODE 636 W HCPCS: Performed by: ANESTHESIOLOGY

## 2022-06-02 PROCEDURE — 27000221 HC OXYGEN, UP TO 24 HOURS

## 2022-06-02 PROCEDURE — 11000001 HC ACUTE MED/SURG PRIVATE ROOM

## 2022-06-02 PROCEDURE — 25000003 PHARM REV CODE 250: Performed by: NEUROLOGICAL SURGERY

## 2022-06-02 PROCEDURE — 37000009 HC ANESTHESIA EA ADD 15 MINS: Performed by: NEUROLOGICAL SURGERY

## 2022-06-02 PROCEDURE — 22842 INSERT SPINE FIXATION DEVICE: CPT | Mod: ,,, | Performed by: NEUROLOGICAL SURGERY

## 2022-06-02 PROCEDURE — D9220A PRA ANESTHESIA: Mod: CRNA,,, | Performed by: NURSE ANESTHETIST, CERTIFIED REGISTERED

## 2022-06-02 PROCEDURE — 71000033 HC RECOVERY, INTIAL HOUR: Performed by: NEUROLOGICAL SURGERY

## 2022-06-02 PROCEDURE — 27201423 OPTIME MED/SURG SUP & DEVICES STERILE SUPPLY: Performed by: NEUROLOGICAL SURGERY

## 2022-06-02 PROCEDURE — 61783 SCAN PROC SPINAL: CPT | Mod: ,,, | Performed by: NEUROLOGICAL SURGERY

## 2022-06-02 PROCEDURE — 25000003 PHARM REV CODE 250: Performed by: STUDENT IN AN ORGANIZED HEALTH CARE EDUCATION/TRAINING PROGRAM

## 2022-06-02 PROCEDURE — 37000008 HC ANESTHESIA 1ST 15 MINUTES: Performed by: NEUROLOGICAL SURGERY

## 2022-06-02 PROCEDURE — 63600175 PHARM REV CODE 636 W HCPCS: Performed by: NURSE ANESTHETIST, CERTIFIED REGISTERED

## 2022-06-02 PROCEDURE — 99900035 HC TECH TIME PER 15 MIN (STAT)

## 2022-06-02 PROCEDURE — 86901 BLOOD TYPING SEROLOGIC RH(D): CPT | Performed by: ANESTHESIOLOGY

## 2022-06-02 PROCEDURE — D9220A PRA ANESTHESIA: Mod: ANES,,, | Performed by: ANESTHESIOLOGY

## 2022-06-02 DEVICE — SET SCREW SPINAL LOCKING: Type: IMPLANTABLE DEVICE | Site: SPINE LUMBAR | Status: FUNCTIONAL

## 2022-06-02 DEVICE — IMPLANTABLE DEVICE: Type: IMPLANTABLE DEVICE | Site: SPINE LUMBAR | Status: FUNCTIONAL

## 2022-06-02 DEVICE — SCREW SNIPER 6.5MM X 40MM: Type: IMPLANTABLE DEVICE | Site: SPINE LUMBAR | Status: FUNCTIONAL

## 2022-06-02 DEVICE — GWIRE SPINAL BLNT TIP 1.6X19: Type: IMPLANTABLE DEVICE | Site: SPINE LUMBAR | Status: FUNCTIONAL

## 2022-06-02 DEVICE — CAGE LEVA 10MM 25X10X10: Type: IMPLANTABLE DEVICE | Site: SPINE LUMBAR | Status: FUNCTIONAL

## 2022-06-02 DEVICE — CAP LOCKING CREO MIS: Type: IMPLANTABLE DEVICE | Site: SPINE LUMBAR | Status: FUNCTIONAL

## 2022-06-02 DEVICE — GRAFT ALTAPORE BIOACTIVE 10ML: Type: IMPLANTABLE DEVICE | Site: SPINE LUMBAR | Status: FUNCTIONAL

## 2022-06-02 DEVICE — ROD SPINAL 5.5X70 MULTI ANGLE: Type: IMPLANTABLE DEVICE | Site: SPINE LUMBAR | Status: FUNCTIONAL

## 2022-06-02 DEVICE — SCREW SNIPER 6.5MM X 45MM: Type: IMPLANTABLE DEVICE | Site: SPINE LUMBAR | Status: FUNCTIONAL

## 2022-06-02 DEVICE — TULIP CREO MIS MOD POLY 30MM: Type: IMPLANTABLE DEVICE | Site: SPINE LUMBAR | Status: FUNCTIONAL

## 2022-06-02 RX ORDER — MEPERIDINE HYDROCHLORIDE 50 MG/ML
12.5 INJECTION INTRAMUSCULAR; INTRAVENOUS; SUBCUTANEOUS EVERY 10 MIN PRN
Status: DISCONTINUED | OUTPATIENT
Start: 2022-06-02 | End: 2022-06-02 | Stop reason: HOSPADM

## 2022-06-02 RX ORDER — METHOCARBAMOL 500 MG/1
500 TABLET, FILM COATED ORAL 4 TIMES DAILY
Status: DISCONTINUED | OUTPATIENT
Start: 2022-06-02 | End: 2022-06-09 | Stop reason: HOSPADM

## 2022-06-02 RX ORDER — BISACODYL 10 MG
10 SUPPOSITORY, RECTAL RECTAL DAILY
Status: DISCONTINUED | OUTPATIENT
Start: 2022-06-03 | End: 2022-06-09 | Stop reason: HOSPADM

## 2022-06-02 RX ORDER — ROCURONIUM BROMIDE 10 MG/ML
INJECTION, SOLUTION INTRAVENOUS
Status: DISCONTINUED | OUTPATIENT
Start: 2022-06-02 | End: 2022-06-02

## 2022-06-02 RX ORDER — MUPIROCIN 20 MG/G
OINTMENT TOPICAL
Status: DISCONTINUED | OUTPATIENT
Start: 2022-06-02 | End: 2022-06-02

## 2022-06-02 RX ORDER — HYDROMORPHONE HYDROCHLORIDE 2 MG/ML
INJECTION, SOLUTION INTRAMUSCULAR; INTRAVENOUS; SUBCUTANEOUS
Status: DISCONTINUED | OUTPATIENT
Start: 2022-06-02 | End: 2022-06-02

## 2022-06-02 RX ORDER — ACETAMINOPHEN 500 MG
1000 TABLET ORAL EVERY 6 HOURS
Status: DISCONTINUED | OUTPATIENT
Start: 2022-06-03 | End: 2022-06-08

## 2022-06-02 RX ORDER — FENTANYL CITRATE 50 UG/ML
50 INJECTION, SOLUTION INTRAMUSCULAR; INTRAVENOUS
Status: DISCONTINUED | OUTPATIENT
Start: 2022-06-02 | End: 2022-06-03

## 2022-06-02 RX ORDER — MUPIROCIN 20 MG/G
1 OINTMENT TOPICAL 2 TIMES DAILY
Status: DISCONTINUED | OUTPATIENT
Start: 2022-06-02 | End: 2022-06-02

## 2022-06-02 RX ORDER — OXYCODONE HYDROCHLORIDE 10 MG/1
10 TABLET ORAL EVERY 6 HOURS PRN
Status: DISCONTINUED | OUTPATIENT
Start: 2022-06-02 | End: 2022-06-06

## 2022-06-02 RX ORDER — AMOXICILLIN 250 MG
2 CAPSULE ORAL NIGHTLY PRN
Status: DISCONTINUED | OUTPATIENT
Start: 2022-06-02 | End: 2022-06-06

## 2022-06-02 RX ORDER — ACETAMINOPHEN 10 MG/ML
1000 INJECTION, SOLUTION INTRAVENOUS ONCE
Status: COMPLETED | OUTPATIENT
Start: 2022-06-02 | End: 2022-06-02

## 2022-06-02 RX ORDER — DEXAMETHASONE SODIUM PHOSPHATE 4 MG/ML
INJECTION, SOLUTION INTRA-ARTICULAR; INTRALESIONAL; INTRAMUSCULAR; INTRAVENOUS; SOFT TISSUE
Status: DISCONTINUED | OUTPATIENT
Start: 2022-06-02 | End: 2022-06-02

## 2022-06-02 RX ORDER — LIDOCAINE HYDROCHLORIDE AND EPINEPHRINE 10; 10 MG/ML; UG/ML
INJECTION, SOLUTION INFILTRATION; PERINEURAL
Status: DISCONTINUED | OUTPATIENT
Start: 2022-06-02 | End: 2022-06-02 | Stop reason: HOSPADM

## 2022-06-02 RX ORDER — CEFAZOLIN SODIUM/WATER 2 G/20 ML
2 SYRINGE (ML) INTRAVENOUS
Status: COMPLETED | OUTPATIENT
Start: 2022-06-02 | End: 2022-06-02

## 2022-06-02 RX ORDER — CEFAZOLIN SODIUM/D5W 2 G/100 ML
PLASTIC BAG, INJECTION (ML) INTRAVENOUS
Status: DISCONTINUED | OUTPATIENT
Start: 2022-06-02 | End: 2022-06-02

## 2022-06-02 RX ORDER — CEFAZOLIN SODIUM/D5W 2 G/100 ML
2 PLASTIC BAG, INJECTION (ML) INTRAVENOUS
Status: COMPLETED | OUTPATIENT
Start: 2022-06-03 | End: 2022-06-03

## 2022-06-02 RX ORDER — LIDOCAINE HYDROCHLORIDE 20 MG/ML
INJECTION, SOLUTION EPIDURAL; INFILTRATION; INTRACAUDAL; PERINEURAL
Status: DISCONTINUED | OUTPATIENT
Start: 2022-06-02 | End: 2022-06-02

## 2022-06-02 RX ORDER — DIAZEPAM 10 MG/2ML
2.5 INJECTION INTRAMUSCULAR
Status: DISCONTINUED | OUTPATIENT
Start: 2022-06-02 | End: 2022-06-02 | Stop reason: HOSPADM

## 2022-06-02 RX ORDER — ACETAMINOPHEN 500 MG
1000 TABLET ORAL EVERY 6 HOURS
Status: DISCONTINUED | OUTPATIENT
Start: 2022-06-02 | End: 2022-06-02

## 2022-06-02 RX ORDER — HALOPERIDOL 5 MG/ML
0.5 INJECTION INTRAMUSCULAR EVERY 10 MIN PRN
Status: COMPLETED | OUTPATIENT
Start: 2022-06-02 | End: 2022-06-02

## 2022-06-02 RX ORDER — OXYCODONE HYDROCHLORIDE 5 MG/1
5 TABLET ORAL ONCE AS NEEDED
Status: DISCONTINUED | OUTPATIENT
Start: 2022-06-02 | End: 2022-06-02 | Stop reason: HOSPADM

## 2022-06-02 RX ORDER — MIDAZOLAM HYDROCHLORIDE 1 MG/ML
INJECTION, SOLUTION INTRAMUSCULAR; INTRAVENOUS
Status: DISCONTINUED | OUTPATIENT
Start: 2022-06-02 | End: 2022-06-02

## 2022-06-02 RX ORDER — GABAPENTIN 300 MG/1
300 CAPSULE ORAL 3 TIMES DAILY
Status: DISCONTINUED | OUTPATIENT
Start: 2022-06-02 | End: 2022-06-03

## 2022-06-02 RX ORDER — MAGNESIUM SULFATE 1 G/100ML
1 INJECTION INTRAVENOUS ONCE
Status: DISCONTINUED | OUTPATIENT
Start: 2022-06-02 | End: 2022-06-02

## 2022-06-02 RX ORDER — DEXMEDETOMIDINE HYDROCHLORIDE 100 UG/ML
INJECTION, SOLUTION INTRAVENOUS
Status: DISCONTINUED | OUTPATIENT
Start: 2022-06-02 | End: 2022-06-02

## 2022-06-02 RX ORDER — DIAZEPAM 5 MG/1
5 TABLET ORAL EVERY 6 HOURS PRN
Status: DISCONTINUED | OUTPATIENT
Start: 2022-06-02 | End: 2022-06-09 | Stop reason: HOSPADM

## 2022-06-02 RX ORDER — PHENYLEPHRINE HYDROCHLORIDE 10 MG/ML
INJECTION INTRAVENOUS CONTINUOUS PRN
Status: DISCONTINUED | OUTPATIENT
Start: 2022-06-02 | End: 2022-06-02

## 2022-06-02 RX ORDER — PROPOFOL 10 MG/ML
VIAL (ML) INTRAVENOUS
Status: DISCONTINUED | OUTPATIENT
Start: 2022-06-02 | End: 2022-06-02

## 2022-06-02 RX ORDER — HYDROMORPHONE HYDROCHLORIDE 1 MG/ML
0.2 INJECTION, SOLUTION INTRAMUSCULAR; INTRAVENOUS; SUBCUTANEOUS EVERY 5 MIN PRN
Status: DISCONTINUED | OUTPATIENT
Start: 2022-06-02 | End: 2022-06-02 | Stop reason: HOSPADM

## 2022-06-02 RX ORDER — FENTANYL CITRATE 50 UG/ML
25 INJECTION, SOLUTION INTRAMUSCULAR; INTRAVENOUS EVERY 5 MIN PRN
Status: DISCONTINUED | OUTPATIENT
Start: 2022-06-02 | End: 2022-06-02 | Stop reason: HOSPADM

## 2022-06-02 RX ORDER — ONDANSETRON 2 MG/ML
INJECTION INTRAMUSCULAR; INTRAVENOUS
Status: DISCONTINUED | OUTPATIENT
Start: 2022-06-02 | End: 2022-06-02

## 2022-06-02 RX ORDER — ONDANSETRON 2 MG/ML
4 INJECTION INTRAMUSCULAR; INTRAVENOUS DAILY PRN
Status: DISCONTINUED | OUTPATIENT
Start: 2022-06-02 | End: 2022-06-02 | Stop reason: HOSPADM

## 2022-06-02 RX ORDER — OXYCODONE HYDROCHLORIDE 5 MG/1
5 TABLET ORAL EVERY 4 HOURS PRN
Status: DISCONTINUED | OUTPATIENT
Start: 2022-06-02 | End: 2022-06-08

## 2022-06-02 RX ORDER — ACETAMINOPHEN 10 MG/ML
INJECTION, SOLUTION INTRAVENOUS
Status: DISCONTINUED | OUTPATIENT
Start: 2022-06-02 | End: 2022-06-02

## 2022-06-02 RX ORDER — KETAMINE HCL IN 0.9 % NACL 50 MG/5 ML
SYRINGE (ML) INTRAVENOUS
Status: DISCONTINUED | OUTPATIENT
Start: 2022-06-02 | End: 2022-06-02

## 2022-06-02 RX ORDER — MAGNESIUM SULFATE HEPTAHYDRATE 40 MG/ML
INJECTION, SOLUTION INTRAVENOUS
Status: DISCONTINUED | OUTPATIENT
Start: 2022-06-02 | End: 2022-06-02

## 2022-06-02 RX ORDER — MUPIROCIN 20 MG/G
OINTMENT TOPICAL
Status: COMPLETED
Start: 2022-06-02 | End: 2022-06-02

## 2022-06-02 RX ORDER — PHENYLEPHRINE HCL IN 0.9% NACL 1 MG/10 ML
SYRINGE (ML) INTRAVENOUS
Status: DISCONTINUED | OUTPATIENT
Start: 2022-06-02 | End: 2022-06-02

## 2022-06-02 RX ORDER — FAMOTIDINE 10 MG/ML
INJECTION INTRAVENOUS
Status: DISCONTINUED | OUTPATIENT
Start: 2022-06-02 | End: 2022-06-02

## 2022-06-02 RX ORDER — PROPOFOL 10 MG/ML
VIAL (ML) INTRAVENOUS CONTINUOUS PRN
Status: DISCONTINUED | OUTPATIENT
Start: 2022-06-02 | End: 2022-06-02

## 2022-06-02 RX ORDER — ACETAMINOPHEN 325 MG/1
650 TABLET ORAL EVERY 6 HOURS PRN
Status: DISCONTINUED | OUTPATIENT
Start: 2022-06-02 | End: 2022-06-08

## 2022-06-02 RX ADMIN — MIDAZOLAM HYDROCHLORIDE 2 MG: 1 INJECTION, SOLUTION INTRAMUSCULAR; INTRAVENOUS at 11:06

## 2022-06-02 RX ADMIN — GABAPENTIN 300 MG: 300 CAPSULE ORAL at 08:06

## 2022-06-02 RX ADMIN — MAGNESIUM SULFATE 1 G: 2 INJECTION INTRAVENOUS at 02:06

## 2022-06-02 RX ADMIN — FENTANYL CITRATE 50 MCG: 50 INJECTION INTRAMUSCULAR; INTRAVENOUS at 10:06

## 2022-06-02 RX ADMIN — HYDROMORPHONE HYDROCHLORIDE 0.2 MG: 1 INJECTION, SOLUTION INTRAMUSCULAR; INTRAVENOUS; SUBCUTANEOUS at 05:06

## 2022-06-02 RX ADMIN — FAMOTIDINE 20 MG: 10 INJECTION, SOLUTION INTRAVENOUS at 02:06

## 2022-06-02 RX ADMIN — HYDROMORPHONE HYDROCHLORIDE 0.2 MG: 2 INJECTION INTRAMUSCULAR; INTRAVENOUS; SUBCUTANEOUS at 04:06

## 2022-06-02 RX ADMIN — Medication 20 MG: at 02:06

## 2022-06-02 RX ADMIN — HALOPERIDOL LACTATE 0.5 MG: 5 INJECTION, SOLUTION INTRAMUSCULAR at 05:06

## 2022-06-02 RX ADMIN — Medication 200 MCG: at 12:06

## 2022-06-02 RX ADMIN — ACETAMINOPHEN 1000 MG: 10 INJECTION INTRAVENOUS at 07:06

## 2022-06-02 RX ADMIN — MUPIROCIN: 20 OINTMENT TOPICAL at 10:06

## 2022-06-02 RX ADMIN — DIAZEPAM 2.5 MG: 5 INJECTION, SOLUTION INTRAMUSCULAR; INTRAVENOUS at 05:06

## 2022-06-02 RX ADMIN — DEXTROSE 2 G: 50 INJECTION, SOLUTION INTRAVENOUS at 04:06

## 2022-06-02 RX ADMIN — OXYCODONE HYDROCHLORIDE 10 MG: 10 TABLET ORAL at 06:06

## 2022-06-02 RX ADMIN — PHENYLEPHRINE HYDROCHLORIDE 0.2 MCG/KG/MIN: 10 INJECTION INTRAVENOUS at 12:06

## 2022-06-02 RX ADMIN — DEXAMETHASONE SODIUM PHOSPHATE 4 MG: 4 INJECTION, SOLUTION INTRAMUSCULAR; INTRAVENOUS at 03:06

## 2022-06-02 RX ADMIN — LIDOCAINE HYDROCHLORIDE 100 MG: 20 INJECTION, SOLUTION EPIDURAL; INFILTRATION; INTRACAUDAL at 11:06

## 2022-06-02 RX ADMIN — METHOCARBAMOL 1000 MG: 100 INJECTION, SOLUTION INTRAMUSCULAR; INTRAVENOUS at 05:06

## 2022-06-02 RX ADMIN — SODIUM CHLORIDE: 0.9 INJECTION, SOLUTION INTRAVENOUS at 10:06

## 2022-06-02 RX ADMIN — SODIUM CHLORIDE, SODIUM GLUCONATE, SODIUM ACETATE, POTASSIUM CHLORIDE, MAGNESIUM CHLORIDE, SODIUM PHOSPHATE, DIBASIC, AND POTASSIUM PHOSPHATE: .53; .5; .37; .037; .03; .012; .00082 INJECTION, SOLUTION INTRAVENOUS at 11:06

## 2022-06-02 RX ADMIN — ROCURONIUM BROMIDE 50 MG: 10 INJECTION INTRAVENOUS at 11:06

## 2022-06-02 RX ADMIN — Medication 150 MCG/KG/MIN: at 11:06

## 2022-06-02 RX ADMIN — Medication 10 MG: at 03:06

## 2022-06-02 RX ADMIN — ONDANSETRON 4 MG: 2 INJECTION INTRAMUSCULAR; INTRAVENOUS at 07:06

## 2022-06-02 RX ADMIN — REMIFENTANIL HYDROCHLORIDE 0.2 MCG/KG/MIN: 1 INJECTION, POWDER, LYOPHILIZED, FOR SOLUTION INTRAVENOUS at 11:06

## 2022-06-02 RX ADMIN — ACETAMINOPHEN 1000 MG: 10 INJECTION, SOLUTION INTRAVENOUS at 02:06

## 2022-06-02 RX ADMIN — Medication 20 MG: at 12:06

## 2022-06-02 RX ADMIN — Medication 2 G: at 12:06

## 2022-06-02 RX ADMIN — PROPOFOL 200 MG: 10 INJECTION, EMULSION INTRAVENOUS at 11:06

## 2022-06-02 RX ADMIN — Medication 150 MCG/KG/MIN: at 03:06

## 2022-06-02 RX ADMIN — DEXMEDETOMIDINE HYDROCHLORIDE 8 MCG: 100 INJECTION, SOLUTION, CONCENTRATE INTRAVENOUS at 04:06

## 2022-06-02 RX ADMIN — HALOPERIDOL LACTATE 0.5 MG: 5 INJECTION, SOLUTION INTRAMUSCULAR at 06:06

## 2022-06-02 RX ADMIN — ONDANSETRON HYDROCHLORIDE 4 MG: 2 INJECTION INTRAMUSCULAR; INTRAVENOUS at 04:06

## 2022-06-02 NOTE — PROGRESS NOTES
Certification of Assistant at Surgery       Surgery Date: 6/2/2022     Participating Surgeons:  Surgeon(s) and Role:     * Humberto Rhodes MD - Primary     * Tre Kruse MD - Resident - Assisting     * Ra Vasquez MD - Resident - Assisting    Procedures:  Procedure(s) (LRB):  FUSION, SPINE, LUMBAR, TLIF, MINIMALLY INVASIVE L4-5, L5-S1 (N/A)    Assistant Surgeon's Certification of Necessity:  I understand that section 1842 (b) (6) (d) of the Social Security Act generally prohibits Medicare Part B reasonable charge payment for the services of assistants at surgery in teaching hospitals when qualified residents are available to furnish such services. I certify that the services for which payment is claimed were medically necessary, and that no qualified resident was available to perform the services. I further understand that these services are subject to post-payment review by the Medicare carrier.      Catalina Wolff PA-C    06/02/2022  5:14 PM

## 2022-06-02 NOTE — BRIEF OP NOTE
Asif Pugh - Surgery ([facility])  Brief Operative Note    SUMMARY     Surgery Date: 6/2/2022     Surgeon(s) and Role:     * Humberto Rhodes MD - Primary     * Tre Kruse MD - Resident - Assisting     * Ra Vasquez MD - Resident - Assisting        Pre-op Diagnosis:  Lumbar spondylosis [M47.816]    Post-op Diagnosis:  Post-Op Diagnosis Codes:     * Lumbar spondylosis [M47.816]    Procedure(s) (LRB):  FUSION, SPINE, LUMBAR, TLIF, MINIMALLY INVASIVE L4-5, L5-S1 (N/A)    Anesthesia: General    Operative Findings: good hardware placement      Estimated Blood Loss:   30cc    Specimens:   Specimen (24h ago, onward)            None          [id number]

## 2022-06-02 NOTE — ANESTHESIA POSTPROCEDURE EVALUATION
Anesthesia Post Evaluation    Patient: Junior Garay    Procedure(s) Performed: Procedure(s) (LRB):  FUSION, SPINE, LUMBAR, TLIF, MINIMALLY INVASIVE L4-5, L5-S1 (N/A)    Final Anesthesia Type: general      Patient location during evaluation: PACU  Patient participation: Yes- Able to Participate  Level of consciousness: awake and alert and oriented  Post-procedure vital signs: reviewed and stable  Pain management: adequate  Airway patency: patent    PONV status at discharge: No PONV  Anesthetic complications: no      Cardiovascular status: hemodynamically stable  Respiratory status: unassisted and spontaneous ventilation  Hydration status: euvolemic  Follow-up not needed.          Vitals Value Taken Time   /73 06/02/22 1731   Temp 36.6 °C (97.9 °F) 06/02/22 1706   Pulse 89 06/02/22 1734   Resp 27 06/02/22 1734   SpO2 93 % 06/02/22 1734   Vitals shown include unvalidated device data.      No case tracking events are documented in the log.      Pain/Mirna Score: Pain Rating Prior to Med Admin: 10 (6/2/2022  5:26 PM)  Mirna Score: 7 (6/2/2022  5:15 PM)

## 2022-06-02 NOTE — ADDENDUM NOTE
Addendum  created 06/02/22 1854 by Zia Piedra MD    Order list changed, Order sets accessed, Pharmacy for encounter modified

## 2022-06-02 NOTE — ANESTHESIA PREPROCEDURE EVALUATION
06/02/2022  Junior Garay is a 47 y.o., male.      Pre-op Assessment    I have reviewed the Patient Summary Reports.       I have reviewed the Medications.     Review of Systems  Anesthesia Hx:  No problems with previous Anesthesia Denies Hx of Anesthetic complications  Neg history of prior surgery. Denies Family Hx of Anesthesia complications.   Denies Personal Hx of Anesthesia complications.   Hematology/Oncology:  Hematology Normal   Oncology Normal     EENT/Dental:EENT/Dental Normal   Cardiovascular:   Exercise tolerance: good Hypertension, well controlled  Denies Angina.  Functional Capacity good / => 4 METS  Carotoid Artery Disease    Pulmonary:  Pulmonary Normal    Renal/:  Renal/ Normal     Hepatic/GI:   Denies GERD.    Musculoskeletal:   Arthritis   Spine Disorders: lumbar    Neurological:  Denies Pain    Endocrine:  Endocrine Normal    Psych:  Psychiatric Normal   Phobia and Claustrophobia.         Physical Exam  General: Well nourished and Cooperative    Airway:  Mallampati: I   Mouth Opening: Normal  TM Distance: Normal  Tongue: Normal  Neck ROM: Normal ROM    Dental:    Chest/Lungs:  Clear to auscultation, Normal Respiratory Rate    Heart:  Rate: Normal        Anesthesia Plan  Type of Anesthesia, risks & benefits discussed:    Anesthesia Type: Gen ETT  Intra-op Monitoring Plan: Standard ASA Monitors  Post Op Pain Control Plan: multimodal analgesia and IV/PO Opioids PRN  Induction:  IV  Airway Plan: , Post-Induction  Informed Consent: Informed consent signed with the Patient and all parties understand the risks and agree with anesthesia plan.  All questions answered.   ASA Score: 2  Day of Surgery Review of History & Physical: H&P Update referred to the surgeon/provider.    Ready For Surgery From Anesthesia Perspective.     .

## 2022-06-02 NOTE — TRANSFER OF CARE
"Anesthesia Transfer of Care Note    Patient: Junior Garay    Procedure(s) Performed: Procedure(s) (LRB):  FUSION, SPINE, LUMBAR, TLIF, MINIMALLY INVASIVE L4-5, L5-S1 (N/A)    Patient location: PACU    Anesthesia Type: general    Transport from OR: Transported from OR on 6-10 L/min O2 by face mask with adequate spontaneous ventilation    Post pain: adequate analgesia    Post assessment: no apparent anesthetic complications and tolerated procedure well    Post vital signs: stable    Level of consciousness: responds to stimulation and sedated    Nausea/Vomiting: no nausea/vomiting    Complications: none    Transfer of care protocol was followed      Last vitals:   Visit Vitals  BP (!) 110/56   Pulse 89   Temp 36.6 °C (97.9 °F) (Temporal)   Resp 18   Ht 5' 10" (1.778 m)   Wt 97.5 kg (215 lb)   SpO2 97%   BMI 30.85 kg/m²     "

## 2022-06-02 NOTE — ANESTHESIA PROCEDURE NOTES
Intubation    Date/Time: 6/2/2022 11:38 AM  Performed by: Juan Pablo York CRNA  Authorized by: Maximo Bello MD     Intubation:     Induction:  Intravenous    Intubated:  Postinduction    Mask Ventilation:  Easy with oral airway    Attempts:  2    Attempted By:  Student    Method of Intubation:  Direct    Blade:  David 3    Laryngeal View Grade: Grade IV - neither epiglottis nor glottis seen      Attempted By (2nd Attempt):  CRNA    Method of Intubation (2nd Attempt):  Direct    Blade (2nd Attempt):  [place] 2    Laryngeal View Grade (2nd Attempt): Grade I - full view of cords      Difficult Airway Encountered?: No      Complications:  None    Airway Device:  Oral endotracheal tube    Airway Device Size:  7.5    Style/Cuff Inflation:  Cuffed    Inflation Amount (mL):  10    Tube secured:  22    Secured at:  The lips    Placement Verified By:  Capnometry    Complicating Factors:  Oropharyngeal edema or fat and large/floppy epiglottis    Findings Post-Intubation:  BS equal bilateral and atraumatic/condition of teeth unchanged

## 2022-06-02 NOTE — H&P
I have reviewed the below note, and I concur with the findings. No changes have occurred to the history or physical since the note was written. Indications, risks, benefits, and alternatives of surgery discussed with the patient. He voices understanding and elects to proceed.     Tre Kruse MD  Neurosurgery  [facility]    Catalina Wolff PA-C    Physician Assistant   Specialty:  Neurosurgery   Progress Notes      Signed   Encounter Date:  3/23/2022   Creation Time:  3/23/2022  2:08 PM              Expand All Collapse All        Neurosurgery  Established Patient     SUBJECTIVE:      History of Present Illness:  This is a 46-year-old male who is a worker's comp patient who we have been following for some time now.  Patient had a history of of hurting his back lifting a hot water heater in 2020. He has been experiencing significant intractable back pain and radiculopathy and has failed physical therapy, gabapentin, Medrol Dosepak, pain medication as well as epidural and transforaminal injections.  Unfortunately nothing has really helped.  His pain continues to get worse and now impairing his daily living and impacting his ability to return to work.  His EMG does show active denervation of L5 and S1. He was scheduled for lumbar TLIF after his last visit but this was denied. He was referred for a second opinion through worker's comp as they did not feel he qualified for surgery at that time. The physician recommended that he obtain a discogram for further evaluation of his pain. His discogram was positive at L4-5, L5-S1 for DDD and severe concordant pain with injections at these levels. There was a normal control at L2-3. He denies any change in his back pain since his last visit. He continues with lateral right leg pain and weakness in his left foot. He denies new bowel or bladder incontinence.     He also continues wtih right knee pain and has been walking with a walker. he does have swelling,  guarding and pain with palpation to his right knee.     Review of patient's allergies indicates:  No Known Allergies     Current Medications          Current Outpatient Medications   Medication Sig Dispense Refill    acetaminophen (TYLENOL) 500 MG tablet Take 1 tablet (500 mg total) by mouth 2 (two) times a day. 60 tablet 0    cyclobenzaprine (FLEXERIL) 5 MG tablet Take 1 tablet (5 mg total) by mouth 3 (three) times daily as needed for Muscle spasms. 60 tablet 0    naproxen (NAPROSYN) 500 MG tablet Take 1 tablet (500 mg total) by mouth 2 (two) times daily with meals. 60 tablet 1      No current facility-administered medications for this visit.                 Past Medical History:   Diagnosis Date    Seizures              Past Surgical History:   Procedure Laterality Date    ABDOMINAL SURGERY        APPENDECTOMY        BACK SURGERY        COLON SURGERY        TRANSFORAMINAL EPIDURAL INJECTION OF STEROID Right 07/12/2021     Procedure: LUMBAR TRANSFORAMINAL RIGHT L4/5 DIRECT REFERRAL NEEDS CONSENT;  Surgeon: Ramya Lundy MD;  Location: [facility];  Service: Pain Management;  Laterality: Right;           Family History      Problem Relation (Age of Onset)     Cancer Mother     Heart attack Mother     No Known Problems Father          Social History      Socioeconomic History    Marital status:    Tobacco Use    Smoking status: Current Every Day Smoker       Packs/day: 0.50       Years: 10.00       Pack years: 5.00       Types: Cigarettes    Smokeless tobacco: Never Used   Substance and Sexual Activity    Alcohol use: No       Comment: socially    Drug use: No         Review of Systems  Constitutional: no fever, chills or night sweats. No changes in weight   Eyes: no visual changes   ENT: no nasal congestion or sore throat   Respiratory: no cough or shortness of breath   Cardiovascular: no chest pain or palpitations   Gastrointestinal: no nausea or vomiting   Genitourinary: no hematuria or  dysuria   Integument/Breast: no rash or pruritis   Hematologic/Lymphatic: no easy bruising or lymphadenopathy   Musculoskeletal: +arthralgias +myalgias, + back pain  Neurological: no seizures or tremors, + weakness  Behavioral/Psych: no auditory or visual hallucinations   Endocrine: no heat or cold intolerance   OBJECTIVE:      Vital Signs  Temp: 97.7 °F (36.5 °C)  Pulse: 86  BP: (!) 137/96  Pain Score: 10-Worst pain ever  There is no height or weight on file to calculate BMI.     Neurosurgery Physical Exam   General: well developed, well nourished, no distress.   Head: normocephalic, atraumatic  Neurologic: Alert and oriented. Thought content appropriate.  GCS: Motor: 6/Verbal: 5/Eyes: 4 GCS Total: 15  Mental Status: Awake, Alert, Oriented x3  Cranial nerves: face symmetric, tongue midline, CN II-XII grossly intact.   Eyes: pupils equal, round, reactive to light with accomodation, EOMI.   Sensory: intact to light touch throughout  Motor Strength:Moves all extremities spontaneously with good tone.  Full strength upper and lower extremities. No abnormal movements seen.      Strength   Deltoids Triceps Biceps Wrist Extension Wrist Flexion Hand    Upper: R 5/5 5/5 5/5 5/5 5/5 5/5     L 5/5 5/5 5/5 5/5 5/5 5/5       Iliopsoas Quadriceps Knee  Flexion Tibialis  anterior Gastro- cnemius EHL   Lower: R 5/5 5/5 5/5 4/5 4/5 4/5     L 5/5 5/5 5/5 5/5 5/5 5/5      DTR's - 2 + and symmetric in UE and LE except right ankle 1+  [place]: absent  Clonus: absent  Pulses: 2+ and symmetric radial and dorsalis pedis. No lower extremity edema  Straight leg raise: positive on the right   Gait: antalgic, walks with a cane           SI Joint tenderness: Negative            Pain on Hip ROM: Negative  Pain with palpation of the right knee. Mild swelling to the right knee, no erythema   Pain with flexion/extension of the right knee  TTP midline and paraspinal to Lumbar spine        Diagnostic Results:  I have reviewed his MRI scan and CT  scan of the lumbar spine dated 10/2021. I agree that he has extensive disc and facet disease at L4-5 and L5-S1.  Is clear evidence of annular tear at L4-5 and L5-S1.  Loss disc height.  There is right-sided foraminal narrowing bilaterally.  There is facet inflammation at both levels.     EMG with acute on chronic denervation in the left L5 and/or S1 myotomes suggestive of radiculopathy at those levels.      Discogram 2/22/22 reviewed. There was normal control at L2-3. L5-S1 and L4-5 had degenerative disc disease with annular tears and severe concordant pain with injections. These are positive discogram levels. L3-4 had posterior epidural tear and pain with injection, but this was atypical compared to his usual pain.        ASSESSMENT/PLAN:      46-year-old male with significant and ongoing lower back pain with right-sided radiculopathy.  MRI lumabr spine with extensive changes at L4-5 and his symptoms really began after the injury itself.  He has failed physical therapy, pain medications and injections.  I do not agree that a CT myelogram is needed as we have an adequate MRI lumbar spine. Lumbar discogram was positive for discogenic pain at L4-5, L5-S1 with a normal control at L2-3.  At this stage, we have exhausted all conservative measures and it is recommended that surgical intervention is warranted. After discussion with the pt and Dr. Rhodes, we have offered L4-5 TLIF with L5-S1 posterior fixation. R/B/A were reviewed with the patient and all of his questions were answered. Informed consent was obtained. We will need to obtain a repeat CT lumbar spine for surgical planning. I will also obtain xrays of his right knee and refer him to ortho for evaluation of this prior to surgery.     All symptoms and signs that require emergent or urgent treatment were reviewed. The plan was discussed with the patient. All of the the patient's questions and concerns were answered and he voiced understanding. Please feel free to  call with any further questions.        Catalina Wolff PA-C  Neurosurgery     Note dictated with voice recognition software, please excuse any grammatical errors.                  Electronically signed by Catalina Wolff PA-C at 3/23/2022  2:47 PM

## 2022-06-03 LAB
ANION GAP SERPL CALC-SCNC: 9 MMOL/L (ref 8–16)
BASOPHILS # BLD AUTO: 0 K/UL (ref 0–0.2)
BASOPHILS NFR BLD: 0 % (ref 0–1.9)
BUN SERPL-MCNC: 18 MG/DL (ref 6–20)
CALCIUM SERPL-MCNC: 9 MG/DL (ref 8.7–10.5)
CHLORIDE SERPL-SCNC: 104 MMOL/L (ref 95–110)
CO2 SERPL-SCNC: 22 MMOL/L (ref 23–29)
CREAT SERPL-MCNC: 1.2 MG/DL (ref 0.5–1.4)
DIFFERENTIAL METHOD: ABNORMAL
EOSINOPHIL # BLD AUTO: 0 K/UL (ref 0–0.5)
EOSINOPHIL NFR BLD: 0 % (ref 0–8)
ERYTHROCYTE [DISTWIDTH] IN BLOOD BY AUTOMATED COUNT: 12.7 % (ref 11.5–14.5)
EST. GFR  (AFRICAN AMERICAN): >60 ML/MIN/1.73 M^2
EST. GFR  (NON AFRICAN AMERICAN): >60 ML/MIN/1.73 M^2
GLUCOSE SERPL-MCNC: 152 MG/DL (ref 70–110)
HCT VFR BLD AUTO: 38.2 % (ref 40–54)
HGB BLD-MCNC: 12.8 G/DL (ref 14–18)
IMM GRANULOCYTES # BLD AUTO: 0.04 K/UL (ref 0–0.04)
IMM GRANULOCYTES NFR BLD AUTO: 0.5 % (ref 0–0.5)
LYMPHOCYTES # BLD AUTO: 0.7 K/UL (ref 1–4.8)
LYMPHOCYTES NFR BLD: 7.7 % (ref 18–48)
MCH RBC QN AUTO: 31 PG (ref 27–31)
MCHC RBC AUTO-ENTMCNC: 33.5 G/DL (ref 32–36)
MCV RBC AUTO: 93 FL (ref 82–98)
MONOCYTES # BLD AUTO: 0.5 K/UL (ref 0.3–1)
MONOCYTES NFR BLD: 6 % (ref 4–15)
NEUTROPHILS # BLD AUTO: 7.5 K/UL (ref 1.8–7.7)
NEUTROPHILS NFR BLD: 85.8 % (ref 38–73)
NRBC BLD-RTO: 0 /100 WBC
PLATELET # BLD AUTO: 285 K/UL (ref 150–450)
PMV BLD AUTO: 9.9 FL (ref 9.2–12.9)
POTASSIUM SERPL-SCNC: 4.1 MMOL/L (ref 3.5–5.1)
RBC # BLD AUTO: 4.13 M/UL (ref 4.6–6.2)
SODIUM SERPL-SCNC: 135 MMOL/L (ref 136–145)
WBC # BLD AUTO: 8.79 K/UL (ref 3.9–12.7)

## 2022-06-03 PROCEDURE — 25000003 PHARM REV CODE 250: Performed by: PHYSICIAN ASSISTANT

## 2022-06-03 PROCEDURE — 25000003 PHARM REV CODE 250: Performed by: NEUROLOGICAL SURGERY

## 2022-06-03 PROCEDURE — 94761 N-INVAS EAR/PLS OXIMETRY MLT: CPT

## 2022-06-03 PROCEDURE — 11000001 HC ACUTE MED/SURG PRIVATE ROOM

## 2022-06-03 PROCEDURE — 99900035 HC TECH TIME PER 15 MIN (STAT)

## 2022-06-03 PROCEDURE — 80048 BASIC METABOLIC PNL TOTAL CA: CPT | Performed by: PHYSICIAN ASSISTANT

## 2022-06-03 PROCEDURE — 85025 COMPLETE CBC W/AUTO DIFF WBC: CPT | Performed by: PHYSICIAN ASSISTANT

## 2022-06-03 PROCEDURE — 27000221 HC OXYGEN, UP TO 24 HOURS

## 2022-06-03 PROCEDURE — 63600175 PHARM REV CODE 636 W HCPCS: Performed by: PHYSICIAN ASSISTANT

## 2022-06-03 PROCEDURE — 36415 COLL VENOUS BLD VENIPUNCTURE: CPT | Performed by: PHYSICIAN ASSISTANT

## 2022-06-03 RX ORDER — GABAPENTIN 300 MG/1
600 CAPSULE ORAL 3 TIMES DAILY
Status: DISCONTINUED | OUTPATIENT
Start: 2022-06-03 | End: 2022-06-07

## 2022-06-03 RX ORDER — HEPARIN SODIUM 5000 [USP'U]/ML
5000 INJECTION, SOLUTION INTRAVENOUS; SUBCUTANEOUS EVERY 8 HOURS
Status: DISCONTINUED | OUTPATIENT
Start: 2022-06-03 | End: 2022-06-09 | Stop reason: HOSPADM

## 2022-06-03 RX ORDER — HYDROMORPHONE HYDROCHLORIDE 1 MG/ML
1 INJECTION, SOLUTION INTRAMUSCULAR; INTRAVENOUS; SUBCUTANEOUS
Status: DISCONTINUED | OUTPATIENT
Start: 2022-06-03 | End: 2022-06-03

## 2022-06-03 RX ORDER — LIDOCAINE 50 MG/G
2 PATCH TOPICAL
Status: DISCONTINUED | OUTPATIENT
Start: 2022-06-03 | End: 2022-06-09 | Stop reason: HOSPADM

## 2022-06-03 RX ORDER — MORPHINE SULFATE 2 MG/ML
2 INJECTION, SOLUTION INTRAMUSCULAR; INTRAVENOUS
Status: DISCONTINUED | OUTPATIENT
Start: 2022-06-03 | End: 2022-06-09

## 2022-06-03 RX ORDER — DEXAMETHASONE SODIUM PHOSPHATE 4 MG/ML
4 INJECTION, SOLUTION INTRA-ARTICULAR; INTRALESIONAL; INTRAMUSCULAR; INTRAVENOUS; SOFT TISSUE EVERY 6 HOURS
Status: COMPLETED | OUTPATIENT
Start: 2022-06-03 | End: 2022-06-04

## 2022-06-03 RX ORDER — DULOXETIN HYDROCHLORIDE 30 MG/1
30 CAPSULE, DELAYED RELEASE ORAL DAILY
Status: DISCONTINUED | OUTPATIENT
Start: 2022-06-03 | End: 2022-06-09 | Stop reason: HOSPADM

## 2022-06-03 RX ADMIN — GABAPENTIN 600 MG: 300 CAPSULE ORAL at 03:06

## 2022-06-03 RX ADMIN — ACETAMINOPHEN 1000 MG: 500 TABLET ORAL at 11:06

## 2022-06-03 RX ADMIN — LIDOCAINE 2 PATCH: 50 PATCH CUTANEOUS at 12:06

## 2022-06-03 RX ADMIN — MORPHINE SULFATE 2 MG: 2 INJECTION, SOLUTION INTRAMUSCULAR; INTRAVENOUS at 12:06

## 2022-06-03 RX ADMIN — DEXAMETHASONE SODIUM PHOSPHATE 4 MG: 4 INJECTION INTRA-ARTICULAR; INTRALESIONAL; INTRAMUSCULAR; INTRAVENOUS; SOFT TISSUE at 12:06

## 2022-06-03 RX ADMIN — HEPARIN SODIUM 5000 UNITS: 5000 INJECTION INTRAVENOUS; SUBCUTANEOUS at 05:06

## 2022-06-03 RX ADMIN — DIAZEPAM 5 MG: 5 TABLET ORAL at 10:06

## 2022-06-03 RX ADMIN — GABAPENTIN 300 MG: 300 CAPSULE ORAL at 08:06

## 2022-06-03 RX ADMIN — METHOCARBAMOL 500 MG: 500 TABLET ORAL at 08:06

## 2022-06-03 RX ADMIN — GABAPENTIN 600 MG: 300 CAPSULE ORAL at 08:06

## 2022-06-03 RX ADMIN — HEPARIN SODIUM 5000 UNITS: 5000 INJECTION INTRAVENOUS; SUBCUTANEOUS at 08:06

## 2022-06-03 RX ADMIN — DEXAMETHASONE SODIUM PHOSPHATE 4 MG: 4 INJECTION INTRA-ARTICULAR; INTRALESIONAL; INTRAMUSCULAR; INTRAVENOUS; SOFT TISSUE at 05:06

## 2022-06-03 RX ADMIN — DEXAMETHASONE SODIUM PHOSPHATE 4 MG: 4 INJECTION INTRA-ARTICULAR; INTRALESIONAL; INTRAMUSCULAR; INTRAVENOUS; SOFT TISSUE at 11:06

## 2022-06-03 RX ADMIN — ACETAMINOPHEN 1000 MG: 500 TABLET ORAL at 05:06

## 2022-06-03 RX ADMIN — MORPHINE SULFATE 2 MG: 2 INJECTION, SOLUTION INTRAMUSCULAR; INTRAVENOUS at 07:06

## 2022-06-03 RX ADMIN — OXYCODONE HYDROCHLORIDE 10 MG: 10 TABLET ORAL at 12:06

## 2022-06-03 RX ADMIN — DULOXETINE 30 MG: 30 CAPSULE, DELAYED RELEASE ORAL at 03:06

## 2022-06-03 RX ADMIN — METHOCARBAMOL 500 MG: 500 TABLET ORAL at 01:06

## 2022-06-03 RX ADMIN — ACETAMINOPHEN 1000 MG: 500 TABLET ORAL at 02:06

## 2022-06-03 RX ADMIN — DEXTROSE 2 G: 50 INJECTION, SOLUTION INTRAVENOUS at 08:06

## 2022-06-03 RX ADMIN — ACETAMINOPHEN 1000 MG: 500 TABLET ORAL at 12:06

## 2022-06-03 RX ADMIN — DEXTROSE 2 G: 50 INJECTION, SOLUTION INTRAVENOUS at 12:06

## 2022-06-03 RX ADMIN — OXYCODONE HYDROCHLORIDE 10 MG: 10 TABLET ORAL at 03:06

## 2022-06-03 RX ADMIN — METHOCARBAMOL 500 MG: 500 TABLET ORAL at 05:06

## 2022-06-03 RX ADMIN — OXYCODONE HYDROCHLORIDE 10 MG: 10 TABLET ORAL at 06:06

## 2022-06-03 NOTE — PLAN OF CARE
Problem: Adult Inpatient Plan of Care  Goal: Plan of Care Review  Outcome: Ongoing, Progressing  Goal: Patient-Specific Goal (Individualized)  Outcome: Ongoing, Progressing  Goal: Absence of Hospital-Acquired Illness or Injury  Outcome: Ongoing, Progressing  Intervention: Prevent Skin Injury  Flowsheets (Taken 6/3/2022 1437)  Body Position:   log-rolled   supine  Skin Protection:   adhesive use limited   incontinence pads utilized  Intervention: Prevent and Manage VTE (Venous Thromboembolism) Risk  Flowsheets (Taken 6/3/2022 1437)  VTE Prevention/Management: remove, assess skin, and reapply compression stockings  Range of Motion: active ROM (range of motion) encouraged  Goal: Optimal Comfort and Wellbeing  Outcome: Ongoing, Progressing  Intervention: Provide Person-Centered Care  Flowsheets (Taken 6/3/2022 1437)  Trust Relationship/Rapport:   care explained   choices provided   emotional support provided  Goal: Readiness for Transition of Care  Outcome: Ongoing, Progressing     Problem: Infection  Goal: Absence of Infection Signs and Symptoms  Outcome: Ongoing, Progressing     Problem: Pain Acute  Goal: Acceptable Pain Control and Functional Ability  Outcome: Ongoing, Progressing  Intervention: Develop Pain Management Plan  Flowsheets (Taken 6/3/2022 1437)  Pain Management Interventions:   care clustered   pain management plan reviewed with patient/caregiver   position adjusted   premedicated for activity     Problem: Skin Injury Risk Increased  Goal: Skin Health and Integrity  Outcome: Ongoing, Progressing  Intervention: Optimize Skin Protection  Flowsheets (Taken 6/3/2022 1437)  Skin Protection:   adhesive use limited   incontinence pads utilized  Head of Bed (HOB) Positioning: HOB flat

## 2022-06-03 NOTE — HPI
This is a 46-year-old male who is a worker's comp patient who we have been following for some time now.  Patient had a history of of hurting his back lifting a hot water heater in 2020. He has been experiencing significant intractable back pain and radiculopathy and has failed physical therapy, gabapentin, Medrol Dosepak, pain medication as well as epidural and transforaminal injections.  Unfortunately nothing has really helped.  His pain continues to get worse and now impairing his daily living and impacting his ability to return to work.  His EMG does show active denervation of L5 and S1. He was scheduled for lumbar TLIF after his last visit but this was denied. He was referred for a second opinion through worker's comp as they did not feel he qualified for surgery at that time. The physician recommended that he obtain a discogram for further evaluation of his pain. His discogram was positive at L4-5, L5-S1 for DDD and severe concordant pain with injections at these levels. There was a normal control at L2-3. He denies any change in his back pain since his last visit. He continues with lateral right leg pain and weakness in his left foot. He denies new bowel or bladder incontinence.  He also continues wtih right knee pain and has been walking with a walker. he does have swelling, guarding and pain with palpation to his right knee.

## 2022-06-03 NOTE — PT/OT/SLP PROGRESS
"Physical Therapy      Patient Name:  Junior Garay   MRN:  [id number]    Patient not seen today secondary to Bedrest. [facility] flat orders initiated on 6/3/22 at 4:31am until 6/4 at 5pm. Order stated "post procedure site assessment flat for 24 hours until tomorrow at 5pm. Flat for 24 hours until tomorrow at 5pm." Per chart, LSO brace ordered and awaiting delivery. Will follow-up 6/5/2022 as appropriate.         "

## 2022-06-03 NOTE — PROGRESS NOTES
Asif Pugh - Neurosurgery ([facility])  Neurosurgery  Progress Note    Subjective:     History of Present Illness: This is a 46-year-old male who is a worker's comp patient who we have been following for some time now.  Patient had a history of of hurting his back lifting a hot water heater in 2020. He has been experiencing significant intractable back pain and radiculopathy and has failed physical therapy, gabapentin, Medrol Dosepak, pain medication as well as epidural and transforaminal injections.  Unfortunately nothing has really helped.  His pain continues to get worse and now impairing his daily living and impacting his ability to return to work.  His EMG does show active denervation of L5 and S1. He was scheduled for lumbar TLIF after his last visit but this was denied. He was referred for a second opinion through worker's comp as they did not feel he qualified for surgery at that time. The physician recommended that he obtain a discogram for further evaluation of his pain. His discogram was positive at L4-5, L5-S1 for DDD and severe concordant pain with injections at these levels. There was a normal control at L2-3. He denies any change in his back pain since his last visit. He continues with lateral right leg pain and weakness in his left foot. He denies new bowel or bladder incontinence.  He also continues wtih right knee pain and has been walking with a walker. he does have swelling, guarding and pain with palpation to his right knee.      Post-Op Info:  Procedure(s) (LRB):  FUSION, SPINE, LUMBAR, TLIF, MINIMALLY INVASIVE L4-5, L5-S1 (N/A)   1 Day Post-Op     Interval History: POD 1 s/p L4-5 TLIF with L5-S1 posterior fixation. HOB flat until 6/4. LSO brace ordered. Pt with uncontrolled pain to his low back with complaint of radicular R leg pain and hip pain bilaterally. Morphine and lidocaine patches added to regimen. Exam stable without new or worsening weakness. CT lumbar spine today to to rule out hardware  complication.   Medications:  Continuous Infusions:  Scheduled Meds:   acetaminophen  1,000 mg Oral Q6H    bisacodyL  10 mg Rectal Daily    dexamethasone  4 mg Intravenous Q6H    gabapentin  600 mg Oral TID    methocarbamoL  500 mg Oral QID     PRN Meds:acetaminophen, diazePAM, HYDROmorphone, oxyCODONE, oxyCODONE, senna-docusate 8.6-50 mg     Review of Systems  Objective:     Weight: 97.5 kg (215 lb 0.2 oz)  Body mass index is 30.85 kg/m².  Vital Signs (Most Recent):  Temp: 98.5 °F (36.9 °C) (06/03/22 0802)  Pulse: 102 (06/03/22 0802)  Resp: 17 (06/03/22 0802)  BP: 116/81 (06/03/22 0802)  SpO2: 99 % (06/03/22 0802) Vital Signs (24h Range):  Temp:  [97.9 °F (36.6 °C)-98.7 °F (37.1 °C)] 98.5 °F (36.9 °C)  Pulse:  [] 102  Resp:  [10-30] 17  SpO2:  [90 %-99 %] 99 %  BP: (107-157)/(56-99) 116/81                     Male External Urinary Catheter 06/02/22 1955 (Active)   Collection Container Urimeter 06/02/22 2100   Securement Method secured to top of thigh w/ adhesive device 06/02/22 2100   Skin no redness;no breakdown 06/02/22 2100   Tolerance no signs/symptoms of discomfort 06/02/22 2100   Output (mL) 250 mL 06/02/22 2100   Catheter Change Date 06/04/22 06/02/22 2100   Catheter Change Time 2000 06/02/22 2100       Physical Exam    Neurosurgery Physical Exam  General: well developed, well nourished, no distress.   Head: normocephalic, atraumatic  Neurologic: Alert and oriented. Thought content appropriate.  GCS: Motor: 6/Verbal: 5/Eyes: 4 GCS Total: 15  Mental Status: Awake, Alert, Oriented x3  Cranial nerves: face symmetric, tongue midline, CN II-XII grossly intact.   Eyes: pupils equal, round, reactive to light with accomodation, EOMI.    Sensory: intact to light touch throughout  Motor Strength:Moves all extremities spontaneously with good tone. No abnormal movements seen. Pain limited hip flexor weakness.     Strength  Deltoids Triceps Biceps Wrist Extension Wrist Flexion Hand    Upper: R 5/5 5/5 5/5  5/5 5/5 5/5    L 5/5 5/5 5/5 5/5 5/5 5/5     Iliopsoas Quadriceps Knee  Flexion Tibialis  anterior Gastro- cnemius EHL   Lower: R 3/5 5/5 5/5 4+/5 4/5 5/5    L 3/5 5/5 5/5 5/5 5/5 5/5     Tender to palpation bilateral iliac crest. No ecchymosis noted.   Clonus: absent        Significant Labs:  Recent Labs   Lab 06/03/22  0737   *   *   K 4.1      CO2 22*   BUN 18   CREATININE 1.2   CALCIUM 9.0     Recent Labs   Lab 06/03/22  0737   WBC 8.79   HGB 12.8*   HCT 38.2*        No results for input(s): LABPT, INR, APTT in the last 48 hours.  Microbiology Results (last 7 days)       ** No results found for the last 168 hours. **          All pertinent labs from the last 24 hours have been reviewed.    Significant Diagnostics:  I have reviewed all pertinent imaging results/findings within the past 24 hours.    Assessment/Plan:     DDD (degenerative disc disease), lumbar   46-year-old male who is a worker's comp patient with history of of hurting his back lifting a hot water heater in 2020. He has been experiencing significant intractable back pain and radiculopathy and has failed conservative treatment. He continues with lateral right leg pain and weakness in his left foot. Denies new bowel or bladder incontinence. Pt is now s/p L4-5 TLIF with L5-S1 posterior fixation 6/2.       -- Neuro stable post op, however pt with uncontrolled pain    - Q4h neuro checks   -- Will obtain CT lumbar spine today to rule out hardware complication given uncontrolled pain.   - Post op XRs pending   --HOB flat due to intra-op CSF leak until 6/4  -- Pain control:  Oxycodone 5/10mg, Acetaminophen 1000mg Q6H, Gabapentin 600mg TID,   Added Dexamethasone 4mg IV, Lidocaine 5% patches,Morphine 5mg IV    -- PT/OT OOB once HOB restriction lifted    --LSO brace when up and OOB  -- -DVT prophylaxis: JOÃO's, SQH  -Bowel regimen: senna BID and miralax daily  -Atelectasis prevention: IS hourly while awake,  PT/OT                 Catalina Wolff PA-C  Neurosurgery  [facility] - Neurosurgery ([facility])

## 2022-06-03 NOTE — PLAN OF CARE
Problem: Adult Inpatient Plan of Care  Goal: Plan of Care Review  6/3/2022 0402 by Ger Samayoa RN  Outcome: Ongoing, Progressing  6/3/2022 0401 by Ger Samayoa RN  Outcome: Ongoing, Progressing  Goal: Absence of Hospital-Acquired Illness or Injury  Outcome: Ongoing, Progressing  Goal: Optimal Comfort and Wellbeing  6/3/2022 0402 by Ger Samayoa RN  Outcome: Ongoing, Progressing  6/3/2022 0401 by Ger Samayoa RN  Outcome: Ongoing, Progressing  Goal: Readiness for Transition of Care  6/3/2022 0402 by Ger Samayoa RN  Outcome: Ongoing, Progressing  6/3/2022 0401 by Ger Samayoa RN  Outcome: Ongoing, Progressing     Problem: Infection  Goal: Absence of Infection Signs and Symptoms  6/3/2022 0402 by Ger Samayoa RN  Outcome: Ongoing, Progressing  6/3/2022 0401 by Ger Samayoa RN  Outcome: Ongoing, Progressing     Problem: Pain Acute  Goal: Acceptable Pain Control and Functional Ability  Outcome: Ongoing, Progressing    Poc was reviewed with patient at bedside. Call light education was provide and patient and spouse oriented to the room. LSO braced ordered and pending delivery to the floor. Patient HOB is to remain flat for 24 hours. Bed is in the lowest position with rails up and wheels locked. Rounding is preformed q2h and vitals assessed q4hr

## 2022-06-03 NOTE — PLAN OF CARE
Asif Pugh - Neurosurgery ([facility])  Initial Discharge Assessment       Primary Care Provider: Mark Littlejohn MD    Admission Diagnosis: Lumbar spondylosis [M47.816]  Lumbar radiculopathy [M54.16]    Admission Date: 6/2/2022  Expected Discharge Date: 6/5/2022    Discharge Barriers Identified: None    Payor: LOULOU / Plan: LOULOU / Product Type: Worker's Comp /     Extended Emergency Contact Information  Primary Emergency Contact: GARCIA,WAKIJOSEKARLA  Mobile Phone: [phone]  Relation: Significant other  Preferred language: English   needed? No  Secondary Emergency Contact: Martha Jones  Mobile Phone: [phone]  Relation: Sister    Discharge Plan A: Home with family  Discharge Plan B: Home with family, Home Health      [facility] #[id number] - [place] - [address] AT [facility]  [address] [address]  Phone: [phone] Fax: [phone]    CM met with patient to obtain discharge planning assessment.  Patient provided with discharge planning booklet.  Patient physical address is [facility] [place] [address]  [address]    Initial Assessment (most recent)     Adult Discharge Assessment - 06/03/22 1458        Discharge Assessment    Assessment Type Discharge Planning Assessment     Confirmed/corrected address, phone number and insurance Yes     Confirmed Demographics Correct on Facesheet     Source of Information patient     Communicated ODIN with patient/caregiver Date not available/Unable to determine     Reason For Admission lumbar spondlyosis, lumbar radiculopathy     Lives With sibling(s)     Do you expect to return to your current living situation? Yes     Do you have help at home or someone to help you manage your care at home? Yes     Who are your caregiver(s) and their phone number(s)? Martha Jones - sister [phone]     Prior to hospitilization cognitive status: Alert/Oriented     Current cognitive status: Alert/Oriented     Walking or Climbing Stairs Difficulty  ambulation difficulty, requires equipment     Mobility Management cane     Readmission within 30 days? No     Patient currently being followed by outpatient case management? No     Do you currently have service(s) that help you manage your care at home? No     Do you take prescription medications? Yes     Do you have prescription coverage? Yes     Do you have any problems affording any of your prescribed medications? No     Is the patient taking medications as prescribed? yes     Who is going to help you get home at discharge? family     How do you get to doctors appointments? family or friend will provide     Are you on dialysis? No     Do you take coumadin? No     Discharge Plan A Home with family     Discharge Plan B Home with family;Home Health     DME Needed Upon Discharge  other (see comments)   tbd    Discharge Plan discussed with: Patient     Discharge Barriers Identified None        Relationship/Environment    Name(s) of Who Lives With Patient Martha - sister

## 2022-06-03 NOTE — NURSING TRANSFER
Nursing Transfer Note      6/2/2022     Reason patient is being transferred: s/p lumbar fusion    Transfer To: 930    Transfer via stretcher    Transfer with 2L NC    Transported by PCT    Medicines sent: none    Any special needs or follow-up needed: routine    Chart send with patient: Yes    Notified: spouse    Patient reassessed at: 6/2/22 @2000

## 2022-06-03 NOTE — PT/OT/SLP PROGRESS
"Occupational Therapy      Patient Name:  Junior Garay   MRN:  [id number]    Patient not seen today secondary to Bedrest. [facility] flat orders initiated on 6/3/22 at 4:31am until 6/4 at 5pm. Order stated "post procedure site assessment flat for 24 hours until tomorrow at 5pm. Flat for 24 hours until tomorrow at 5pm." Per chart, LSO brace ordered and awaiting delivery. Will follow-up 6/5/22.     6/3/2022  "

## 2022-06-03 NOTE — SUBJECTIVE & OBJECTIVE
Interval History: POD 1 s/p L4-5 TLIF with L5-S1 posterior fixation. HOB flat until 6/4. LSO brace ordered. Pt with uncontrolled pain to his low back with complaint of radicular R leg pain and hip pain bilaterally. Morphine and lidocaine patches added to regimen. Exam stable without new or worsening weakness. CT lumbar spine today to to rule out hardware complication.   Medications:  Continuous Infusions:  Scheduled Meds:   acetaminophen  1,000 mg Oral Q6H    bisacodyL  10 mg Rectal Daily    dexamethasone  4 mg Intravenous Q6H    gabapentin  600 mg Oral TID    methocarbamoL  500 mg Oral QID     PRN Meds:acetaminophen, diazePAM, HYDROmorphone, oxyCODONE, oxyCODONE, senna-docusate 8.6-50 mg     Review of Systems  Objective:     Weight: 97.5 kg (215 lb 0.2 oz)  Body mass index is 30.85 kg/m².  Vital Signs (Most Recent):  Temp: 98.5 °F (36.9 °C) (06/03/22 0802)  Pulse: 102 (06/03/22 0802)  Resp: 17 (06/03/22 0802)  BP: 116/81 (06/03/22 0802)  SpO2: 99 % (06/03/22 0802) Vital Signs (24h Range):  Temp:  [97.9 °F (36.6 °C)-98.7 °F (37.1 °C)] 98.5 °F (36.9 °C)  Pulse:  [] 102  Resp:  [10-30] 17  SpO2:  [90 %-99 %] 99 %  BP: (107-157)/(56-99) 116/81                     Male External Urinary Catheter 06/02/22 1955 (Active)   Collection Container Urimeter 06/02/22 2100   Securement Method secured to top of thigh w/ adhesive device 06/02/22 2100   Skin no redness;no breakdown 06/02/22 2100   Tolerance no signs/symptoms of discomfort 06/02/22 2100   Output (mL) 250 mL 06/02/22 2100   Catheter Change Date 06/04/22 06/02/22 2100   Catheter Change Time 2000 06/02/22 2100       Physical Exam    Neurosurgery Physical Exam  General: well developed, well nourished, no distress.   Head: normocephalic, atraumatic  Neurologic: Alert and oriented. Thought content appropriate.  GCS: Motor: 6/Verbal: 5/Eyes: 4 GCS Total: 15  Mental Status: Awake, Alert, Oriented x3  Cranial nerves: face symmetric, tongue midline, CN II-XII grossly  intact.   Eyes: pupils equal, round, reactive to light with accomodation, EOMI.    Sensory: intact to light touch throughout  Motor Strength:Moves all extremities spontaneously with good tone. No abnormal movements seen. Pain limited hip flexor weakness.     Strength  Deltoids Triceps Biceps Wrist Extension Wrist Flexion Hand    Upper: R 5/5 5/5 5/5 5/5 5/5 5/5    L 5/5 5/5 5/5 5/5 5/5 5/5     Iliopsoas Quadriceps Knee  Flexion Tibialis  anterior Gastro- cnemius EHL   Lower: R 3/5 5/5 5/5 4+/5 4/5 5/5    L 3/5 5/5 5/5 5/5 5/5 5/5     Tender to palpation bilateral iliac crest. No ecchymosis noted.   Clonus: absent        Significant Labs:  Recent Labs   Lab 06/03/22  0737   *   *   K 4.1      CO2 22*   BUN 18   CREATININE 1.2   CALCIUM 9.0     Recent Labs   Lab 06/03/22  0737   WBC 8.79   HGB 12.8*   HCT 38.2*        No results for input(s): LABPT, INR, APTT in the last 48 hours.  Microbiology Results (last 7 days)       ** No results found for the last 168 hours. **          All pertinent labs from the last 24 hours have been reviewed.    Significant Diagnostics:  I have reviewed all pertinent imaging results/findings within the past 24 hours.

## 2022-06-03 NOTE — ASSESSMENT & PLAN NOTE
46-year-old male who is a worker's comp patient with history of of hurting his back lifting a hot water heater in 2020. He has been experiencing significant intractable back pain and radiculopathy and has failed conservative treatment. He continues with lateral right leg pain and weakness in his left foot. Denies new bowel or bladder incontinence. Pt is now s/p L4-5 TLIF with L5-S1 posterior fixation 6/2.       -- Neuro stable post op, however pt with uncontrolled pain    - Q4h neuro checks   -- Will obtain CT lumbar spine today to rule out hardware complication given uncontrolled pain.   - Post op XRs pending   --HOB flat due to intra-op CSF leak until 6/4  -- Pain control:  Oxycodone 5/10mg, Acetaminophen 1000mg Q6H, Gabapentin 600mg TID,   Added Dexamethasone 4mg IV, Lidocaine 5% patches,Morphine 5mg IV    -- PT/OT OOB once HOB restriction lifted    --LSO brace when up and OOB  -- -DVT prophylaxis: JOÃO's, SQH  -Bowel regimen: senna BID and miralax daily  -Atelectasis prevention: IS hourly while awake, PT/OT

## 2022-06-04 PROCEDURE — 97535 SELF CARE MNGMENT TRAINING: CPT

## 2022-06-04 PROCEDURE — 11000001 HC ACUTE MED/SURG PRIVATE ROOM

## 2022-06-04 PROCEDURE — 25000003 PHARM REV CODE 250: Performed by: NEUROLOGICAL SURGERY

## 2022-06-04 PROCEDURE — 25000003 PHARM REV CODE 250: Performed by: STUDENT IN AN ORGANIZED HEALTH CARE EDUCATION/TRAINING PROGRAM

## 2022-06-04 PROCEDURE — 94761 N-INVAS EAR/PLS OXIMETRY MLT: CPT

## 2022-06-04 PROCEDURE — 63600175 PHARM REV CODE 636 W HCPCS: Performed by: PHYSICIAN ASSISTANT

## 2022-06-04 PROCEDURE — 99900035 HC TECH TIME PER 15 MIN (STAT)

## 2022-06-04 PROCEDURE — 97530 THERAPEUTIC ACTIVITIES: CPT

## 2022-06-04 PROCEDURE — 25000003 PHARM REV CODE 250: Performed by: PHYSICIAN ASSISTANT

## 2022-06-04 PROCEDURE — 97166 OT EVAL MOD COMPLEX 45 MIN: CPT

## 2022-06-04 RX ORDER — POLYETHYLENE GLYCOL 3350 17 G/17G
17 POWDER, FOR SOLUTION ORAL 2 TIMES DAILY
Status: DISCONTINUED | OUTPATIENT
Start: 2022-06-04 | End: 2022-06-09

## 2022-06-04 RX ADMIN — OXYCODONE HYDROCHLORIDE 10 MG: 10 TABLET ORAL at 08:06

## 2022-06-04 RX ADMIN — OXYCODONE HYDROCHLORIDE 10 MG: 10 TABLET ORAL at 10:06

## 2022-06-04 RX ADMIN — METHOCARBAMOL 500 MG: 500 TABLET ORAL at 06:06

## 2022-06-04 RX ADMIN — GABAPENTIN 600 MG: 300 CAPSULE ORAL at 04:06

## 2022-06-04 RX ADMIN — OXYCODONE HYDROCHLORIDE 10 MG: 10 TABLET ORAL at 04:06

## 2022-06-04 RX ADMIN — SENNOSIDES AND DOCUSATE SODIUM 2 TABLET: 50; 8.6 TABLET ORAL at 08:06

## 2022-06-04 RX ADMIN — POLYETHYLENE GLYCOL 3350 17 G: 17 POWDER, FOR SOLUTION ORAL at 10:06

## 2022-06-04 RX ADMIN — METHOCARBAMOL 500 MG: 500 TABLET ORAL at 08:06

## 2022-06-04 RX ADMIN — GABAPENTIN 600 MG: 300 CAPSULE ORAL at 08:06

## 2022-06-04 RX ADMIN — DEXAMETHASONE SODIUM PHOSPHATE 4 MG: 4 INJECTION INTRA-ARTICULAR; INTRALESIONAL; INTRAMUSCULAR; INTRAVENOUS; SOFT TISSUE at 05:06

## 2022-06-04 RX ADMIN — HEPARIN SODIUM 5000 UNITS: 5000 INJECTION INTRAVENOUS; SUBCUTANEOUS at 09:06

## 2022-06-04 RX ADMIN — DULOXETINE 30 MG: 30 CAPSULE, DELAYED RELEASE ORAL at 08:06

## 2022-06-04 RX ADMIN — ACETAMINOPHEN 1000 MG: 500 TABLET ORAL at 05:06

## 2022-06-04 RX ADMIN — ACETAMINOPHEN 1000 MG: 500 TABLET ORAL at 12:06

## 2022-06-04 RX ADMIN — HEPARIN SODIUM 5000 UNITS: 5000 INJECTION INTRAVENOUS; SUBCUTANEOUS at 04:06

## 2022-06-04 RX ADMIN — LIDOCAINE 2 PATCH: 50 PATCH CUTANEOUS at 12:06

## 2022-06-04 RX ADMIN — DIAZEPAM 5 MG: 5 TABLET ORAL at 08:06

## 2022-06-04 RX ADMIN — METHOCARBAMOL 500 MG: 500 TABLET ORAL at 12:06

## 2022-06-04 RX ADMIN — HEPARIN SODIUM 5000 UNITS: 5000 INJECTION INTRAVENOUS; SUBCUTANEOUS at 05:06

## 2022-06-04 RX ADMIN — OXYCODONE HYDROCHLORIDE 10 MG: 10 TABLET ORAL at 02:06

## 2022-06-04 NOTE — PT/OT/SLP EVAL
Occupational Therapy   Evaluation and Treatment    Name: Junior Garay  MRN: [id number]  Admitting Diagnosis:  <principal problem not specified> 2 Days Post-Op  Length of Stay: 2 days    Recommendations:     Discharge Recommendations: rehabilitation facility  Discharge Equipment Recommendations:  walker, rolling (TBD)  Barriers to discharge:   (Increased skilled A required)    Plan:     Patient to be seen 4 x/week to address the above listed problems via self-care/home management, therapeutic exercises, therapeutic activities, neuromuscular re-education  · Plan of Care Expires: 07/04/22  · Plan of Care Reviewed with: patient, significant other    Assessment:     Junior Garay is a 47 y.o. male with a medical diagnosis of <principal problem not specified>.  He presents with the following performance deficits affecting function: weakness, impaired endurance, impaired self care skills, impaired functional mobilty, gait instability, impaired balance, decreased upper extremity function, decreased lower extremity function, pain.      Pt recommended to d/c to Rehab for continued improvement in deficit areas and ADLs/functional mobility for increased functional independence and OQL prior to retrun to home environment. Pt would prefer HHOT and may progress, but due to gait instability, decreased step length, and increased time for functional mobility, pt is not safe to return home and would benefit from Inpatient Rehab services. Pt is required increased time on this date for functional mobility and bed mobility.      Rehab Prognosis: Good; patient would benefit from acute skilled OT services to address these deficits and reach maximum level of function.       Subjective   Communicated with: RN prior to session.  Patient found HOB elevated with pulse ox (continuous), telemetry, Condom Catheter upon OT entry to room.    Chief Complaint: Back Pain    Patient/Family Comments/goals: To get home as quickly as  "possible.    Pain/Comfort:  · Pain Rating 1: 0/10  · Location - Side 1: Bilateral  · Location - Orientation 1: generalized  · Location 1: back  · Pain Addressed 1: Reposition, Distraction, Cessation of Activity, Nurse notified  · Pain Rating Post-Intervention 1:  (unrated)    Patients cultural, spiritual, Nondenominational conflicts given the current situation: no    Occupational Profile:  Living Environment: Pt lives with sister in [facility] with no NEVA and tub shower combo.  Prior Level of Function: Patient reports being MOD I using Hurrycane with mobility & with ADLs. Pt performs cooking while seated at stove.  Patient uses DME as follows: other (see comments) (Hurry[employer]).   DME owned (not currently used): none.  Roles/Repsonsibilities:   Hand Dominance: right   Work: no.   Drive: no.   Managing Medicines/Managing Home: yes.   Hobbies: Cooking while seated at stove.  Equipment Used at Home:  other (see comments) (Hurrycane)    Patient reports they will have assistance from family and significant other upon discharge.      Objective:     Patient found with: pulse ox (continuous), telemetry, Condom Catheter   General Precautions: Standard, Cardiac fall   Orthopedic Precautions:spinal precautions   Braces: LSO   Respiratory Status:    Room air  Vitals: /75 (BP Location: Left arm, Patient Position: Lying)   Pulse 83   Temp 98.9 °F (37.2 °C) (Oral)   Resp 18   Ht 5' 10" (1.778 m)   Wt 97.5 kg (215 lb 0.2 oz)   SpO2 (!) 93%   BMI 30.85 kg/m²     Cognitive and Psychosocial Function:   · AxOx4 -- Person, Place, Time and Situation   · Follows Commands/attention:follows multi-step commands  · Communication:  clear/fluent  · Memory: No Deficits noted  · Safety awareness/insight to disability: intact   · Mood/Affect/Coping skills/emotional control: Appropriate to situation, Cooperative and Pleasant    Hearing: Intact    Vision:  Intact visual fields and wears glasses     Physical Exam:  Postural examination/scapula " alignment:    -       Rounded shoulders  -       Forward head  Skin integrity: Visible skin intact     Left UE Right UE   UE Edema absent absent   UE ROM AROM WFL AROM WFL   UE Strength 5/5, adequate ROM, adequate strength 5/5, adequate ROM, adequate strength    Strength grasp WFL grasp WFL   Sensation LUE INTACT:WFL and light/touch RUE INTACT: WFL and light/touch   Fine Motor Coordination:  LUE INTACT: WFL, hand, finger to nose and hand thumb/finger opposition skills  RUE INTACT: WFL, hand, finger to nose and hand thumb/finger opposition skills    Gross Motor Coordination: LUE INTACT: WFL RUE INTACT:WFL     Occupational Performance:  Bed Mobility:    · Patient completed Rolling/Turning to Left with  minimum assistance for RLE management  · Scooting to HOB in supine: stand by assistance with verbal cues  · Patient completed Supine to Sit with minimum assistance on L side of bed for trunk managment  · Scooting anteriorly to EOB to have both feet planted on floor: stand by assistance  · Patient completed Sit to Supine with minimum assistance on L side of bed for RLE management    Functional Mobility/Transfers:   Static Sitting EOB: SBA   Dynamic Sitting EOB: SBA to don LSO and gown over back   Patient completed Sit <> Stand Transfer with minimum assistance  with  rolling walker    Static Standing Balance: CGA with RW   Dynamic Standing Balance: CGA with RW to ambulate ~20ft x 2 to door and back to EOB.      Activities of Daily Living:  · Upper Body Dressing: minimum assistance to don gown over back and LSO while seated at EOB due to line management.  · Toileting: total assistance via condom catheter throughout session.      AMPAC 6 Click ADL:  AMPAC Total Score: 19    Treatment & Education:  -OT POC, safety during ADLs and mobility   -Education on energy conservation and task modification to maximize safety and independence  -Questions answered within OT scope of practice.      Patient left HOB elevated with  all lines intact, call button in reach, bed alarm on, RN notified and S.O. present    GOALS:   Multidisciplinary Problems     Occupational Therapy Goals        Problem: Occupational Therapy    Goal Priority Disciplines Outcome Interventions   Occupational Therapy Goal     OT, PT/OT Ongoing, Progressing    Description: Goals set on 6/4/2022 with expiration date 6/18/2022:  Patient will increase functional independence with ADLs by performing:    Supine <> Sit with Stand-by Assistance.  Feeding with Stand-by Assistance.  Grooming while standing at sink with Stand-by Assistance.  UB Dressing with Stand-by Assistance.  LB Dressing with Stand-by Assistance.  Step transfer with Stand-by Assistance with DME as needed.  Pt will demonstrate understanding of education provided regarding energy conservation and task modification through teach-back method.                          History:     Past Medical History:   Diagnosis Date    Anxiety     Depression     related to current illness    Seizures     Had seizures as a child       Past Surgical History:   Procedure Laterality Date    ABDOMINAL SURGERY      APPENDECTOMY      BACK SURGERY      COLON SURGERY      MINIMALLY INVASIVE TRANSFORAMINAL LUMBAR INTERBODY FUSION (TLIF) N/A 6/2/2022    Procedure: FUSION, SPINE, LUMBAR, TLIF, MINIMALLY INVASIVE L4-5, L5-S1;  Surgeon: Humberto Rhodes MD;  Location: [facility] OR [address] [place] [address];  Service: Neurosurgery;  Laterality: N/A;  [place] Table 4 poster, prone, neuromonitoring, globus robot, Heaven Giron     TRANSFORAMINAL EPIDURAL INJECTION OF STEROID Right 07/12/2021    Procedure: LUMBAR TRANSFORAMINAL RIGHT L4/5 DIRECT REFERRAL NEEDS CONSENT;  Surgeon: Ramya Lundy MD;  Location: [facility];  Service: Pain Management;  Laterality: Right;       Time Tracking:       OT Date of Treatment: 06/04/22  OT Start Time: 1250  OT Stop Time: 1324  OT Total Time (min): 34 min  Additional staff present: RN      Billable Minutes:Evaluation 10  min  Self Care/Home Management 8 min  Therapeutic Activity 16 min      6/4/2022

## 2022-06-04 NOTE — HOSPITAL COURSE
6/4: CECEEON. AFVSS. Feels improved strength this AM. Pain better controlled.   6/5: NAEON. AFVSS. Exam stable. Pain controlled. Tolerating PO. Voiding. Sat in bedside chair yesterday.   6/6: NAEON. VSS. Pt with nausea and HA with standing this am. BP remains stable. PRN zofran given. Pt denies positional HA. Reports the pain medication is contributing to his nausea but he continues to report right hip pain that radiates into his thigh on the right. He has been OOB with walker to the bathroom this am. Denies new numbness or paresthesias.   6/7: Pain is better controlled today, however he continues with nausea overnight. Denies emesis or positional HA. VSS. He did not receive much pain medication overnight. Hip Xray without acute process. IPR pending   6/8: CECEEON. VSS. Nausea resolved. Adjusted pain regimen today. He is requesting to go home and is uninterested in rehab placement. He remains neuro stable on exam.  6/9: NEAL. AFVSS. Pt doing well, pain relatively controlled on current regimen. Reports nausea has resolved since stopping gabapentin and robaxin. Neuro exam stable. PT/OT recs updated to . Medically ready for discharge. Discussed postop and discharge instructions, all questions answered, 2 week postop appointment scheduled. Encouraged to call our office with any questions or concerns.

## 2022-06-04 NOTE — PROGRESS NOTES
[facility] - Neurosurgery ([facility])  Neurosurgery  Progress Note    Subjective:     History of Present Illness: This is a 46-year-old male who is a worker's comp patient who we have been following for some time now.  Patient had a history of of hurting his back lifting a hot water heater in 2020. He has been experiencing significant intractable back pain and radiculopathy and has failed physical therapy, gabapentin, Medrol Dosepak, pain medication as well as epidural and transforaminal injections.  Unfortunately nothing has really helped.  His pain continues to get worse and now impairing his daily living and impacting his ability to return to work.  His EMG does show active denervation of L5 and S1. He was scheduled for lumbar TLIF after his last visit but this was denied. He was referred for a second opinion through worker's comp as they did not feel he qualified for surgery at that time. The physician recommended that he obtain a discogram for further evaluation of his pain. His discogram was positive at L4-5, L5-S1 for DDD and severe concordant pain with injections at these levels. There was a normal control at L2-3. He denies any change in his back pain since his last visit. He continues with lateral right leg pain and weakness in his left foot. He denies new bowel or bladder incontinence.  He also continues wtih right knee pain and has been walking with a walker. he does have swelling, guarding and pain with palpation to his right knee.      Post-Op Info:  Procedure(s) (LRB):  FUSION, SPINE, LUMBAR, TLIF, MINIMALLY INVASIVE L4-5, L5-S1 (N/A)   2 Days Post-Op     Interval History: 6/4: NEAL. GERIS. Feels improved strength this AM. Pain better controlled.     Medications:  Continuous Infusions:  Scheduled Meds:   acetaminophen  1,000 mg Oral Q6H    bisacodyL  10 mg Rectal Daily    DULoxetine  30 mg Oral Daily    gabapentin  600 mg Oral TID    heparin (porcine)  5,000 Units Subcutaneous Q8H    LIDOcaine  2  patch Transdermal Q24H    methocarbamoL  500 mg Oral QID     PRN Meds:acetaminophen, diazePAM, morphine, oxyCODONE, oxyCODONE, senna-docusate 8.6-50 mg     Review of Systems  Objective:     Weight: 97.5 kg (215 lb 0.2 oz)  Body mass index is 30.85 kg/m².  Vital Signs (Most Recent):  Temp: 98.1 °F (36.7 °C) (06/04/22 0511)  Pulse: 89 (06/04/22 0511)  Resp: 18 (06/04/22 0511)  BP: 129/81 (06/04/22 0511)  SpO2: 95 % (06/04/22 0511) Vital Signs (24h Range):  Temp:  [97 °F (36.1 °C)-99.2 °F (37.3 °C)] 98.1 °F (36.7 °C)  Pulse:  [] 89  Resp:  [15-20] 18  SpO2:  [94 %-99 %] 95 %  BP: (116-131)/(71-91) 129/81                     Male External Urinary Catheter 06/02/22 1955 (Active)   Collection Container Urimeter 06/02/22 2100   Securement Method secured to top of thigh w/ adhesive device 06/02/22 2100   Skin no redness;no breakdown 06/02/22 2100   Tolerance no signs/symptoms of discomfort 06/02/22 2100   Output (mL) 250 mL 06/02/22 2100   Catheter Change Date 06/04/22 06/02/22 2100   Catheter Change Time 2000 06/02/22 2100       Physical Exam    Neurosurgery Physical Exam  General: well developed, well nourished, no distress.   Head: normocephalic, atraumatic  Neurologic: Alert and oriented. Thought content appropriate.  GCS: Motor: 6/Verbal: 5/Eyes: 4 GCS Total: 15  Mental Status: Awake, Alert, Oriented x3  Cranial nerves: face symmetric, tongue midline, CN II-XII grossly intact.   Eyes: pupils equal, round, reactive to light with accomodation, EOMI.    Sensory: intact to light touch throughout  Motor Strength:Moves all extremities spontaneously with good tone. No abnormal movements seen. Pain limited hip flexor weakness.     Strength  Deltoids Triceps Biceps Wrist Extension Wrist Flexion Hand    Upper: R 5/5 5/5 5/5 5/5 5/5 5/5    L 5/5 5/5 5/5 5/5 5/5 5/5     Iliopsoas Quadriceps Knee  Flexion Tibialis  anterior Gastro- cnemius EHL   Lower: R 3/5 5/5 5/5 4+/5 4/5 5/5    L 4/5 5/5 5/5 5/5 5/5 5/5     Tender to  palpation bilateral iliac crest. No ecchymosis noted.   Clonus: absent    Dressing c/d/i        Significant Labs:  Recent Labs   Lab 06/03/22  0737   *   *   K 4.1      CO2 22*   BUN 18   CREATININE 1.2   CALCIUM 9.0       Recent Labs   Lab 06/03/22  0737   WBC 8.79   HGB 12.8*   HCT 38.2*          No results for input(s): LABPT, INR, APTT in the last 48 hours.  Microbiology Results (last 7 days)       ** No results found for the last 168 hours. **          All pertinent labs from the last 24 hours have been reviewed.    Significant Diagnostics:  I have reviewed all pertinent imaging results/findings within the past 24 hours.  CT Lumbar Spine Without Contrast    Result Date: 6/3/2022  Postoperative changes of posterior is demented fusion L4 through S1. Intervertebral disc spacer L4-5. Right laminectomy L4-5. Small amount of fluid and air within the subcutaneous soft tissue of the dependent aspect of the lower back, expected after lumbar surgery. High density material along the posterior aspect of the L5 vertebrae, on the right , possibly surgical material associated with the intervertebral disc spacer, to correlate clinically.  It narrows the right lateral canal at this level. Electronically signed by: Rosa Ragland MD Date:    06/03/2022 Time:    14:05       Assessment/Plan:     DDD (degenerative disc disease), lumbar   46-year-old male who is a worker's comp patient with history of of hurting his back lifting a hot water heater in 2020. He has been experiencing significant intractable back pain and radiculopathy and has failed conservative treatment. He continues with lateral right leg pain and weakness in his left foot. Denies new bowel or bladder incontinence. Pt is now s/p L4-5 TLIF with L5-S1 posterior fixation 6/2.       -- Neuro stable post op, however pt with uncontrolled pain    - Q4h neuro checks   --CT Lsp 6/3 with small amount of bone matrix and mild narrowing R L5  ventrolateral aspect of central canal.   - Post op XRs pending   --HOB flat due to intra-op CSF leak until 6/4; completed.  -- Pain control:  Oxycodone 5/10mg, Acetaminophen 1000mg Q6H, Gabapentin 600mg TID,   Added Dexamethasone 4mg IV, Lidocaine 5% patches,Morphine 5mg IV    -- PT/OT OOB  --LSO brace when up and OOB   -- -DVT prophylaxis: JOÃO's, SQH  -Bowel regimen: senna BID and miralax daily  -Atelectasis prevention: IS hourly while awake, PT/OT                   Tre Kruse MD  Neurosurgery  [facility] ([facility])

## 2022-06-04 NOTE — ASSESSMENT & PLAN NOTE
46-year-old male who is a worker's comp patient with history of of hurting his back lifting a hot water heater in 2020. He has been experiencing significant intractable back pain and radiculopathy and has failed conservative treatment. He continues with lateral right leg pain and weakness in his left foot. Denies new bowel or bladder incontinence. Pt is now s/p L4-5 TLIF with L5-S1 posterior fixation 6/2.       -- Neuro stable post op, however pt with uncontrolled pain    - Q4h neuro checks   --CT Lsp 6/3 with small amount of bone matrix and mild narrowing R L5 ventrolateral aspect of central canal.   - Post op XRs pending   --HOB flat due to intra-op CSF leak until 6/4; completed.  -- Pain control:  Oxycodone 5/10mg, Acetaminophen 1000mg Q6H, Gabapentin 600mg TID,   Added Dexamethasone 4mg IV, Lidocaine 5% patches,Morphine 5mg IV    -- PT/OT OOB  --LSO brace when up and OOB   -- -DVT prophylaxis: JOÃO's, SQH  -Bowel regimen: senna BID and miralax daily  -Atelectasis prevention: IS hourly while awake, PT/OT

## 2022-06-04 NOTE — PLAN OF CARE
Problem: Adult Inpatient Plan of Care  Goal: Plan of Care Review  Outcome: Ongoing, Progressing  Goal: Patient-Specific Goal (Individualized)  Outcome: Ongoing, Progressing  Goal: Absence of Hospital-Acquired Illness or Injury  Outcome: Ongoing, Progressing  Goal: Optimal Comfort and Wellbeing  Outcome: Ongoing, Progressing  Goal: Readiness for Transition of Care  Outcome: Ongoing, Progressing     Problem: Infection  Goal: Absence of Infection Signs and Symptoms  Outcome: Ongoing, Progressing  Intervention: Prevent or Manage Infection  Flowsheets (Taken 6/4/2022 1818)  Fever Reduction/Comfort Measures:   lightweight bedding   lightweight clothing  Infection Management: aseptic technique maintained     Problem: Pain Acute  Goal: Acceptable Pain Control and Functional Ability  Outcome: Ongoing, Progressing  Intervention: Develop Pain Management Plan  Flowsheets (Taken 6/4/2022 1818)  Pain Management Interventions:   care clustered   medication offered     Problem: Skin Injury Risk Increased  Goal: Skin Health and Integrity  Outcome: Ongoing, Progressing  Intervention: Optimize Skin Protection  Flowsheets (Taken 6/4/2022 1818)  Pressure Reduction Techniques: frequent weight shift encouraged  Head of Bed (HOB) Positioning: HOB at 30-45 degrees

## 2022-06-04 NOTE — PLAN OF CARE
Problem: Adult Inpatient Plan of Care  Goal: Readiness for Transition of Care  Outcome: Ongoing, Progressing     Problem: Infection  Goal: Absence of Infection Signs and Symptoms  Outcome: Ongoing, Progressing     Problem: Pain Acute  Goal: Acceptable Pain Control and Functional Ability  Outcome: Ongoing, Progressing     Problem: Skin Injury Risk Increased  Goal: Skin Health and Integrity  Outcome: Ongoing, Progressing   Poc was reviewed with patient at bedside. Call light education was provide.. LSO braced ordered for out of bed. Patient HOB is elevated. Bed is in the lowest position with rails up and wheels locked. Rounding is preformed q2h and vitals assessed q4hr

## 2022-06-04 NOTE — PLAN OF CARE
Pt eval complete and OT POC established.    Problem: Occupational Therapy  Goal: Occupational Therapy Goal  Description: Goals set on 6/4/2022 with expiration date 6/18/2022:  Patient will increase functional independence with ADLs by performing:    Supine <> Sit with Stand-by Assistance.  Feeding with Stand-by Assistance.  Grooming while standing at sink with Stand-by Assistance.  UB Dressing with Stand-by Assistance.  LB Dressing with Stand-by Assistance.  Step transfer with Stand-by Assistance with DME as needed.  Pt will demonstrate understanding of education provided regarding energy conservation and task modification through teach-back method.         Outcome: Ongoing, Progressing

## 2022-06-04 NOTE — SUBJECTIVE & OBJECTIVE
Interval History: 6/4: NAEON. AFVSS. Feels improved strength this AM. Pain better controlled.     Medications:  Continuous Infusions:  Scheduled Meds:   acetaminophen  1,000 mg Oral Q6H    bisacodyL  10 mg Rectal Daily    DULoxetine  30 mg Oral Daily    gabapentin  600 mg Oral TID    heparin (porcine)  5,000 Units Subcutaneous Q8H    LIDOcaine  2 patch Transdermal Q24H    methocarbamoL  500 mg Oral QID     PRN Meds:acetaminophen, diazePAM, morphine, oxyCODONE, oxyCODONE, senna-docusate 8.6-50 mg     Review of Systems  Objective:     Weight: 97.5 kg (215 lb 0.2 oz)  Body mass index is 30.85 kg/m².  Vital Signs (Most Recent):  Temp: 98.1 °F (36.7 °C) (06/04/22 0511)  Pulse: 89 (06/04/22 0511)  Resp: 18 (06/04/22 0511)  BP: 129/81 (06/04/22 0511)  SpO2: 95 % (06/04/22 0511) Vital Signs (24h Range):  Temp:  [97 °F (36.1 °C)-99.2 °F (37.3 °C)] 98.1 °F (36.7 °C)  Pulse:  [] 89  Resp:  [15-20] 18  SpO2:  [94 %-99 %] 95 %  BP: (116-131)/(71-91) 129/81                     Male External Urinary Catheter 06/02/22 1955 (Active)   Collection Container Urimeter 06/02/22 2100   Securement Method secured to top of thigh w/ adhesive device 06/02/22 2100   Skin no redness;no breakdown 06/02/22 2100   Tolerance no signs/symptoms of discomfort 06/02/22 2100   Output (mL) 250 mL 06/02/22 2100   Catheter Change Date 06/04/22 06/02/22 2100   Catheter Change Time 2000 06/02/22 2100       Physical Exam    Neurosurgery Physical Exam  General: well developed, well nourished, no distress.   Head: normocephalic, atraumatic  Neurologic: Alert and oriented. Thought content appropriate.  GCS: Motor: 6/Verbal: 5/Eyes: 4 GCS Total: 15  Mental Status: Awake, Alert, Oriented x3  Cranial nerves: face symmetric, tongue midline, CN II-XII grossly intact.   Eyes: pupils equal, round, reactive to light with accomodation, EOMI.    Sensory: intact to light touch throughout  Motor Strength:Moves all extremities spontaneously with good tone. No abnormal  movements seen. Pain limited hip flexor weakness.     Strength  Deltoids Triceps Biceps Wrist Extension Wrist Flexion Hand    Upper: R 5/5 5/5 5/5 5/5 5/5 5/5    L 5/5 5/5 5/5 5/5 5/5 5/5     Iliopsoas Quadriceps Knee  Flexion Tibialis  anterior Gastro- cnemius EHL   Lower: R 3/5 5/5 5/5 4+/5 4/5 5/5    L 4/5 5/5 5/5 5/5 5/5 5/5     Tender to palpation bilateral iliac crest. No ecchymosis noted.   Clonus: absent    Dressing c/d/i        Significant Labs:  Recent Labs   Lab 06/03/22  0737   *   *   K 4.1      CO2 22*   BUN 18   CREATININE 1.2   CALCIUM 9.0       Recent Labs   Lab 06/03/22  0737   WBC 8.79   HGB 12.8*   HCT 38.2*          No results for input(s): LABPT, INR, APTT in the last 48 hours.  Microbiology Results (last 7 days)       ** No results found for the last 168 hours. **          All pertinent labs from the last 24 hours have been reviewed.    Significant Diagnostics:  I have reviewed all pertinent imaging results/findings within the past 24 hours.  CT Lumbar Spine Without Contrast    Result Date: 6/3/2022  Postoperative changes of posterior is demented fusion L4 through S1. Intervertebral disc spacer L4-5. Right laminectomy L4-5. Small amount of fluid and air within the subcutaneous soft tissue of the dependent aspect of the lower back, expected after lumbar surgery. High density material along the posterior aspect of the L5 vertebrae, on the right , possibly surgical material associated with the intervertebral disc spacer, to correlate clinically.  It narrows the right lateral canal at this level. Electronically signed by: Rosa Ragland MD Date:    06/03/2022 Time:    14:05

## 2022-06-05 PROCEDURE — 94761 N-INVAS EAR/PLS OXIMETRY MLT: CPT

## 2022-06-05 PROCEDURE — 25000003 PHARM REV CODE 250: Performed by: STUDENT IN AN ORGANIZED HEALTH CARE EDUCATION/TRAINING PROGRAM

## 2022-06-05 PROCEDURE — 97161 PT EVAL LOW COMPLEX 20 MIN: CPT

## 2022-06-05 PROCEDURE — 97116 GAIT TRAINING THERAPY: CPT

## 2022-06-05 PROCEDURE — 25000003 PHARM REV CODE 250: Performed by: NEUROLOGICAL SURGERY

## 2022-06-05 PROCEDURE — 11000001 HC ACUTE MED/SURG PRIVATE ROOM

## 2022-06-05 PROCEDURE — 94799 UNLISTED PULMONARY SVC/PX: CPT

## 2022-06-05 PROCEDURE — 25000003 PHARM REV CODE 250: Performed by: PHYSICIAN ASSISTANT

## 2022-06-05 PROCEDURE — 63600175 PHARM REV CODE 636 W HCPCS: Performed by: PHYSICIAN ASSISTANT

## 2022-06-05 RX ADMIN — ACETAMINOPHEN 1000 MG: 500 TABLET ORAL at 12:06

## 2022-06-05 RX ADMIN — OXYCODONE HYDROCHLORIDE 10 MG: 10 TABLET ORAL at 09:06

## 2022-06-05 RX ADMIN — POLYETHYLENE GLYCOL 3350 17 G: 17 POWDER, FOR SOLUTION ORAL at 09:06

## 2022-06-05 RX ADMIN — POLYETHYLENE GLYCOL 3350 17 G: 17 POWDER, FOR SOLUTION ORAL at 08:06

## 2022-06-05 RX ADMIN — DIAZEPAM 5 MG: 5 TABLET ORAL at 11:06

## 2022-06-05 RX ADMIN — SENNOSIDES AND DOCUSATE SODIUM 2 TABLET: 50; 8.6 TABLET ORAL at 09:06

## 2022-06-05 RX ADMIN — DULOXETINE 30 MG: 30 CAPSULE, DELAYED RELEASE ORAL at 08:06

## 2022-06-05 RX ADMIN — HEPARIN SODIUM 5000 UNITS: 5000 INJECTION INTRAVENOUS; SUBCUTANEOUS at 09:06

## 2022-06-05 RX ADMIN — ACETAMINOPHEN 1000 MG: 500 TABLET ORAL at 01:06

## 2022-06-05 RX ADMIN — OXYCODONE HYDROCHLORIDE 10 MG: 10 TABLET ORAL at 05:06

## 2022-06-05 RX ADMIN — GABAPENTIN 600 MG: 300 CAPSULE ORAL at 09:06

## 2022-06-05 RX ADMIN — HEPARIN SODIUM 5000 UNITS: 5000 INJECTION INTRAVENOUS; SUBCUTANEOUS at 03:06

## 2022-06-05 RX ADMIN — LIDOCAINE 2 PATCH: 50 PATCH CUTANEOUS at 01:06

## 2022-06-05 RX ADMIN — GABAPENTIN 600 MG: 300 CAPSULE ORAL at 08:06

## 2022-06-05 RX ADMIN — HEPARIN SODIUM 5000 UNITS: 5000 INJECTION INTRAVENOUS; SUBCUTANEOUS at 05:06

## 2022-06-05 RX ADMIN — METHOCARBAMOL 500 MG: 500 TABLET ORAL at 09:06

## 2022-06-05 RX ADMIN — MORPHINE SULFATE 2 MG: 2 INJECTION, SOLUTION INTRAMUSCULAR; INTRAVENOUS at 02:06

## 2022-06-05 RX ADMIN — METHOCARBAMOL 500 MG: 500 TABLET ORAL at 01:06

## 2022-06-05 RX ADMIN — ACETAMINOPHEN 1000 MG: 500 TABLET ORAL at 05:06

## 2022-06-05 RX ADMIN — BISACODYL 10 MG: 10 SUPPOSITORY RECTAL at 08:06

## 2022-06-05 RX ADMIN — GABAPENTIN 600 MG: 300 CAPSULE ORAL at 03:06

## 2022-06-05 RX ADMIN — METHOCARBAMOL 500 MG: 500 TABLET ORAL at 05:06

## 2022-06-05 RX ADMIN — OXYCODONE HYDROCHLORIDE 10 MG: 10 TABLET ORAL at 01:06

## 2022-06-05 RX ADMIN — METHOCARBAMOL 500 MG: 500 TABLET ORAL at 08:06

## 2022-06-05 NOTE — PROGRESS NOTES
Asif Pugh - Neurosurgery ([facility])  Neurosurgery  Progress Note    Subjective:     History of Present Illness: This is a 46-year-old male who is a worker's comp patient who we have been following for some time now.  Patient had a history of of hurting his back lifting a hot water heater in 2020. He has been experiencing significant intractable back pain and radiculopathy and has failed physical therapy, gabapentin, Medrol Dosepak, pain medication as well as epidural and transforaminal injections.  Unfortunately nothing has really helped.  His pain continues to get worse and now impairing his daily living and impacting his ability to return to work.  His EMG does show active denervation of L5 and S1. He was scheduled for lumbar TLIF after his last visit but this was denied. He was referred for a second opinion through worker's comp as they did not feel he qualified for surgery at that time. The physician recommended that he obtain a discogram for further evaluation of his pain. His discogram was positive at L4-5, L5-S1 for DDD and severe concordant pain with injections at these levels. There was a normal control at L2-3. He denies any change in his back pain since his last visit. He continues with lateral right leg pain and weakness in his left foot. He denies new bowel or bladder incontinence.  He also continues wtih right knee pain and has been walking with a walker. he does have swelling, guarding and pain with palpation to his right knee.      Post-Op Info:  Procedure(s) (LRB):  FUSION, SPINE, LUMBAR, TLIF, MINIMALLY INVASIVE L4-5, L5-S1 (N/A)   3 Days Post-Op     Interval History: 6/5: NAEON. AFVSS. Exam stable. Pain controlled. Tolerating PO. Voiding. Sat in bedside chair yesterday.     Medications:  Continuous Infusions:  Scheduled Meds:   acetaminophen  1,000 mg Oral Q6H    bisacodyL  10 mg Rectal Daily    DULoxetine  30 mg Oral Daily    gabapentin  600 mg Oral TID    heparin (porcine)  5,000 Units  Subcutaneous Q8H    LIDOcaine  2 patch Transdermal Q24H    methocarbamoL  500 mg Oral QID    polyethylene glycol  17 g Oral BID     PRN Meds:acetaminophen, diazePAM, morphine, oxyCODONE, oxyCODONE, senna-docusate 8.6-50 mg     Review of Systems  Objective:     Weight: 97.5 kg (215 lb 0.2 oz)  Body mass index is 30.85 kg/m².  Vital Signs (Most Recent):  Temp: 98.3 °F (36.8 °C) (06/05/22 0418)  Pulse: 80 (06/05/22 0418)  Resp: 17 (06/05/22 0554)  BP: 130/87 (06/05/22 0418)  SpO2: 96 % (06/05/22 0418) Vital Signs (24h Range):  Temp:  [98.3 °F (36.8 °C)-99.3 °F (37.4 °C)] 98.3 °F (36.8 °C)  Pulse:  [80-90] 80  Resp:  [16-20] 17  SpO2:  [91 %-97 %] 96 %  BP: (121-143)/(75-94) 130/87                     Male External Urinary Catheter 06/02/22 1955 (Active)   Collection Container Urimeter 06/02/22 2100   Securement Method secured to top of thigh w/ adhesive device 06/02/22 2100   Skin no redness;no breakdown 06/02/22 2100   Tolerance no signs/symptoms of discomfort 06/02/22 2100   Output (mL) 250 mL 06/02/22 2100   Catheter Change Date 06/04/22 06/02/22 2100   Catheter Change Time 2000 06/02/22 2100       Physical Exam    Neurosurgery Physical Exam  General: well developed, well nourished, no distress.   Head: normocephalic, atraumatic  Neurologic: Alert and oriented. Thought content appropriate.  GCS: Motor: 6/Verbal: 5/Eyes: 4 GCS Total: 15  Mental Status: Awake, Alert, Oriented x3  Cranial nerves: face symmetric, tongue midline, CN II-XII grossly intact.   Eyes: pupils equal, round, reactive to light with accomodation, EOMI.    Sensory: intact to light touch throughout  Motor Strength:Moves all extremities spontaneously with good tone. No abnormal movements seen. Pain limited hip flexor weakness.     Strength  Deltoids Triceps Biceps Wrist Extension Wrist Flexion Hand    Upper: R 5/5 5/5 5/5 5/5 5/5 5/5    L 5/5 5/5 5/5 5/5 5/5 5/5     Iliopsoas Quadriceps Knee  Flexion Tibialis  anterior Gastro- cnemius EHL    Lower: R 3/5 5/5 5/5 4+/5 4/5 5/5    L 4/5 5/5 5/5 5/5 5/5 5/5     Tender to palpation bilateral iliac crest. No ecchymosis noted.   Clonus: absent    Dressing c/d/i        Significant Labs:  Recent Labs   Lab 06/03/22  0737   *   *   K 4.1      CO2 22*   BUN 18   CREATININE 1.2   CALCIUM 9.0       Recent Labs   Lab 06/03/22  0737   WBC 8.79   HGB 12.8*   HCT 38.2*          No results for input(s): LABPT, INR, APTT in the last 48 hours.  Microbiology Results (last 7 days)       ** No results found for the last 168 hours. **          All pertinent labs from the last 24 hours have been reviewed.    Significant Diagnostics:  I have reviewed all pertinent imaging results/findings within the past 24 hours.  X-Ray Lumbar Spine Ap And Lateral    Result Date: 6/4/2022  No acute abnormality. Electronically signed by: Jeanmarie Burch MD Date:    06/04/2022 Time:    14:43         Assessment/Plan:     DDD (degenerative disc disease), lumbar   46-year-old male who is a worker's comp patient with history of of hurting his back lifting a hot water heater in 2020. He has been experiencing significant intractable back pain and radiculopathy and has failed conservative treatment. He continues with lateral right leg pain and weakness in his left foot. Denies new bowel or bladder incontinence. Pt is now s/p L4-5 TLIF with L5-S1 posterior fixation 6/2.       -- Neuro stable post op, pain improved   - Q4h neuro checks   --CT Lsp 6/3 with small amount of bone matrix and mild narrowing R L5 ventrolateral aspect of central canal.   - Post op XRs satisfactory  --HOB flat due to intra-op CSF leak until 6/4; completed.  -- Pain control:  Oxycodone 5/10mg, Acetaminophen 1000mg Q6H, Gabapentin 600mg TID,   Added Dexamethasone 4mg IV, Lidocaine 5% patches,Morphine 5mg IV    -- PT/OT OOB  --LSO brace when up and OOB   -- -DVT prophylaxis: JOÃO's, SQH  -Bowel regimen: senna BID and miralax daily  -Atelectasis prevention:  IS hourly while awake, PT/OT    Dispo: IPR               Tre Kruse MD  Neurosurgery  [facility] - Neurosurgery ([facility])

## 2022-06-05 NOTE — PLAN OF CARE
Problem: Adult Inpatient Plan of Care  Goal: Plan of Care Review  Outcome: Ongoing, Progressing  Flowsheets (Taken 6/5/2022 0319)  Plan of Care Reviewed With: patient  Goal: Optimal Comfort and Wellbeing  Outcome: Ongoing, Progressing     Problem: Pain Acute  Goal: Acceptable Pain Control and Functional Ability  Outcome: Ongoing, Progressing     Problem: Skin Injury Risk Increased  Goal: Skin Health and Integrity  Outcome: Ongoing, Progressing    POC reviewed with patient, verbalized understanding. AAOX4, VSS. Continuous O2 monitoring ongoing. Patient constipated with last BM 6/1. He refused his suppository on day shift;. Miralax added to bowel regimen. Pain managed with current pain medication regimen. LSO brace when OOB. Post-op Xray completed on day shift. Fall precautions maintained; bed locked and in lowest position. Side rails up and locked x2. Bed alarm set and audible. Call light and personal belongings within reach. All questions and concerns addressed, see flow sheet for full assessment and V/S info.  Will continue to monitor.

## 2022-06-05 NOTE — SUBJECTIVE & OBJECTIVE
Interval History: 6/5: NAEON. AFVSS. Exam stable. Pain controlled. Tolerating PO. Voiding. Sat in bedside chair yesterday.     Medications:  Continuous Infusions:  Scheduled Meds:   acetaminophen  1,000 mg Oral Q6H    bisacodyL  10 mg Rectal Daily    DULoxetine  30 mg Oral Daily    gabapentin  600 mg Oral TID    heparin (porcine)  5,000 Units Subcutaneous Q8H    LIDOcaine  2 patch Transdermal Q24H    methocarbamoL  500 mg Oral QID    polyethylene glycol  17 g Oral BID     PRN Meds:acetaminophen, diazePAM, morphine, oxyCODONE, oxyCODONE, senna-docusate 8.6-50 mg     Review of Systems  Objective:     Weight: 97.5 kg (215 lb 0.2 oz)  Body mass index is 30.85 kg/m².  Vital Signs (Most Recent):  Temp: 98.3 °F (36.8 °C) (06/05/22 0418)  Pulse: 80 (06/05/22 0418)  Resp: 17 (06/05/22 0554)  BP: 130/87 (06/05/22 0418)  SpO2: 96 % (06/05/22 0418) Vital Signs (24h Range):  Temp:  [98.3 °F (36.8 °C)-99.3 °F (37.4 °C)] 98.3 °F (36.8 °C)  Pulse:  [80-90] 80  Resp:  [16-20] 17  SpO2:  [91 %-97 %] 96 %  BP: (121-143)/(75-94) 130/87                     Male External Urinary Catheter 06/02/22 1955 (Active)   Collection Container Urimeter 06/02/22 2100   Securement Method secured to top of thigh w/ adhesive device 06/02/22 2100   Skin no redness;no breakdown 06/02/22 2100   Tolerance no signs/symptoms of discomfort 06/02/22 2100   Output (mL) 250 mL 06/02/22 2100   Catheter Change Date 06/04/22 06/02/22 2100   Catheter Change Time 2000 06/02/22 2100       Physical Exam    Neurosurgery Physical Exam  General: well developed, well nourished, no distress.   Head: normocephalic, atraumatic  Neurologic: Alert and oriented. Thought content appropriate.  GCS: Motor: 6/Verbal: 5/Eyes: 4 GCS Total: 15  Mental Status: Awake, Alert, Oriented x3  Cranial nerves: face symmetric, tongue midline, CN II-XII grossly intact.   Eyes: pupils equal, round, reactive to light with accomodation, EOMI.    Sensory: intact to light touch throughout  Motor  Strength:Moves all extremities spontaneously with good tone. No abnormal movements seen. Pain limited hip flexor weakness.     Strength  Deltoids Triceps Biceps Wrist Extension Wrist Flexion Hand    Upper: R 5/5 5/5 5/5 5/5 5/5 5/5    L 5/5 5/5 5/5 5/5 5/5 5/5     Iliopsoas Quadriceps Knee  Flexion Tibialis  anterior Gastro- cnemius EHL   Lower: R 3/5 5/5 5/5 4+/5 4/5 5/5    L 4/5 5/5 5/5 5/5 5/5 5/5     Tender to palpation bilateral iliac crest. No ecchymosis noted.   Clonus: absent    Dressing c/d/i        Significant Labs:  Recent Labs   Lab 06/03/22  0737   *   *   K 4.1      CO2 22*   BUN 18   CREATININE 1.2   CALCIUM 9.0       Recent Labs   Lab 06/03/22  0737   WBC 8.79   HGB 12.8*   HCT 38.2*          No results for input(s): LABPT, INR, APTT in the last 48 hours.  Microbiology Results (last 7 days)       ** No results found for the last 168 hours. **          All pertinent labs from the last 24 hours have been reviewed.    Significant Diagnostics:  I have reviewed all pertinent imaging results/findings within the past 24 hours.  X-Ray Lumbar Spine Ap And Lateral    Result Date: 6/4/2022  No acute abnormality. Electronically signed by: Jeanmarie Burch MD Date:    06/04/2022 Time:    14:43

## 2022-06-05 NOTE — ASSESSMENT & PLAN NOTE
46-year-old male who is a worker's comp patient with history of of hurting his back lifting a hot water heater in 2020. He has been experiencing significant intractable back pain and radiculopathy and has failed conservative treatment. He continues with lateral right leg pain and weakness in his left foot. Denies new bowel or bladder incontinence. Pt is now s/p L4-5 TLIF with L5-S1 posterior fixation 6/2.       -- Neuro stable post op, pain improved   - Q4h neuro checks   --CT Lsp 6/3 with small amount of bone matrix and mild narrowing R L5 ventrolateral aspect of central canal.   - Post op XRs satisfactory  --HOB flat due to intra-op CSF leak until 6/4; completed.  -- Pain control:  Oxycodone 5/10mg, Acetaminophen 1000mg Q6H, Gabapentin 600mg TID,   Added Dexamethasone 4mg IV, Lidocaine 5% patches,Morphine 5mg IV    -- PT/OT OOB  --LSO brace when up and OOB   -- -DVT prophylaxis: JOÃO's, SQH  -Bowel regimen: senna BID and miralax daily  -Atelectasis prevention: IS hourly while awake, PT/OT    Dispo: IPR

## 2022-06-05 NOTE — RESPIRATORY THERAPY
RAPID RESPONSE RESPIRATORY CHART CHECK       Chart check completed, call [phone] for further concerns or assistance.

## 2022-06-05 NOTE — PLAN OF CARE
Problem: Physical Therapy  Goal: Physical Therapy Goal  Description: Goals to be met by: 6/15/22     Patient will increase functional independence with mobility by performin. Supine to sit with Modified [place]  2. Sit to supine with Modified [place]  3. Sit to stand transfer with Modified [place]  4. Gait  x 100 feet with Stand-by Assistance using LRAD, if needed.   5. Stand for 5 minutes with Modified [place] using LRAD, if needed  6. Lower extremity exercise program x10 reps per handout, with independence    Outcome: Ongoing, Progressing     Initial evaluation completed. Patient tolerated assessment well. Established POC and goals. Patient would continue to benefit from skilled PT services in order to improve functional mobility independence.     Analia Valencia, PT, DPT  2022

## 2022-06-05 NOTE — PT/OT/SLP EVAL
Physical Therapy Evaluation      Patient Name:  Junior Garay   MRN:  [id number]    Recommendations:     Discharge Recommendations:  rehabilitation facility   Discharge Equipment Recommendations: walker, rolling   Barriers to discharge: Increased skilled assistance needed    Assessment:     Junior Garay is a 47 y.o. male admitted with a medical diagnosis of <principal problem not specified>.  He presents with the following impairments/functional limitations:  weakness, impaired endurance, impaired self care skills, impaired functional mobilty, impaired sensation, gait instability, impaired balance, decreased coordination, decreased upper extremity function, decreased lower extremity function, decreased safety awareness, pain, impaired cardiopulmonary response to activity . Patient tolerated session well. Patient with significant back and RLE pain throughout session limiting mobility. LSO donned throughout session. Patient is eager and motivated to improve. Patient is an excellent candidate for IPR due to having a qualifying diagnosis, high level of motivation to improve, appropriate support following discharge, and able to tolerate 3 hours of intensive therapy in order to achieve a greater level of mobility.   Patient will benefit from PT services to address the mentioned deficits in order to promote an improve functional mobility status. Patient is currently functioning below PLOF Patient would require increased assistance should they discharge home . Upon d/c, recommendation inpatient rehab  for Junior Garay in order to progress towards an improved level of functional mobility independence.     Rehab Prognosis: Good; patient would benefit from acute skilled PT services to address these deficits and reach maximum level of function.    Recent Surgery: Procedure(s) (LRB):  FUSION, SPINE, LUMBAR, TLIF, MINIMALLY INVASIVE L4-5, L5-S1 (N/A) 3 Days Post-Op    Plan:     During this  "hospitalization, patient to be seen 4 x/week to address the identified rehab impairments via gait training, therapeutic activities, therapeutic exercises, neuromuscular re-education and progress toward the following goals:    · Plan of Care Expires:  07/05/22    History:     Past Medical History:   Diagnosis Date    Anxiety     Depression     related to current illness    Seizures     Had seizures as a child       Past Surgical History:   Procedure Laterality Date    ABDOMINAL SURGERY      APPENDECTOMY      BACK SURGERY      COLON SURGERY      MINIMALLY INVASIVE TRANSFORAMINAL LUMBAR INTERBODY FUSION (TLIF) N/A 6/2/2022    Procedure: FUSION, SPINE, LUMBAR, TLIF, MINIMALLY INVASIVE L4-5, L5-S1;  Surgeon: Humberto Rhodes MD;  Location: [facility] OR [address] [place] [address];  Service: Neurosurgery;  Laterality: N/A;  [place] Table 4 poster, prone, neuromonitoring, globus robot, Heaven Giron     TRANSFORAMINAL EPIDURAL INJECTION OF STEROID Right 07/12/2021    Procedure: LUMBAR TRANSFORAMINAL RIGHT L4/5 DIRECT REFERRAL NEEDS CONSENT;  Surgeon: Ramya Lundy MD;  [facility];  Service: Pain Management;  Laterality: Right;       Subjective     Chief Complaint: pain   Patient/Family Comments/goals: "I'm a fighter, I want to get better."  Pain/Comfort:  · Pain Rating 1: 10/10  · Location 1:  (back and RLE)  · Pain Addressed 1: Reposition, Pre-medicate for activity, Cessation of Activity, Distraction  · Pain Rating Post-Intervention 1: 10/10    Patients cultural, spiritual, Cheondoism conflicts given the current situation: no    Living Environment:  Patient's living environment is as follows:  Home type home    1 or 2 stories  single-story    Number of NEVA/ rails 0 NEVA    AD used?/Owned?  hurrycane   Bathroom set-up  tub/shower combo   Working? No   Driving? No   Individuals living with patient:  Sister    Hobbies/Roles: None stated    Prior to admission, patients level of function was assist from fiance for ADLs due to pain; " "Mod(I) for mobility with hurrycane. Patient reports "a couple" of falls. Equipment used at home:  (hurQuantum Dielectrricscane).  DME owned (not currently used): none.  Upon discharge, patient will have assistance from family and fiance.  Objective:   Communicated with RN prior to session.  Patient found up in chair with telemetry, peripheral IV, pulse ox (continuous)  upon PT entry to room. See below for detailed functional assessment. Patient agreeable to participate in initial evaluation.    General Precautions: Standard, fall   Orthopedic Precautions:spinal precautions   Braces: LSO     Vitals:    06/05/22 1300   BP: 125/80   Pulse: 79   Resp: (!) 22   Temp: 97.2 °F (36.2 °C)       Exams:  · Cognitive Exam:  Patient is oriented to Person, Place, Time, Situation  · Patient follows 100% of one -step commands   · Fine Motor Coordination:  Test:   Comments    Rapid ankle DF/PF  Intact     · Postural Exam:  Patient presented with the following abnormalities:    · -       Rounded shoulders  · -       Forward head  · Sensation:    LEFT LE: Intact light touch to BLEs RIGHT LE: Intact light touch to BLEs   *reports significant pain in RLE      ROM and Strength   Right Lower Extremity  Left Lower Extremity    Hip Flexion (Iliopsoas)  3+/5 WFL   Knee Extension (Quadriceps) WFL WFL   Knee Flexion (Hamstrings) WFL WFL   Ankle DF (Ant. Tib) WFL WFL   Ankle PF (Gastrocnemius)  WFL WFL     Functional Mobility:  · Bed Mobility:    · Rolling Right: stand by assistance  · Scooting: stand by assistance  · Sit to Supine: minimum assistance for LE management and log rolling technique  · Transfers:     · Sit to Stand:  minimum assistance with rolling walker   · 2 attempts needed from bedside chair   · vc's to anterior shift hips to assist with ascent   · Gait: 40ft with CGA and RW   · Significantly decreased hayley, mildly unsteady, increased WBing on BUE montse with RLE stance phase   · decreased speed, step-to gait pattern , forward flexed posture, " increased cervical flexion , antalgic gait pattern due to pain and decreased hip extension   ·     Balance:    Level of assist   Static Sitting  (I)   Dynamic Sitting  (I)   Static Standing  CGA   Dynamic Standing  CGA     Therapeutic Activities and Education:  -Patient educated on the role and goal of PT services during acute care LOS. Question and concerns acknowledged and answered as appropriate.   -Will continue to educated as needed.      - Educated on the importance of OOB mobility within safe range in order to decrease adverse effects of prolonged bedrest.        - Patient encouraged to get OOBTC 3x daily with assistance  -White board updated in patients room to reflect level of assistance needed with nursing.       - Patient is clear to ambulate to/from bathroom with RN/PCT, assist x1 (CGA + RW) for OOB mobility with RN.     AM-PAC 6 CLICK MOBILITY  Total Score:18     Patient left HOB elevated with all lines intact, call button in reach and RN notified.  White board updated in patient room to reflect level of function with nursing.     GOALS:   Multidisciplinary Problems     Physical Therapy Goals        Problem: Physical Therapy    Goal Priority Disciplines Outcome Goal Variances Interventions   Physical Therapy Goal     PT, PT/OT Ongoing, Progressing     Description: Goals to be met by: 6/15/22     Patient will increase functional independence with mobility by performin. Supine to sit with Modified [place]  2. Sit to supine with Modified [place]  3. Sit to stand transfer with Modified [place]  4. Gait  x 100 feet with Stand-by Assistance using LRAD, if needed.   5. Stand for 5 minutes with Modified [place] using LRAD, if needed  6. Lower extremity exercise program x10 reps per handout, with independence                     Time Tracking:     PT Received On: 22  PT Start Time: 1125     PT Stop Time: 1200  PT Total Time (min): 35 min     Billable Minutes: Evaluation 10 and  Gait Training 25      Analia Valencia, PT  06/05/2022

## 2022-06-06 PROCEDURE — 25000003 PHARM REV CODE 250: Performed by: NEUROLOGICAL SURGERY

## 2022-06-06 PROCEDURE — 25000003 PHARM REV CODE 250: Performed by: PHYSICIAN ASSISTANT

## 2022-06-06 PROCEDURE — 99024 POSTOP FOLLOW-UP VISIT: CPT | Mod: ,,, | Performed by: PHYSICIAN ASSISTANT

## 2022-06-06 PROCEDURE — 99024 PR POST-OP FOLLOW-UP VISIT: ICD-10-PCS | Mod: ,,, | Performed by: PHYSICIAN ASSISTANT

## 2022-06-06 PROCEDURE — 25000003 PHARM REV CODE 250: Performed by: STUDENT IN AN ORGANIZED HEALTH CARE EDUCATION/TRAINING PROGRAM

## 2022-06-06 PROCEDURE — 63600175 PHARM REV CODE 636 W HCPCS: Performed by: PHYSICIAN ASSISTANT

## 2022-06-06 PROCEDURE — 63600175 PHARM REV CODE 636 W HCPCS: Performed by: STUDENT IN AN ORGANIZED HEALTH CARE EDUCATION/TRAINING PROGRAM

## 2022-06-06 PROCEDURE — 11000001 HC ACUTE MED/SURG PRIVATE ROOM

## 2022-06-06 RX ORDER — ONDANSETRON 2 MG/ML
4 INJECTION INTRAMUSCULAR; INTRAVENOUS EVERY 4 HOURS PRN
Status: DISCONTINUED | OUTPATIENT
Start: 2022-06-06 | End: 2022-06-09 | Stop reason: HOSPADM

## 2022-06-06 RX ORDER — BACITRACIN 500 [USP'U]/G
OINTMENT TOPICAL 3 TIMES DAILY
Status: DISCONTINUED | OUTPATIENT
Start: 2022-06-06 | End: 2022-06-06

## 2022-06-06 RX ORDER — AMOXICILLIN 250 MG
1 CAPSULE ORAL DAILY
Status: DISCONTINUED | OUTPATIENT
Start: 2022-06-07 | End: 2022-06-09 | Stop reason: HOSPADM

## 2022-06-06 RX ORDER — BACITRACIN 500 [USP'U]/G
OINTMENT TOPICAL 2 TIMES DAILY
Status: DISCONTINUED | OUTPATIENT
Start: 2022-06-06 | End: 2022-06-09 | Stop reason: HOSPADM

## 2022-06-06 RX ADMIN — DULOXETINE 30 MG: 30 CAPSULE, DELAYED RELEASE ORAL at 09:06

## 2022-06-06 RX ADMIN — ACETAMINOPHEN 1000 MG: 500 TABLET ORAL at 08:06

## 2022-06-06 RX ADMIN — OXYCODONE HYDROCHLORIDE 10 MG: 10 TABLET ORAL at 04:06

## 2022-06-06 RX ADMIN — HEPARIN SODIUM 5000 UNITS: 5000 INJECTION INTRAVENOUS; SUBCUTANEOUS at 02:06

## 2022-06-06 RX ADMIN — ACETAMINOPHEN 1000 MG: 500 TABLET ORAL at 10:06

## 2022-06-06 RX ADMIN — HEPARIN SODIUM 5000 UNITS: 5000 INJECTION INTRAVENOUS; SUBCUTANEOUS at 09:06

## 2022-06-06 RX ADMIN — POLYETHYLENE GLYCOL 3350 17 G: 17 POWDER, FOR SOLUTION ORAL at 09:06

## 2022-06-06 RX ADMIN — LIDOCAINE 2 PATCH: 50 PATCH CUTANEOUS at 02:06

## 2022-06-06 RX ADMIN — BACITRACIN: 500 OINTMENT TOPICAL at 09:06

## 2022-06-06 RX ADMIN — METHOCARBAMOL 500 MG: 500 TABLET ORAL at 09:06

## 2022-06-06 RX ADMIN — ONDANSETRON 4 MG: 2 INJECTION INTRAMUSCULAR; INTRAVENOUS at 05:06

## 2022-06-06 RX ADMIN — HEPARIN SODIUM 5000 UNITS: 5000 INJECTION INTRAVENOUS; SUBCUTANEOUS at 05:06

## 2022-06-06 RX ADMIN — BACITRACIN: 500 OINTMENT TOPICAL at 02:06

## 2022-06-06 RX ADMIN — GABAPENTIN 600 MG: 300 CAPSULE ORAL at 09:06

## 2022-06-06 RX ADMIN — ACETAMINOPHEN 1000 MG: 500 TABLET ORAL at 02:06

## 2022-06-06 NOTE — PLAN OF CARE
Asif Pugh - Neurosurgery ([facility])  Discharge Reassessment    Primary Care Provider: Mark Littlejohn MD    Expected Discharge Date: 6/5/2022    Patient is not medically ready for discharge.  Patient rec is Rehab.  Referrals sent.    Interval History: NAEON. VSS. Pt with nausea and HA with standing this am. BP remains stable. PRN zofran given. Pt denies positional HA. Reports the pain medication is contributing to his nausea but he continues to report right hip pain that radiates into his thigh on the right. He has been OOB with walker to the bathroom this am. Denies new numbness or paresthesias.     Reassessment (most recent)     Discharge Reassessment - 06/06/22 1022        Discharge Reassessment    Assessment Type Discharge Planning Reassessment     Did the patient's condition or plan change since previous assessment? Yes     Discharge Plan discussed with: Patient     Communicated ODIN with patient/caregiver Date not available/Unable to determine     Discharge Plan A Rehab     Discharge Plan B Home with family;Home Health     DME Needed Upon Discharge  none     Discharge Barriers Identified None     Why the patient remains in the hospital Requires continued medical care        Post-Acute Status    Post-Acute Authorization Placement     Post-Acute Placement Status Referrals Sent     Discharge Delays None known at this time

## 2022-06-06 NOTE — SUBJECTIVE & OBJECTIVE
Interval History: NAEON. VSS. Pt with nausea and HA with standing this am. BP remains stable. PRN zofran given. Pt denies positional HA. Reports the pain medication is contributing to his nausea but he continues to report right hip pain that radiates into his thigh on the right. He has been OOB with walker to the bathroom this am. Denies new numbness or paresthesias.     Medications:  Continuous Infusions:  Scheduled Meds:   acetaminophen  1,000 mg Oral Q6H    bisacodyL  10 mg Rectal Daily    DULoxetine  30 mg Oral Daily    gabapentin  600 mg Oral TID    heparin (porcine)  5,000 Units Subcutaneous Q8H    LIDOcaine  2 patch Transdermal Q24H    methocarbamoL  500 mg Oral QID    polyethylene glycol  17 g Oral BID    [START ON 6/7/2022] senna-docusate 8.6-50 mg  1 tablet Oral Daily     PRN Meds:acetaminophen, diazePAM, morphine, ondansetron, oxyCODONE     Review of Systems  Objective:     Weight: 97.5 kg (215 lb 0.2 oz)  Body mass index is 30.85 kg/m².  Vital Signs (Most Recent):  Temp: 97.8 °F (36.6 °C) (06/06/22 0824)  Pulse: 79 (06/06/22 0824)  Resp: 19 (06/06/22 0824)  BP: (!) 143/82 (06/06/22 0824)  SpO2: 96 % (06/06/22 0824) Vital Signs (24h Range):  Temp:  [96.5 °F (35.8 °C)-98.4 °F (36.9 °C)] 97.8 °F (36.6 °C)  Pulse:  [70-84] 79  Resp:  [16-22] 19  SpO2:  [89 %-99 %] 96 %  BP: (117-143)/(75-87) 143/82     Date 06/06/22 0700 - 06/07/22 0659   Shift 5827-8084 6825-5408 1207-5341 24 Hour Total   INTAKE   P.O. 240   240   Shift Total(mL/kg) 240(2.5)   240(2.5)   OUTPUT   Shift Total(mL/kg)       Weight (kg) 97.5 97.5 97.5 97.5                   Male External Urinary Catheter 06/02/22 1955 (Active)   Collection Container Urimeter 06/02/22 2100   Securement Method secured to top of thigh w/ adhesive device 06/02/22 2100   Skin no redness;no breakdown 06/02/22 2100   Tolerance no signs/symptoms of discomfort 06/02/22 2100   Output (mL) 250 mL 06/02/22 2100   Catheter Change Date 06/04/22 06/02/22 2100   Catheter  Change Time 2000 06/02/22 2100       Neurosurgery Physical Exam  General: well developed, well nourished, no distress.   Head: normocephalic, atraumatic  Neurologic: Alert and oriented. Thought content appropriate.  GCS: Motor: 6/Verbal: 5/Eyes: 4 GCS Total: 15  Mental Status: Awake, Alert, Oriented x3  Cranial nerves: face symmetric, tongue midline, CN II-XII grossly intact.   Eyes: pupils equal, round, reactive to light with accomodation, EOMI.    Sensory: intact to light touch throughout  Motor Strength:Moves all extremities spontaneously with good tone. No abnormal movements seen. Pain limited RLE exam     Strength  Deltoids Triceps Biceps Wrist Extension Wrist Flexion Hand    Upper: R 5/5 5/5 5/5 5/5 5/5 5/5    L 5/5 5/5 5/5 5/5 5/5 5/5     Iliopsoas Quadriceps Knee  Flexion Tibialis  anterior Gastro- cnemius EHL   Lower: R 3/5 4/5 4/5 4+/5 4/5 5/5    L 4/5 5/5 5/5 5/5 5/5 5/5     Clonus: absent  Incisions clean dry and intact with skin edges well approximated with staples.   Right GTB TTP, pain with right hip ROM        Significant Labs:  No results for input(s): GLU, NA, K, CL, CO2, BUN, CREATININE, CALCIUM, MG in the last 48 hours.    No results for input(s): WBC, HGB, HCT, PLT in the last 48 hours.    No results for input(s): LABPT, INR, APTT in the last 48 hours.  Microbiology Results (last 7 days)       ** No results found for the last 168 hours. **          All pertinent labs from the last 24 hours have been reviewed.    Significant Diagnostics:  I have reviewed all pertinent imaging results/findings within the past 24 hours.  X-Ray Lumbar Spine Ap And Lateral    Result Date: 6/4/2022  No acute abnormality. Electronically signed by: Jeanmarie Burch MD Date:    06/04/2022 Time:    14:43

## 2022-06-06 NOTE — PLAN OF CARE
Remains oriented X4. Pt drowsy and nauseated this am. Was able to take am meds, but was not able to eat BF and lunch. States does not want Zofran. Requested Robaxin and Neurontin held this afternoon. Also wants to hold Oxycodone for now. Pt alert this afternoon. Ambulated to BR and back using walker. RLE weaker than LLE. Hip x-ray ordered completed. States pain is decreased when he lies on his side and rests better. Pt lying on side this afternoon. Afebrile. Incision X2 to lower back with staples. Dressing removed this am by PA. Bacitracin applied to both incisions as ordered. No bleeding noted. Last BM yesterday. Took Miralax this am, but preferred not to take Dulcolax supp. Continuing to monitor.

## 2022-06-06 NOTE — PROGRESS NOTES
Asif Pugh - Neurosurgery ([facility])  Neurosurgery  Progress Note    Subjective:     History of Present Illness: This is a 46-year-old male who is a worker's comp patient who we have been following for some time now.  Patient had a history of of hurting his back lifting a hot water heater in 2020. He has been experiencing significant intractable back pain and radiculopathy and has failed physical therapy, gabapentin, Medrol Dosepak, pain medication as well as epidural and transforaminal injections.  Unfortunately nothing has really helped.  His pain continues to get worse and now impairing his daily living and impacting his ability to return to work.  His EMG does show active denervation of L5 and S1. He was scheduled for lumbar TLIF after his last visit but this was denied. He was referred for a second opinion through worker's comp as they did not feel he qualified for surgery at that time. The physician recommended that he obtain a discogram for further evaluation of his pain. His discogram was positive at L4-5, L5-S1 for DDD and severe concordant pain with injections at these levels. There was a normal control at L2-3. He denies any change in his back pain since his last visit. He continues with lateral right leg pain and weakness in his left foot. He denies new bowel or bladder incontinence.  He also continues wtih right knee pain and has been walking with a walker. he does have swelling, guarding and pain with palpation to his right knee.      Post-Op Info:  Procedure(s) (LRB):  FUSION, SPINE, LUMBAR, TLIF, MINIMALLY INVASIVE L4-5, L5-S1 (N/A)   4 Days Post-Op     Interval History: NAEON. VSS. Pt with nausea and HA with standing this am. BP remains stable. PRN zofran given. Pt denies positional HA. Reports the pain medication is contributing to his nausea but he continues to report right hip pain that radiates into his thigh on the right. He has been OOB with walker to the bathroom this am. Denies new numbness or  paresthesias.     Medications:  Continuous Infusions:  Scheduled Meds:   acetaminophen  1,000 mg Oral Q6H    bisacodyL  10 mg Rectal Daily    DULoxetine  30 mg Oral Daily    gabapentin  600 mg Oral TID    heparin (porcine)  5,000 Units Subcutaneous Q8H    LIDOcaine  2 patch Transdermal Q24H    methocarbamoL  500 mg Oral QID    polyethylene glycol  17 g Oral BID    [START ON 6/7/2022] senna-docusate 8.6-50 mg  1 tablet Oral Daily     PRN Meds:acetaminophen, diazePAM, morphine, ondansetron, oxyCODONE     Review of Systems  Objective:     Weight: 97.5 kg (215 lb 0.2 oz)  Body mass index is 30.85 kg/m².  Vital Signs (Most Recent):  Temp: 97.8 °F (36.6 °C) (06/06/22 0824)  Pulse: 79 (06/06/22 0824)  Resp: 19 (06/06/22 0824)  BP: (!) 143/82 (06/06/22 0824)  SpO2: 96 % (06/06/22 0824) Vital Signs (24h Range):  Temp:  [96.5 °F (35.8 °C)-98.4 °F (36.9 °C)] 97.8 °F (36.6 °C)  Pulse:  [70-84] 79  Resp:  [16-22] 19  SpO2:  [89 %-99 %] 96 %  BP: (117-143)/(75-87) 143/82     Date 06/06/22 0700 - 06/07/22 0659   Shift 4244-3822 5382-7887 8590-5554 24 Hour Total   INTAKE   P.O. 240   240   Shift Total(mL/kg) 240(2.5)   240(2.5)   OUTPUT   Shift Total(mL/kg)       Weight (kg) 97.5 97.5 97.5 97.5                   Male External Urinary Catheter 06/02/22 1955 (Active)   Collection Container Urimeter 06/02/22 2100   Securement Method secured to top of thigh w/ adhesive device 06/02/22 2100   Skin no redness;no breakdown 06/02/22 2100   Tolerance no signs/symptoms of discomfort 06/02/22 2100   Output (mL) 250 mL 06/02/22 2100   Catheter Change Date 06/04/22 06/02/22 2100   Catheter Change Time 2000 06/02/22 2100       Neurosurgery Physical Exam  General: well developed, well nourished, no distress.   Head: normocephalic, atraumatic  Neurologic: Alert and oriented. Thought content appropriate.  GCS: Motor: 6/Verbal: 5/Eyes: 4 GCS Total: 15  Mental Status: Awake, Alert, Oriented x3  Cranial nerves: face symmetric, tongue midline,  CN II-XII grossly intact.   Eyes: pupils equal, round, reactive to light with accomodation, EOMI.    Sensory: intact to light touch throughout  Motor Strength:Moves all extremities spontaneously with good tone. No abnormal movements seen. Pain limited RLE exam     Strength  Deltoids Triceps Biceps Wrist Extension Wrist Flexion Hand    Upper: R 5/5 5/5 5/5 5/5 5/5 5/5    L 5/5 5/5 5/5 5/5 5/5 5/5     Iliopsoas Quadriceps Knee  Flexion Tibialis  anterior Gastro- cnemius EHL   Lower: R 3/5 4/5 4/5 4+/5 4/5 5/5    L 4/5 5/5 5/5 5/5 5/5 5/5     Clonus: absent  Incisions clean dry and intact with skin edges well approximated with staples.   Right GTB TTP, pain with right hip ROM        Significant Labs:  No results for input(s): GLU, NA, K, CL, CO2, BUN, CREATININE, CALCIUM, MG in the last 48 hours.    No results for input(s): WBC, HGB, HCT, PLT in the last 48 hours.    No results for input(s): LABPT, INR, APTT in the last 48 hours.  Microbiology Results (last 7 days)       ** No results found for the last 168 hours. **          All pertinent labs from the last 24 hours have been reviewed.    Significant Diagnostics:  I have reviewed all pertinent imaging results/findings within the past 24 hours.  X-Ray Lumbar Spine Ap And Lateral    Result Date: 6/4/2022  No acute abnormality. Electronically signed by: Jeanmarie Burch MD Date:    06/04/2022 Time:    14:43         Assessment/Plan:     DDD (degenerative disc disease), lumbar   46-year-old male who is a worker's comp patient with history of of hurting his back lifting a hot water heater in 2020. He has been experiencing significant intractable back pain and radiculopathy and has failed conservative treatment. He continues with lateral right leg pain and weakness in his left foot. Denies new bowel or bladder incontinence. Pt is now s/p L4-5 TLIF with L5-S1 posterior fixation 6/2.     -- Neuro stable post op, pain improved   - Q4h neuro checks   --CT Lsp 6/3 with small  amount of bone matrix and mild narrowing R L5 ventrolateral aspect of central canal.   --Post op XRs satisfactory. Will obtain right hip xray today given persistent hip pain post-op  --HOB flat due to intra-op CSF leak until 6/4; completed. Continue to monitor for positional HA  --Pain control:  Oxycodone 5mg q4h prn, valium q6h prn muscle spasms, Acetaminophen 1000mg Q6H, Gabapentin 600mg TID, Lidocaine 5% patches, Morphine 2mg IV  For breakthrough pain  --PRN zofran for nausea   --PT/OT/OOB  --LSO brace when up and OOB    --DVT prophylaxis: JOÃO's, SQH  --Bowel regimen: senna BID and miralax daily  --Atelectasis prevention: IS hourly while awake, PT/OT  --Discussed with Dr. Rhodes    Dispo: IPR pending                Catalina Wolff PA-C  Neurosurgery  As[facility] Pugh - Neurosurgery ([facility])

## 2022-06-06 NOTE — ASSESSMENT & PLAN NOTE
46-year-old male who is a worker's comp patient with history of of hurting his back lifting a hot water heater in 2020. He has been experiencing significant intractable back pain and radiculopathy and has failed conservative treatment. He continues with lateral right leg pain and weakness in his left foot. Denies new bowel or bladder incontinence. Pt is now s/p L4-5 TLIF with L5-S1 posterior fixation 6/2.     -- Neuro stable post op, pain improved   - Q4h neuro checks   --CT Lsp 6/3 with small amount of bone matrix and mild narrowing R L5 ventrolateral aspect of central canal.   --Post op XRs satisfactory. Will obtain right hip xray today given persistent hip pain post-op  --HOB flat due to intra-op CSF leak until 6/4; completed. Continue to monitor for positional HA  --Pain control:  Oxycodone 5mg q4h prn, valium q6h prn muscle spasms, Acetaminophen 1000mg Q6H, Gabapentin 600mg TID, Lidocaine 5% patches, Morphine 2mg IV  For breakthrough pain  --PRN zofran for nausea   --PT/OT/OOB  --LSO brace when up and OOB    --DVT prophylaxis: JOÃO's, SQH  --Bowel regimen: senna BID and miralax daily  --Atelectasis prevention: IS hourly while awake, PT/OT  --Discussed with Dr. Rhodes    Dispo: IPR pending

## 2022-06-06 NOTE — PLAN OF CARE
Problem: Adult Inpatient Plan of Care  Goal: Plan of Care Review  Outcome: Ongoing, Progressing  Flowsheets (Taken 6/6/2022 0721)  Plan of Care Reviewed With: patient  Goal: Optimal Comfort and Wellbeing  Outcome: Ongoing, Progressing     Problem: Pain Acute  Goal: Acceptable Pain Control and Functional Ability  Outcome: Ongoing, Progressing  Intervention: Develop Pain Management Plan  Flowsheets (Taken 6/6/2022 0721)  Pain Management Interventions:   care clustered   pain management plan reviewed with patient/caregiver  Intervention: Prevent or Manage Pain  Flowsheets (Taken 6/6/2022 0721)  Medication Review/Management: medications reviewed     Problem: Skin Injury Risk Increased  Goal: Skin Health and Integrity  Outcome: Ongoing, Progressing   POC reviewed with patient, verbalized understanding. AAOX4, VSS. On 2 L NC while asleep as patient desates to the 80s.  Continuous O2 monitoring ongoing. Pain managed with current pain medication regimen. LSO brace when OOB. patient sat in the recliner for few hours last night. Fall precautions maintained; bed locked and in lowest position. Side rails up and locked x2. Bed alarm set and audible. Call light and personal belongings within reach. All questions and concerns addressed, see flow sheet for full assessment and V/S info.  Will continue to monitor.

## 2022-06-07 PROBLEM — Z74.09 IMPAIRED FUNCTIONAL MOBILITY AND ENDURANCE: Status: RESOLVED | Noted: 2022-06-07 | Resolved: 2022-06-07

## 2022-06-07 PROBLEM — Z74.09 IMPAIRED FUNCTIONAL MOBILITY AND ENDURANCE: Status: ACTIVE | Noted: 2022-06-07

## 2022-06-07 PROBLEM — M51.36 DDD (DEGENERATIVE DISC DISEASE), LUMBAR: Status: RESOLVED | Noted: 2021-07-12 | Resolved: 2022-06-07

## 2022-06-07 PROBLEM — M51.369 DDD (DEGENERATIVE DISC DISEASE), LUMBAR: Status: RESOLVED | Noted: 2021-07-12 | Resolved: 2022-06-07

## 2022-06-07 LAB
ALBUMIN SERPL BCP-MCNC: 2.8 G/DL (ref 3.5–5.2)
ALP SERPL-CCNC: 72 U/L (ref 55–135)
ALT SERPL W/O P-5'-P-CCNC: 57 U/L (ref 10–44)
ANION GAP SERPL CALC-SCNC: 10 MMOL/L (ref 8–16)
AST SERPL-CCNC: 49 U/L (ref 10–40)
BILIRUB SERPL-MCNC: 0.5 MG/DL (ref 0.1–1)
BUN SERPL-MCNC: 16 MG/DL (ref 6–20)
CALCIUM SERPL-MCNC: 9.5 MG/DL (ref 8.7–10.5)
CHLORIDE SERPL-SCNC: 97 MMOL/L (ref 95–110)
CO2 SERPL-SCNC: 27 MMOL/L (ref 23–29)
CREAT SERPL-MCNC: 0.9 MG/DL (ref 0.5–1.4)
EST. GFR  (AFRICAN AMERICAN): >60 ML/MIN/1.73 M^2
EST. GFR  (NON AFRICAN AMERICAN): >60 ML/MIN/1.73 M^2
GLUCOSE SERPL-MCNC: 100 MG/DL (ref 70–110)
POTASSIUM SERPL-SCNC: 4.3 MMOL/L (ref 3.5–5.1)
PROT SERPL-MCNC: 6.8 G/DL (ref 6–8.4)
SODIUM SERPL-SCNC: 134 MMOL/L (ref 136–145)

## 2022-06-07 PROCEDURE — 99024 POSTOP FOLLOW-UP VISIT: CPT | Mod: ,,, | Performed by: PHYSICIAN ASSISTANT

## 2022-06-07 PROCEDURE — 25000003 PHARM REV CODE 250: Performed by: NEUROLOGICAL SURGERY

## 2022-06-07 PROCEDURE — 80053 COMPREHEN METABOLIC PANEL: CPT | Performed by: PHYSICIAN ASSISTANT

## 2022-06-07 PROCEDURE — 25000003 PHARM REV CODE 250: Performed by: PHYSICIAN ASSISTANT

## 2022-06-07 PROCEDURE — 99024 PR POST-OP FOLLOW-UP VISIT: ICD-10-PCS | Mod: ,,, | Performed by: PHYSICIAN ASSISTANT

## 2022-06-07 PROCEDURE — 99900035 HC TECH TIME PER 15 MIN (STAT)

## 2022-06-07 PROCEDURE — 63600175 PHARM REV CODE 636 W HCPCS: Performed by: PHYSICIAN ASSISTANT

## 2022-06-07 PROCEDURE — 97116 GAIT TRAINING THERAPY: CPT

## 2022-06-07 PROCEDURE — 94761 N-INVAS EAR/PLS OXIMETRY MLT: CPT

## 2022-06-07 PROCEDURE — 36415 COLL VENOUS BLD VENIPUNCTURE: CPT | Performed by: PHYSICIAN ASSISTANT

## 2022-06-07 PROCEDURE — 94799 UNLISTED PULMONARY SVC/PX: CPT

## 2022-06-07 PROCEDURE — 63600175 PHARM REV CODE 636 W HCPCS: Performed by: STUDENT IN AN ORGANIZED HEALTH CARE EDUCATION/TRAINING PROGRAM

## 2022-06-07 PROCEDURE — 11000001 HC ACUTE MED/SURG PRIVATE ROOM

## 2022-06-07 RX ORDER — GABAPENTIN 300 MG/1
300 CAPSULE ORAL 3 TIMES DAILY
Status: DISCONTINUED | OUTPATIENT
Start: 2022-06-07 | End: 2022-06-09 | Stop reason: HOSPADM

## 2022-06-07 RX ADMIN — ACETAMINOPHEN 1000 MG: 500 TABLET ORAL at 08:06

## 2022-06-07 RX ADMIN — HEPARIN SODIUM 5000 UNITS: 5000 INJECTION INTRAVENOUS; SUBCUTANEOUS at 05:06

## 2022-06-07 RX ADMIN — LIDOCAINE 2 PATCH: 50 PATCH CUTANEOUS at 12:06

## 2022-06-07 RX ADMIN — ACETAMINOPHEN 1000 MG: 500 TABLET ORAL at 05:06

## 2022-06-07 RX ADMIN — ONDANSETRON 4 MG: 2 INJECTION INTRAMUSCULAR; INTRAVENOUS at 10:06

## 2022-06-07 RX ADMIN — BACITRACIN: 500 OINTMENT TOPICAL at 09:06

## 2022-06-07 RX ADMIN — BACITRACIN: 500 OINTMENT TOPICAL at 08:06

## 2022-06-07 NOTE — HOSPITAL COURSE
6/4: BM Min. Sit to stand min & rw. CGA with RW to ambulate ~20ft x 2 to door and back to EOB. UBD Min. Toilet TA.   06/05/2022: Bed mobility min-sba.  Sit to stand min & rw.  Ambulated 40 ft CGA & RW.

## 2022-06-07 NOTE — CONSULTS
Inpatient consult to Physical Medicine Rehab  Consult performed by: aTmara Crouch NP  Consult ordered by: Humberto Rhodes MD  Reason for consult: assess rehab needs        Reviewed patient history and current admission.  PM&R following.     MARIAA Hdz, FNP-C  Physical Medicine & Rehabilitation   06/07/2022

## 2022-06-07 NOTE — SUBJECTIVE & OBJECTIVE
Interval History: Pain is better controlled today, however he continues with nausea overnight. Denies emesis or positional HA. VSS. He did not receive much pain medication overnight. Hip Xray without acute process. IPR pending     Medications:  Continuous Infusions:  Scheduled Meds:   acetaminophen  1,000 mg Oral Q6H    bacitracin   Topical (Top) BID    bisacodyL  10 mg Rectal Daily    DULoxetine  30 mg Oral Daily    gabapentin  300 mg Oral TID    heparin (porcine)  5,000 Units Subcutaneous Q8H    LIDOcaine  2 patch Transdermal Q24H    methocarbamoL  500 mg Oral QID    polyethylene glycol  17 g Oral BID    senna-docusate 8.6-50 mg  1 tablet Oral Daily     PRN Meds:acetaminophen, diazePAM, morphine, ondansetron, oxyCODONE     Review of Systems  Objective:     Weight: 97.5 kg (215 lb 0.2 oz)  Body mass index is 30.85 kg/m².  Vital Signs (Most Recent):  Temp: 98 °F (36.7 °C) (06/07/22 0803)  Pulse: 60 (06/07/22 0803)  Resp: 18 (06/07/22 0803)  BP: 126/72 (06/07/22 0437)  SpO2: 96 % (06/07/22 0803) Vital Signs (24h Range):  Temp:  [97.1 °F (36.2 °C)-98.4 °F (36.9 °C)] 98 °F (36.7 °C)  Pulse:  [60-73] 60  Resp:  [18-20] 18  SpO2:  [94 %-99 %] 96 %  BP: (122-146)/(72-86) 126/72     Date 06/07/22 0700 - 06/08/22 0659   Shift 6021-4110 7954-8000 8224-7390 24 Hour Total   INTAKE   Shift Total(mL/kg)       OUTPUT   Urine(mL/kg/hr) 1000   1000   Shift Total(mL/kg) 1000(10.3)   1000(10.3)   Weight (kg) 97.5 97.5 97.5 97.5                     Male External Urinary Catheter 06/02/22 1955 (Active)   Collection Container Urimeter 06/02/22 2100   Securement Method secured to top of thigh w/ adhesive device 06/02/22 2100   Skin no redness;no breakdown 06/02/22 2100   Tolerance no signs/symptoms of discomfort 06/02/22 2100   Output (mL) 250 mL 06/02/22 2100   Catheter Change Date 06/04/22 06/02/22 2100   Catheter Change Time 2000 06/02/22 2100       Neurosurgery Physical Exam  General: well developed, well nourished, no  distress.   Head: normocephalic, atraumatic  Neurologic: Alert and oriented. Thought content appropriate.  GCS: Motor: 6/Verbal: 5/Eyes: 4 GCS Total: 15  Mental Status: Awake, Alert, Oriented x3  Cranial nerves: face symmetric, tongue midline, CN II-XII grossly intact.   Eyes: pupils equal, round, reactive to light with accomodation, EOMI.    Sensory: intact to light touch throughout  Motor Strength:Moves all extremities spontaneously with good tone. No abnormal movements seen. Pain limited RLE exam     Strength  Deltoids Triceps Biceps Wrist Extension Wrist Flexion Hand    Upper: R 5/5 5/5 5/5 5/5 5/5 5/5    L 5/5 5/5 5/5 5/5 5/5 5/5     Iliopsoas Quadriceps Knee  Flexion Tibialis  anterior Gastro- cnemius EHL   Lower: R 4-/5 4/5 4/5 4+/5 4/5 5/5    L 4/5 5/5 5/5 5/5 5/5 5/5     Clonus: absent  Incisions clean dry and intact with skin edges well approximated with staples.   GTB TTP on the right       Significant Labs:  No results for input(s): GLU, NA, K, CL, CO2, BUN, CREATININE, CALCIUM, MG in the last 48 hours.    No results for input(s): WBC, HGB, HCT, PLT in the last 48 hours.    No results for input(s): LABPT, INR, APTT in the last 48 hours.  Microbiology Results (last 7 days)       ** No results found for the last 168 hours. **          All pertinent labs from the last 24 hours have been reviewed.    Significant Diagnostics:  I have reviewed all pertinent imaging results/findings within the past 24 hours.  X-Ray Lumbar Spine Ap And Lateral    Result Date: 6/4/2022  No acute abnormality. Electronically signed by: Jeanmarie Burch MD Date:    06/04/2022 Time:    14:43

## 2022-06-07 NOTE — PROGRESS NOTES
PM&R Recommendation:     At this time, the PM&R team has reviewed this patient's ongoing medical case including inpatient diagnosis, medical history, clinical examination, labs, vitals, current social and functional history to provide the post-acute recommendation as follows:     RECOMMENDATIONS: Inpatient rehabilitation due to good motivation/participation with therapies and good potential for recovery. Of note, patient is requesting a rehab closer to home.     MEDICAL STABILITY:     At this time, barriers for post-acute rehab admission include: improvement in pain and nausea    MARIAA Perez, FNP-C  Physical Medicine & Rehabilitation   06/07/2022

## 2022-06-07 NOTE — PLAN OF CARE
Patient remains injury free. Patent tolerated PO; poor appetite due to nausea. Zofran per order administered nausea resolved. Patient c/o headache intermittent however refused tylenol. He also refused all medications  today. MD aware. Ambulate to bathroom with assist. No other issue or concerns.

## 2022-06-07 NOTE — SUBJECTIVE & OBJECTIVE
Past Medical History:   Diagnosis Date    Anxiety     Depression     related to current illness    Seizures     Had seizures as a child     Past Surgical History:   Procedure Laterality Date    ABDOMINAL SURGERY      APPENDECTOMY      BACK SURGERY      COLON SURGERY      MINIMALLY INVASIVE TRANSFORAMINAL LUMBAR INTERBODY FUSION (TLIF) N/A 6/2/2022    Procedure: FUSION, SPINE, LUMBAR, TLIF, MINIMALLY INVASIVE L4-5, L5-S1;  Surgeon: Humberto Rhodes MD;  Location: [facility] OR [address] [place] [address];  Service: Neurosurgery;  Laterality: N/A;  [place] Table 4 poster, prone, neuromonitoring, globus robot, [place]     TRANSFORAMINAL EPIDURAL INJECTION OF STEROID Right 07/12/2021    Procedure: LUMBAR TRANSFORAMINAL RIGHT L4/5 DIRECT REFERRAL NEEDS CONSENT;  Surgeon: Ramya Lundy MD;  Location: [facility];  Service: Pain Management;  Laterality: Right;     Review of patient's allergies indicates:  No Known Allergies    Scheduled Medications:    acetaminophen  1,000 mg Oral Q6H    bacitracin   Topical (Top) BID    bisacodyL  10 mg Rectal Daily    DULoxetine  30 mg Oral Daily    gabapentin  600 mg Oral TID    heparin (porcine)  5,000 Units Subcutaneous Q8H    LIDOcaine  2 patch Transdermal Q24H    methocarbamoL  500 mg Oral QID    polyethylene glycol  17 g Oral BID    senna-docusate 8.6-50 mg  1 tablet Oral Daily       PRN Medications: acetaminophen, diazePAM, morphine, ondansetron, oxyCODONE    Family History       Problem Relation (Age of Onset)    Cancer Mother    Heart attack Mother    Suicide Father          Tobacco Use    Smoking status: Former Smoker     Packs/day: 0.50     Years: 10.00     Pack years: 5.00     Types: Cigarettes    Smokeless tobacco: Never Used    Tobacco comment: Stopped 2 weeks ago   Substance and Sexual Activity    Alcohol use: No     Comment: socially- wedding or party    Drug use: No    Sexual activity: Not Currently     Partners: Female     Review of Systems   Constitutional:  Positive for activity change.  Negative for fatigue and fever.   HENT:  Negative for trouble swallowing and voice change.    Eyes:  Negative for photophobia and visual disturbance.   Respiratory:  Negative for cough and shortness of breath.    Cardiovascular:  Negative for chest pain and palpitations.   Gastrointestinal:  Positive for nausea. Negative for vomiting.   Genitourinary:  Negative for difficulty urinating and flank pain.   Musculoskeletal:  Positive for back pain and gait problem. Negative for myalgias.   Skin:  Negative for color change and rash.   Neurological:  Positive for weakness and headaches.   Psychiatric/Behavioral:  Negative for agitation and confusion.    Objective:     Vital Signs (Most Recent):  Temp: 98 °F (36.7 °C) (06/07/22 0803)  Pulse: 60 (06/07/22 0803)  Resp: 18 (06/07/22 0803)  BP: 126/72 (06/07/22 0437)  SpO2: 96 % (06/07/22 0803)    Vital Signs (24h Range):  Temp:  [97.1 °F (36.2 °C)-99.1 °F (37.3 °C)] 98 °F (36.7 °C)  Pulse:  [60-79] 60  Resp:  [18-20] 18  SpO2:  [94 %-99 %] 96 %  BP: (122-146)/(72-89) 126/72     Body mass index is 30.85 kg/m².    Physical Exam  Constitutional:       General: He is not in acute distress.     Appearance: He is well-developed. He is ill-appearing.   HENT:      Head: Normocephalic and atraumatic.   Eyes:      General:         Right eye: No discharge.         Left eye: No discharge.   Cardiovascular:      Rate and Rhythm: Normal rate.      Pulses: Normal pulses.   Pulmonary:      Effort: Pulmonary effort is normal. No respiratory distress.   Abdominal:      General: There is no distension.      Palpations: Abdomen is soft.   Musculoskeletal:         General: No deformity.      Cervical back: Neck supple.      Lumbar back: Tenderness and bony tenderness present. Decreased range of motion.   Skin:     General: Skin is warm and dry.   Neurological:      Mental Status: He is alert and oriented to person, place, and time.      Motor: Weakness present.      Comments: Follows commands     Psychiatric:         Behavior: Behavior is slowed. Behavior is cooperative.         Cognition and Memory: Cognition is not impaired.     NEUROLOGICAL EXAMINATION:     MENTAL STATUS   Oriented to person, place, and time.     Diagnostic Results:   Labs: Reviewed  X-Ray: Reviewed

## 2022-06-07 NOTE — ASSESSMENT & PLAN NOTE
-NSGY managing  -h/o back injury with active denervation of L5 and S1 s/p lifting a hot water heater in 2020  - discogram was positive at L4-5, L5-S1 for DDD and severe concordant pain with injections at these levels  - Now s/p L4-5 TLIF with L5-S1 posterior fixation 6/2 complicated by intra-op CSF leak

## 2022-06-07 NOTE — PLAN OF CARE
CM sent message via [facility] to [facility] to check status of referral.  [facility] is 1st choice.

## 2022-06-07 NOTE — PLAN OF CARE
"  Problem: Adult Inpatient Plan of Care  Goal: Plan of Care Review  Outcome: Ongoing, Progressing     Problem: Infection  Goal: Absence of Infection Signs and Symptoms  Outcome: Ongoing, Progressing     Problem: Pain Acute  Goal: Acceptable Pain Control and Functional Ability  Outcome: Ongoing, Progressing     Problem: Skin Injury Risk Increased  Goal: Skin Health and Integrity  Outcome: Ongoing, Progressing     Problem: Fall Injury Risk  Goal: Absence of Fall and Fall-Related Injury  Outcome: Ongoing, Progressing     Problem: Neurologic Impairment (Spinal Surgery)  Goal: Optimal Neurologic Function  Outcome: Ongoing, Progressing   Patient was difficult to rouse at beginning of there shift, refused all meds stating "they make me sleep" Up in bedside chair with call light in reach and O2 in place. Medicated with scheduled Tylenol , had a headache but Tylenol was effective. Moving in chair independently. Offered to assist him to bed numerous times but has declined. VSS, flow sheets completed see for assessments.   "

## 2022-06-07 NOTE — HPI
Per chart review, Junior Garay is a 47-year-old male with PMHx of back injury with active denervation of L5 and S1 s/p lifting a hot water heater in 2020.  He is followed in NSGY clinic failing conservative measures with discogram was positive at L4-5, L5-S1 for DDD and severe concordant pain with injections at these levels. Patient presented to [facility] on 6/2 for elective surgery.  Now s/p L4-5 TLIF with L5-S1 posterior fixation 6/2 complicated by intra-op CSF leak. Hospital course further complicated by impaired functional mobility, pain to lumbar area and R hip, nausea, and HA.     Functional History: Patient lives in [place] with sister in a single story home with no steps to enter.  Prior to admission, (I) with ADLs and mod (I) mobility. DME: [employer].

## 2022-06-07 NOTE — PROGRESS NOTES
Asif Pugh - Neurosurgery ([facility])  Neurosurgery  Progress Note    Subjective:     History of Present Illness: This is a 46-year-old male who is a worker's comp patient who we have been following for some time now.  Patient had a history of of hurting his back lifting a hot water heater in 2020. He has been experiencing significant intractable back pain and radiculopathy and has failed physical therapy, gabapentin, Medrol Dosepak, pain medication as well as epidural and transforaminal injections.  Unfortunately nothing has really helped.  His pain continues to get worse and now impairing his daily living and impacting his ability to return to work.  His EMG does show active denervation of L5 and S1. He was scheduled for lumbar TLIF after his last visit but this was denied. He was referred for a second opinion through worker's comp as they did not feel he qualified for surgery at that time. The physician recommended that he obtain a discogram for further evaluation of his pain. His discogram was positive at L4-5, L5-S1 for DDD and severe concordant pain with injections at these levels. There was a normal control at L2-3. He denies any change in his back pain since his last visit. He continues with lateral right leg pain and weakness in his left foot. He denies new bowel or bladder incontinence.  He also continues wtih right knee pain and has been walking with a walker. he does have swelling, guarding and pain with palpation to his right knee.      Post-Op Info:  Procedure(s) (LRB):  FUSION, SPINE, LUMBAR, TLIF, MINIMALLY INVASIVE L4-5, L5-S1 (N/A)   5 Days Post-Op     Interval History: Pain is better controlled today, however he continues with nausea overnight. Denies emesis or positional HA. VSS. He did not receive much pain medication overnight. Hip Xray without acute process. IPR pending     Medications:  Continuous Infusions:  Scheduled Meds:   acetaminophen  1,000 mg Oral Q6H    bacitracin   Topical (Top) BID     bisacodyL  10 mg Rectal Daily    DULoxetine  30 mg Oral Daily    gabapentin  300 mg Oral TID    heparin (porcine)  5,000 Units Subcutaneous Q8H    LIDOcaine  2 patch Transdermal Q24H    methocarbamoL  500 mg Oral QID    polyethylene glycol  17 g Oral BID    senna-docusate 8.6-50 mg  1 tablet Oral Daily     PRN Meds:acetaminophen, diazePAM, morphine, ondansetron, oxyCODONE     Review of Systems  Objective:     Weight: 97.5 kg (215 lb 0.2 oz)  Body mass index is 30.85 kg/m².  Vital Signs (Most Recent):  Temp: 98 °F (36.7 °C) (06/07/22 0803)  Pulse: 60 (06/07/22 0803)  Resp: 18 (06/07/22 0803)  BP: 126/72 (06/07/22 0437)  SpO2: 96 % (06/07/22 0803) Vital Signs (24h Range):  Temp:  [97.1 °F (36.2 °C)-98.4 °F (36.9 °C)] 98 °F (36.7 °C)  Pulse:  [60-73] 60  Resp:  [18-20] 18  SpO2:  [94 %-99 %] 96 %  BP: (122-146)/(72-86) 126/72     Date 06/07/22 0700 - 06/08/22 0659   Shift 8684-9121 8591-5090 5232-5431 24 Hour Total   INTAKE   Shift Total(mL/kg)       OUTPUT   Urine(mL/kg/hr) 1000   1000   Shift Total(mL/kg) 1000(10.3)   1000(10.3)   Weight (kg) 97.5 97.5 97.5 97.5                     Male External Urinary Catheter 06/02/22 1955 (Active)   Collection Container Urimeter 06/02/22 2100   Securement Method secured to top of thigh w/ adhesive device 06/02/22 2100   Skin no redness;no breakdown 06/02/22 2100   Tolerance no signs/symptoms of discomfort 06/02/22 2100   Output (mL) 250 mL 06/02/22 2100   Catheter Change Date 06/04/22 06/02/22 2100   Catheter Change Time 2000 06/02/22 2100       Neurosurgery Physical Exam  General: well developed, well nourished, no distress.   Head: normocephalic, atraumatic  Neurologic: Alert and oriented. Thought content appropriate.  GCS: Motor: 6/Verbal: 5/Eyes: 4 GCS Total: 15  Mental Status: Awake, Alert, Oriented x3  Cranial nerves: face symmetric, tongue midline, CN II-XII grossly intact.   Eyes: pupils equal, round, reactive to light with accomodation, EOMI.    Sensory:  intact to light touch throughout  Motor Strength:Moves all extremities spontaneously with good tone. No abnormal movements seen. Pain limited RLE exam     Strength  Deltoids Triceps Biceps Wrist Extension Wrist Flexion Hand    Upper: R 5/5 5/5 5/5 5/5 5/5 5/5    L 5/5 5/5 5/5 5/5 5/5 5/5     Iliopsoas Quadriceps Knee  Flexion Tibialis  anterior Gastro- cnemius EHL   Lower: R 4-/5 4/5 4/5 4+/5 4/5 5/5    L 4/5 5/5 5/5 5/5 5/5 5/5     Clonus: absent  Incisions clean dry and intact with skin edges well approximated with staples.   GTB TTP on the right       Significant Labs:  No results for input(s): GLU, NA, K, CL, CO2, BUN, CREATININE, CALCIUM, MG in the last 48 hours.    No results for input(s): WBC, HGB, HCT, PLT in the last 48 hours.    No results for input(s): LABPT, INR, APTT in the last 48 hours.  Microbiology Results (last 7 days)       ** No results found for the last 168 hours. **          All pertinent labs from the last 24 hours have been reviewed.    Significant Diagnostics:  I have reviewed all pertinent imaging results/findings within the past 24 hours.  X-Ray Lumbar Spine Ap And Lateral    Result Date: 6/4/2022  No acute abnormality. Electronically signed by: Jeanmarie Burch MD Date:    06/04/2022 Time:    14:43         Assessment/Plan:     DDD (degenerative disc disease), lumbar   46-year-old male who is a worker's comp patient with history of of hurting his back lifting a hot water heater in 2020. He has been experiencing significant intractable back pain and radiculopathy and has failed conservative treatment. He continues with lateral right leg pain and weakness in his left foot. Denies new bowel or bladder incontinence. Pt is now s/p L4-5 TLIF with L5-S1 posterior fixation 6/2.     -- Neuro stable post op, pain improved   - Q4h neuro checks   --CT Lsp 6/3 with small amount of bone matrix and mild narrowing R L5 ventrolateral aspect of central canal.   --Post op XRs satisfactory. Will obtain  right hip xray today given persistent hip pain post-op  --HOB flat due to intra-op CSF leak until 6/4; completed. Continue to monitor for positional HA  --Pain control:  Oxycodone 5mg q4h prn, valium q6h prn muscle spasms, Acetaminophen 1000mg Q6H, Gabapentin 600mg TID, Lidocaine 5% patches, Morphine 2mg IV  For breakthrough pain  --PRN zofran for nausea   --PT/OT/OOB  --LSO brace when up and OOB    --DVT prophylaxis: JOÃO's, SQH  --Bowel regimen: senna BID and miralax daily  --Atelectasis prevention: IS hourly while awake, PT/OT  --Discussed with Dr. Rhodes    Dispo: IPR pending         Catalina Wolff PA-C  Neurosurgery  Asif Pugh - Neurosurgery ([facility])

## 2022-06-07 NOTE — PT/OT/SLP PROGRESS
Occupational Therapy      Patient Name:  Junior Garay   MRN:  [id number]    Patient not seen today secondary to Nausea/vomiting. Pt reported increased nausea following PT session, pt was given about 1 hour between sessions and was premedicated, yet still deferred therapy session due to nausea still persisting. Will follow-up as appropriate.    6/7/2022

## 2022-06-08 PROCEDURE — 25000003 PHARM REV CODE 250: Performed by: NEUROLOGICAL SURGERY

## 2022-06-08 PROCEDURE — 25000003 PHARM REV CODE 250: Performed by: PHYSICIAN ASSISTANT

## 2022-06-08 PROCEDURE — 97530 THERAPEUTIC ACTIVITIES: CPT

## 2022-06-08 PROCEDURE — 97116 GAIT TRAINING THERAPY: CPT

## 2022-06-08 PROCEDURE — 25000003 PHARM REV CODE 250: Performed by: STUDENT IN AN ORGANIZED HEALTH CARE EDUCATION/TRAINING PROGRAM

## 2022-06-08 PROCEDURE — 99900035 HC TECH TIME PER 15 MIN (STAT)

## 2022-06-08 PROCEDURE — 63600175 PHARM REV CODE 636 W HCPCS: Performed by: PHYSICIAN ASSISTANT

## 2022-06-08 PROCEDURE — 11000001 HC ACUTE MED/SURG PRIVATE ROOM

## 2022-06-08 PROCEDURE — 99024 PR POST-OP FOLLOW-UP VISIT: ICD-10-PCS | Mod: ,,, | Performed by: PHYSICIAN ASSISTANT

## 2022-06-08 PROCEDURE — 99024 POSTOP FOLLOW-UP VISIT: CPT | Mod: ,,, | Performed by: PHYSICIAN ASSISTANT

## 2022-06-08 PROCEDURE — 97535 SELF CARE MNGMENT TRAINING: CPT

## 2022-06-08 RX ORDER — DIAZEPAM 5 MG/1
5 TABLET ORAL EVERY 6 HOURS PRN
Qty: 60 TABLET | Refills: 0 | Status: SHIPPED | OUTPATIENT
Start: 2022-06-08 | End: 2022-07-20 | Stop reason: SDUPTHER

## 2022-06-08 RX ORDER — PROMETHAZINE HYDROCHLORIDE 12.5 MG/1
12.5 TABLET ORAL EVERY 6 HOURS PRN
Status: DISCONTINUED | OUTPATIENT
Start: 2022-06-08 | End: 2022-06-09 | Stop reason: HOSPADM

## 2022-06-08 RX ORDER — HYDROCODONE BITARTRATE AND ACETAMINOPHEN 7.5; 325 MG/1; MG/1
1 TABLET ORAL EVERY 6 HOURS PRN
Status: DISCONTINUED | OUTPATIENT
Start: 2022-06-08 | End: 2022-06-09 | Stop reason: HOSPADM

## 2022-06-08 RX ORDER — ACETAMINOPHEN 325 MG/1
650 TABLET ORAL EVERY 6 HOURS PRN
Status: DISCONTINUED | OUTPATIENT
Start: 2022-06-08 | End: 2022-06-09 | Stop reason: HOSPADM

## 2022-06-08 RX ORDER — HYDROCODONE BITARTRATE AND ACETAMINOPHEN 7.5; 325 MG/1; MG/1
1 TABLET ORAL EVERY 6 HOURS PRN
Qty: 60 TABLET | Refills: 0 | Status: SHIPPED | OUTPATIENT
Start: 2022-06-08 | End: 2022-06-13 | Stop reason: SDUPTHER

## 2022-06-08 RX ORDER — GABAPENTIN 300 MG/1
300 CAPSULE ORAL 3 TIMES DAILY
Qty: 90 CAPSULE | Refills: 11 | Status: SHIPPED | OUTPATIENT
Start: 2022-06-08 | End: 2022-06-09 | Stop reason: HOSPADM

## 2022-06-08 RX ORDER — ACETAMINOPHEN 325 MG/1
650 TABLET ORAL EVERY 6 HOURS PRN
Status: DISCONTINUED | OUTPATIENT
Start: 2022-06-08 | End: 2022-06-08

## 2022-06-08 RX ADMIN — METHOCARBAMOL 500 MG: 500 TABLET ORAL at 12:06

## 2022-06-08 RX ADMIN — HEPARIN SODIUM 5000 UNITS: 5000 INJECTION INTRAVENOUS; SUBCUTANEOUS at 09:06

## 2022-06-08 RX ADMIN — BACITRACIN: 500 OINTMENT TOPICAL at 08:06

## 2022-06-08 RX ADMIN — ACETAMINOPHEN 1000 MG: 500 TABLET ORAL at 12:06

## 2022-06-08 RX ADMIN — POLYETHYLENE GLYCOL 3350 17 G: 17 POWDER, FOR SOLUTION ORAL at 08:06

## 2022-06-08 RX ADMIN — ACETAMINOPHEN 650 MG: 325 TABLET ORAL at 08:06

## 2022-06-08 RX ADMIN — SENNOSIDES AND DOCUSATE SODIUM 1 TABLET: 50; 8.6 TABLET ORAL at 08:06

## 2022-06-08 RX ADMIN — LIDOCAINE 2 PATCH: 50 PATCH CUTANEOUS at 12:06

## 2022-06-08 RX ADMIN — METHOCARBAMOL 500 MG: 500 TABLET ORAL at 08:06

## 2022-06-08 RX ADMIN — ACETAMINOPHEN 1000 MG: 500 TABLET ORAL at 05:06

## 2022-06-08 RX ADMIN — DIAZEPAM 5 MG: 5 TABLET ORAL at 05:06

## 2022-06-08 RX ADMIN — DULOXETINE 30 MG: 30 CAPSULE, DELAYED RELEASE ORAL at 08:06

## 2022-06-08 RX ADMIN — GABAPENTIN 300 MG: 300 CAPSULE ORAL at 08:06

## 2022-06-08 RX ADMIN — DIAZEPAM 5 MG: 5 TABLET ORAL at 08:06

## 2022-06-08 RX ADMIN — HYDROCODONE BITARTRATE AND ACETAMINOPHEN 1 TABLET: 7.5; 325 TABLET ORAL at 04:06

## 2022-06-08 RX ADMIN — HEPARIN SODIUM 5000 UNITS: 5000 INJECTION INTRAVENOUS; SUBCUTANEOUS at 06:06

## 2022-06-08 NOTE — ASSESSMENT & PLAN NOTE
46-year-old male who is a worker's comp patient with history of of hurting his back lifting a hot water heater in 2020. He has been experiencing significant intractable back pain and radiculopathy and has failed conservative treatment. He continues with lateral right leg pain and weakness in his left foot. Denies new bowel or bladder incontinence. Pt is now s/p L4-5 TLIF with L5-S1 posterior fixation 6/2.     -- Neuro stable post op, pain improved   - Q4h neuro checks   --CT Lsp 6/3 with small amount of bone matrix and mild narrowing R L5 ventrolateral aspect of central canal.   --Post op XRs satisfactory  --HOB flat due to intra-op CSF leak until 6/4; completed. Continue to monitor for positional HA  --Pain control:  norco 7.5-325mg q4h prn, valium q6h prn muscle spasms, Acetaminophen 650mg Q6H prn, Gabapentin 300mg TID, Lidocaine 5% patches, Morphine 2mg IV  For breakthrough pain  --PRN zofran and phenergan for nausea   --PT/OT/OOB  --LSO brace when up and OOB    --DVT prophylaxis: JOÃO's, SQH  --Bowel regimen: senna BID and miralax daily  --Atelectasis prevention: IS hourly while awake, PT/OT  --Pt uninterested in rehab placement. He states he would prefer to DC home with home health. Will discuss with PT/OT and CM today   --Discussed with Dr. Rhodes

## 2022-06-08 NOTE — PLAN OF CARE
Problem: Adult Inpatient Plan of Care  Goal: Plan of Care Review  Outcome: Ongoing, Progressing  Goal: Readiness for Transition of Care  Outcome: Ongoing, Progressing     Problem: Pain Acute  Goal: Acceptable Pain Control and Functional Ability  Outcome: Ongoing, Progressing     Problem: Fall Injury Risk  Goal: Absence of Fall and Fall-Related Injury  Outcome: Ongoing, Progressing     Problem: Bowel Motility Impaired (Spinal Surgery)  Goal: Effective Bowel Elimination  Outcome: Ongoing, Progressing     Poc reviewed with patient. Patient c/o pain 7/10 but did not want oxycodone  because it him nauseous. Got med changed to [place]. Patient tolerates well. Patient slept through shift and refused most of afternoon meds. Safety measures maintained. Patient bed in low position. Bed alarm set. Patient call bell with in reach. Patient belongings with in reach. VSS. Full assessment in chart. TANNER.

## 2022-06-08 NOTE — PROGRESS NOTES
Asif Pugh - Neurosurgery ([facility])  Neurosurgery  Progress Note    Subjective:     History of Present Illness: This is a 46-year-old male who is a worker's comp patient who we have been following for some time now.  Patient had a history of of hurting his back lifting a hot water heater in 2020. He has been experiencing significant intractable back pain and radiculopathy and has failed physical therapy, gabapentin, Medrol Dosepak, pain medication as well as epidural and transforaminal injections.  Unfortunately nothing has really helped.  His pain continues to get worse and now impairing his daily living and impacting his ability to return to work.  His EMG does show active denervation of L5 and S1. He was scheduled for lumbar TLIF after his last visit but this was denied. He was referred for a second opinion through worker's comp as they did not feel he qualified for surgery at that time. The physician recommended that he obtain a discogram for further evaluation of his pain. His discogram was positive at L4-5, L5-S1 for DDD and severe concordant pain with injections at these levels. There was a normal control at L2-3. He denies any change in his back pain since his last visit. He continues with lateral right leg pain and weakness in his left foot. He denies new bowel or bladder incontinence.  He also continues wtih right knee pain and has been walking with a walker. he does have swelling, guarding and pain with palpation to his right knee.      Post-Op Info:  Procedure(s) (LRB):  FUSION, SPINE, LUMBAR, TLIF, MINIMALLY INVASIVE L4-5, L5-S1 (N/A)   6 Days Post-Op     Interval History: NAEON. VSS. Nausea resolved. Adjusted pain regimen today. He reports he has been scared to take the pain meds for fear of nausea. He is requesting to go home and is uninterested in rehab placement. He remains neuro stable on exam.    Medications:  Continuous Infusions:  Scheduled Meds:   bacitracin   Topical (Top) BID    bisacodyL   10 mg Rectal Daily    DULoxetine  30 mg Oral Daily    gabapentin  300 mg Oral TID    heparin (porcine)  5,000 Units Subcutaneous Q8H    LIDOcaine  2 patch Transdermal Q24H    methocarbamoL  500 mg Oral QID    polyethylene glycol  17 g Oral BID    senna-docusate 8.6-50 mg  1 tablet Oral Daily     PRN Meds:acetaminophen, diazePAM, HYDROcodone-acetaminophen, morphine, ondansetron, promethazine     Review of Systems  Objective:     Weight: 97.5 kg (215 lb 0.2 oz)  Body mass index is 30.85 kg/m².  Vital Signs (Most Recent):  Temp: 98.2 °F (36.8 °C) (06/08/22 1146)  Pulse: 73 (06/08/22 1146)  Resp: 20 (06/08/22 1146)  BP: 125/79 (06/08/22 1146)  SpO2: 98 % (06/08/22 1243) Vital Signs (24h Range):  Temp:  [98.2 °F (36.8 °C)-99.2 °F (37.3 °C)] 98.2 °F (36.8 °C)  Pulse:  [60-76] 73  Resp:  [16-20] 20  SpO2:  [95 %-99 %] 98 %  BP: (122-154)/(69-87) 125/79                 Male External Urinary Catheter 06/02/22 1955 (Active)   Collection Container Urimeter 06/02/22 2100   Securement Method secured to top of thigh w/ adhesive device 06/02/22 2100   Skin no redness;no breakdown 06/02/22 2100   Tolerance no signs/symptoms of discomfort 06/02/22 2100   Output (mL) 250 mL 06/02/22 2100   Catheter Change Date 06/04/22 06/02/22 2100   Catheter Change Time 2000 06/02/22 2100       Neurosurgery Physical Exam  General: well developed, well nourished, no distress.   Head: normocephalic, atraumatic  Neurologic: Alert and oriented. Thought content appropriate.  GCS: Motor: 6/Verbal: 5/Eyes: 4 GCS Total: 15  Mental Status: Awake, Alert, Oriented x3  Cranial nerves: face symmetric, tongue midline, CN II-XII grossly intact.   Eyes: pupils equal, round, reactive to light with accomodation, EOMI.    Sensory: intact to light touch throughout  Motor Strength:Moves all extremities spontaneously with good tone. No abnormal movements seen. Pain limited RLE exam     Strength  Deltoids Triceps Biceps Wrist Extension Wrist Flexion Hand     Upper: R 5/5 5/5 5/5 5/5 5/5 5/5    L 5/5 5/5 5/5 5/5 5/5 5/5     Iliopsoas Quadriceps Knee  Flexion Tibialis  anterior Gastro- cnemius EHL   Lower: R 4-/5 4/5 4/5 4+/5 4/5 5/5    L 5/5 5/5 5/5 5/5 5/5 5/5     Clonus: absent  Incisions clean dry and intact with skin edges well approximated with staples.     Significant Labs:  Recent Labs   Lab 06/07/22  1744      *   K 4.3   CL 97   CO2 27   BUN 16   CREATININE 0.9   CALCIUM 9.5       No results for input(s): WBC, HGB, HCT, PLT in the last 48 hours.    No results for input(s): LABPT, INR, APTT in the last 48 hours.  Microbiology Results (last 7 days)       ** No results found for the last 168 hours. **          All pertinent labs from the last 24 hours have been reviewed.    Significant Diagnostics:  I have reviewed all pertinent imaging results/findings within the past 24 hours.  X-Ray Lumbar Spine Ap And Lateral    Result Date: 6/4/2022  No acute abnormality. Electronically signed by: Jeanmarie Burch MD Date:    06/04/2022 Time:    14:43         Assessment/Plan:     DDD (degenerative disc disease), lumbar   46-year-old male who is a worker's comp patient with history of of hurting his back lifting a hot water heater in 2020. He has been experiencing significant intractable back pain and radiculopathy and has failed conservative treatment. He continues with lateral right leg pain and weakness in his left foot. Denies new bowel or bladder incontinence. Pt is now s/p L4-5 TLIF with L5-S1 posterior fixation 6/2.     -- Neuro stable post op, pain improved   - Q4h neuro checks   --CT Lsp 6/3 with small amount of bone matrix and mild narrowing R L5 ventrolateral aspect of central canal.   --Post op XRs satisfactory  --HOB flat due to intra-op CSF leak until 6/4; completed. Continue to monitor for positional HA  --Pain control:  norco 7.5-325mg q4h prn, valium q6h prn muscle spasms, Acetaminophen 650mg Q6H prn, Gabapentin 300mg TID, Lidocaine 5% patches,  Morphine 2mg IV  For breakthrough pain  --PRN zofran and phenergan for nausea   --PT/OT/OOB  --LSO brace when up and OOB    --DVT prophylaxis: JOÃO's, SQH  --Bowel regimen: senna BID and miralax daily  --Atelectasis prevention: IS hourly while awake, PT/OT  --Pt uninterested in rehab placement. He states he would prefer to DC home with home health. Will discuss with PT/OT and CM today   --Discussed with ZIYAD TenaC  Neurosurgery  Asif Pugh - Neurosurgery ([facility])

## 2022-06-08 NOTE — PT/OT/SLP PROGRESS
"Occupational Therapy   Treatment       Patient Name:  Junior Garay   MRN:  [id number]  Admit Date: 6/2/2022  Admitting Diagnosis:  <principal problem not specified>   Length of Stay: 6 days  Recent Surgery: Procedure(s) (LRB):  FUSION, SPINE, LUMBAR, TLIF, MINIMALLY INVASIVE L4-5, L5-S1 (N/A) 6 Days Post-Op    Recommendations:     Discharge Recommendations: rehabilitation facility  Discharge Equipment Recommendations:  walker, rolling  Barriers to discharge:   (Increased skilled A required)    Plan:     Patient to be seen 4 x/week to address the above listed problems via self-care/home management, therapeutic activities, therapeutic exercises, neuromuscular re-education  · Plan of Care Expires: 07/04/22  · Plan of Care Reviewed with: patient, significant other    Assessment:   Junior Garay is a 47 y.o. male with a medical diagnosis of <principal problem not specified>.  He presents with the following performance deficits affecting function: weakness, impaired endurance, impaired self care skills, impaired functional mobilty, gait instability, impaired balance, decreased lower extremity function, pain, impaired skin, orthopedic precautions.  Pt continues to benefit from a collaborative PT/OT/SLP program to improve quality of life and focus on recovery of impairments.     Pt still recommended to d/c to Rehab for continued improvement in deficit areas and ADLs/functional mobility for increased functional independence and OQL prior to retrun to home environment. Pt was able to complete ambulation to bathroom with SBA and in hallway for ~100 ft x 2.      Rehab Prognosis: Good; patient would benefit from acute skilled OT services to address these deficits and reach maximum level of function.        Subjective   Communicated with: RN prior to session.  Patient found HOB elevated with telemetry, pulse ox (continuous) upon OT entry to room.    Patient: "Yeah man, I just wanna get " "home."    Pain/Comfort:  · Pain Rating 1: 8/10  · Location - Side 1: Right  · Location - Orientation 1: generalized  · Location 1: hip  · Pain Addressed 1: Reposition, Distraction, Cessation of Activity, Pre-medicate for activity  · Pain Rating Post-Intervention 1: other (see comments) (not rated)    Objective:   Patient found with: telemetry, pulse ox (continuous)     General Precautions: Standard, Cardiac fall   Orthopedic Precautions:spinal precautions   Braces: LSO   Respiratory Status:    Room air  Vitals: /69 (BP Location: Right arm, Patient Position: Lying)   Pulse 76   Temp 98.6 °F (37 °C) (Oral)   Resp 19   Ht 5' 10" (1.778 m)   Wt 97.5 kg (215 lb 0.2 oz)   SpO2 99%   BMI 30.85 kg/m²     Outcome Measures:  AMPAC 6 Click ADL: 21    Cognition:   · Oriented X 4, Alert and Cooperative  · Command following: follows multi-step commands  · Communication: clear/fluent    Occupational Performance:  Bed Mobility:    · Patient completed Rolling/Turning to Right with stand by assistance  · Patient completed Supine to Sit with stand by assistance on R side of bed  · Scooting anteriorly to EOB to have both feet planted on floor: stand by assistance    Functional Mobility/Transfers:   Static Sitting EOB: SBA   Dynamic Sitting EOB: SBA to don pajamas and to don LSO while seated at EOB.   Patient completed Sit <> Stand Transfer with stand by assistance  with  rolling walker    Static Standing Balance: SBA   Dynamic Standing Balance: CGA initially, and then progressed to SBA to engage in functional mobility for ~20ft in room to walk to bathroom and complete oral hygiene in standing. Pt SBA to complete oral hygiene. Pt then ~100ft x2 in hallway using RW to walk to elevators and back to t/f to bedside chair.   Patient completed Bed <> Chair Transfer using Step Transfer technique with stand by assistance with rolling walker    Activities of Daily Living:  · Grooming: stand by assistance for oral hygiene in " standing with RW for balance.  · Upper Body Dressing: stand by assistance to don LSO while seated at EOB.  · Lower Body Dressing: minimum assistance to don pajama pants while seated at EOB. Pt was able to cross legs and thread legs, yet pants were caught on foot and pt required small assist to adjust before completing threading. Pt able to pull pants over hips in standing.    AMPAC 6 Click ADL:  AMPAC Total Score: 21    Treatment & Education:  -OT POC, safety during ADLs and mobility   -Education on energy conservation and task modification to maximize safety and independence  -Questions answered within OT scope of practice.      Patient left up in chair with all lines intact, call button in reach, chair alarm on, RN notified and S.O. present    GOALS:   Multidisciplinary Problems     Occupational Therapy Goals        Problem: Occupational Therapy    Goal Priority Disciplines Outcome Interventions   Occupational Therapy Goal     OT, PT/OT Ongoing, Progressing    Description: Goals set on 6/4/2022 with expiration date 6/18/2022:  Patient will increase functional independence with ADLs by performing:    Supine <> Sit with Stand-by Assistance.  Feeding with Stand-by Assistance.  Grooming while standing at sink with Stand-by Assistance.  UB Dressing with Stand-by Assistance.  LB Dressing with Stand-by Assistance.  Step transfer with Stand-by Assistance with DME as needed.  Pt will demonstrate understanding of education provided regarding energy conservation and task modification through teach-back method.                          Time Tracking:     OT Date of Treatment: 06/08/22  OT Start Time: 0942  OT Stop Time: 1005  OT Total Time (min): 23 min  Additional staff present: PA and RN      Billable Minutes:Self Care/Home Management 12 min  Therapeutic Activity 13 min      6/8/2022

## 2022-06-08 NOTE — PT/OT/SLP PROGRESS
Physical Therapy      Patient Name:  Junior Garay   MRN:  [id number]    Patient not seen today secondary to Patient fatigue, Patient unwilling to participate. Pt found sleeping soundly, awakened with external stimuli but reporting increased fatigue and feeling too tired to participate in therapy. Pt agreeable to physical therapy tomorrow. Will follow-up at next scheduled session as able. PA notified pt unable to participate in PT this date.      6/8/2022

## 2022-06-08 NOTE — PT/OT/SLP PROGRESS
Physical Therapy Treatment    Patient Name:  Junior Garay   MRN:  [id number]    Recommendations:     Discharge Recommendations:  home health PT, home health OT   Discharge Equipment Recommendations: walker, rolling   Barriers to discharge: None    Assessment:     Junior Garay is a 47 y.o. male admitted with a medical diagnosis of DDD (degenerative disc disease), lumbar.  He presents with the following impairments/functional limitations:  weakness, impaired balance, impaired endurance, impaired self care skills, impaired functional mobilty, gait instability, impaired cardiopulmonary response to activity, pain. Pt progressing functional mobility, as he was able to ambulate increased distance this date. Pt completing functional mobility without physical assist but with RW and cues needed for improved technique and safety. D/c recs changed to HH PT/OT with RW. Pt would continue to benefit from skilled acute PT in order to address current deficits and progress functional mobility.     Rehab Prognosis: Good; patient would benefit from acute skilled PT services to address these deficits and reach maximum level of function.    Recent Surgery: Procedure(s) (LRB):  FUSION, SPINE, LUMBAR, TLIF, MINIMALLY INVASIVE L4-5, L5-S1 (N/A) 6 Days Post-Op    Plan:     During this hospitalization, patient to be seen 4 x/week to address the identified rehab impairments via gait training, therapeutic activities, therapeutic exercises, neuromuscular re-education and progress toward the following goals:    · Plan of Care Expires:  07/05/22    Subjective     Chief Complaint: post-op back pain  Patient/Family Comments/goals: return home  Pt initially declining therapy this date due to increased fatigue (see previous noted). However, upon 2nd attempt, pt awake and agreeable to participation in therapy session.   Pain/Comfort:  · Pain Rating 1:  (reported post-op back pain but did not rate)  · Pain Addressed 1: Reposition,  Distraction, Cessation of Activity      Objective:     Communicated with RN prior to session.  Patient found supine with pulse ox (continuous), telemetry upon PT entry to room.     General Precautions: Standard, fall   Orthopedic Precautions:spinal precautions   Braces: LSO  Respiratory Status: Nasal cannula; O2 prn per pt   SpO2 97% following ambulation on RA     Functional Mobility:  · Bed Mobility:     · Supine to Sit: supervision with HOB elevated and using bed rail, via log-roll technique   · Sit to Supine: supervision with HOB elevated and using bed rail, via log-roll technique   · Transfers:     · Sit to Stand:  stand by assistance with rolling walker from EOB  · Cues for hand placement and transfer technique with RW  · Gait: ~350 ft. with RW and CGA  · demo'd decreased hayley, decreased step length, impaired weight-shifting ability, decreased toe-floor clearance, instability  · Cues provided for RW management, appropriate weight-shifts, balance corrections, self-pacing, and safe technique   · RW adjusted for improved pt fit during gait trial       AM-PAC 6 CLICK MOBILITY  Turning over in bed (including adjusting bedclothes, sheets and blankets)?: 3  Sitting down on and standing up from a chair with arms (e.g., wheelchair, bedside commode, etc.): 3  Moving from lying on back to sitting on the side of the bed?: 3  Moving to and from a bed to a chair (including a wheelchair)?: 3  Need to walk in hospital room?: 3  Climbing 3-5 steps with a railing?: 2  Basic Mobility Total Score: 17       Therapeutic Activities and Exercises:  Pt educated on role of PT and PT POC. Pt verbalized understanding.   Pt educated on spinal precautions, log-roll technique, and LSO wear. Pt v/u. LSO donned/doffed while seated EOB.  Pt educated on the effects of bed rest and the importance of OOB activity. Pt encouraged to sit UIC majority of day as tolerated and continue daily ambulation with nursing assist. Pt verbalized  understanding.  Pt oriented to call bell and instructed to call for staff assist with standing tasks/transfers. Pt verbalized understanding.   Discussed barriers to d/c and change in d/c recs to home with  PT and OT and RW. Pt verbalized agreement to change in recs, reports adequate caregiver support available, and no major barriers to return home.    Patient left supine with all lines intact, call button in reach, RN notified and PCT present..    GOALS:   Multidisciplinary Problems     Physical Therapy Goals        Problem: Physical Therapy    Goal Priority Disciplines Outcome Goal Variances Interventions   Physical Therapy Goal     PT, PT/OT Ongoing, Progressing     Description: Goals to be met by: 6/15/22     Patient will increase functional independence with mobility by performin. Supine to sit with Modified [place]  2. Sit to supine with Modified [place]  3. Sit to stand transfer with Modified [place]  4. Gait  x 100 feet with Stand-by Assistance using LRAD, if needed.   5. Stand for 5 minutes with Modified [place] using LRAD, if needed  6. Lower extremity exercise program x10 reps per handout, with independence                     Time Tracking:     PT Received On: 22  PT Start Time: 1522     PT Stop Time: 1545  PT Total Time (min): 23 min     Billable Minutes: Gait Training 8 and Therapeutic Activity 15    Treatment Type: Treatment  PT/PTA: PT     PTA Visit Number: 0     2022

## 2022-06-08 NOTE — SUBJECTIVE & OBJECTIVE
Interval History: NAEON. VSS. Nausea resolved. Adjusted pain regimen today. He reports he has been scared to take the pain meds for fear of nausea. He is requesting to go home and is uninterested in rehab placement. He remains neuro stable on exam.    Medications:  Continuous Infusions:  Scheduled Meds:   bacitracin   Topical (Top) BID    bisacodyL  10 mg Rectal Daily    DULoxetine  30 mg Oral Daily    gabapentin  300 mg Oral TID    heparin (porcine)  5,000 Units Subcutaneous Q8H    LIDOcaine  2 patch Transdermal Q24H    methocarbamoL  500 mg Oral QID    polyethylene glycol  17 g Oral BID    senna-docusate 8.6-50 mg  1 tablet Oral Daily     PRN Meds:acetaminophen, diazePAM, HYDROcodone-acetaminophen, morphine, ondansetron, promethazine     Review of Systems  Objective:     Weight: 97.5 kg (215 lb 0.2 oz)  Body mass index is 30.85 kg/m².  Vital Signs (Most Recent):  Temp: 98.2 °F (36.8 °C) (06/08/22 1146)  Pulse: 73 (06/08/22 1146)  Resp: 20 (06/08/22 1146)  BP: 125/79 (06/08/22 1146)  SpO2: 98 % (06/08/22 1243) Vital Signs (24h Range):  Temp:  [98.2 °F (36.8 °C)-99.2 °F (37.3 °C)] 98.2 °F (36.8 °C)  Pulse:  [60-76] 73  Resp:  [16-20] 20  SpO2:  [95 %-99 %] 98 %  BP: (122-154)/(69-87) 125/79                 Male External Urinary Catheter 06/02/22 1955 (Active)   Collection Container Urimeter 06/02/22 2100   Securement Method secured to top of thigh w/ adhesive device 06/02/22 2100   Skin no redness;no breakdown 06/02/22 2100   Tolerance no signs/symptoms of discomfort 06/02/22 2100   Output (mL) 250 mL 06/02/22 2100   Catheter Change Date 06/04/22 06/02/22 2100   Catheter Change Time 2000 06/02/22 2100       Neurosurgery Physical Exam  General: well developed, well nourished, no distress.   Head: normocephalic, atraumatic  Neurologic: Alert and oriented. Thought content appropriate.  GCS: Motor: 6/Verbal: 5/Eyes: 4 GCS Total: 15  Mental Status: Awake, Alert, Oriented x3  Cranial nerves: face symmetric, tongue  midline, CN II-XII grossly intact.   Eyes: pupils equal, round, reactive to light with accomodation, EOMI.    Sensory: intact to light touch throughout  Motor Strength:Moves all extremities spontaneously with good tone. No abnormal movements seen. Pain limited RLE exam     Strength  Deltoids Triceps Biceps Wrist Extension Wrist Flexion Hand    Upper: R 5/5 5/5 5/5 5/5 5/5 5/5    L 5/5 5/5 5/5 5/5 5/5 5/5     Iliopsoas Quadriceps Knee  Flexion Tibialis  anterior Gastro- cnemius EHL   Lower: R 4-/5 4/5 4/5 4+/5 4/5 5/5    L 5/5 5/5 5/5 5/5 5/5 5/5     Clonus: absent  Incisions clean dry and intact with skin edges well approximated with staples.     Significant Labs:  Recent Labs   Lab 06/07/22  1744      *   K 4.3   CL 97   CO2 27   BUN 16   CREATININE 0.9   CALCIUM 9.5       No results for input(s): WBC, HGB, HCT, PLT in the last 48 hours.    No results for input(s): LABPT, INR, APTT in the last 48 hours.  Microbiology Results (last 7 days)       ** No results found for the last 168 hours. **          All pertinent labs from the last 24 hours have been reviewed.    Significant Diagnostics:  I have reviewed all pertinent imaging results/findings within the past 24 hours.  X-Ray Lumbar Spine Ap And Lateral    Result Date: 6/4/2022  No acute abnormality. Electronically signed by: Jeanmarie Burch MD Date:    06/04/2022 Time:    14:43

## 2022-06-08 NOTE — PLAN OF CARE
..POC and meds reviewed with patient. Questions encouraged and answered. Patient verbalized understanding. V/S stable throughout shift; please see flowsheets for V/S information and full assessment data. NAEO. Siderails up x 2, bed locked in lowest position, call bell in reach, O2 & suction at bedside, WCTM.

## 2022-06-09 VITALS
OXYGEN SATURATION: 100 % | TEMPERATURE: 98 F | DIASTOLIC BLOOD PRESSURE: 86 MMHG | HEART RATE: 96 BPM | SYSTOLIC BLOOD PRESSURE: 128 MMHG | BODY MASS INDEX: 30.78 KG/M2 | RESPIRATION RATE: 16 BRPM | WEIGHT: 215 LBS | HEIGHT: 70 IN

## 2022-06-09 PROCEDURE — 25000003 PHARM REV CODE 250: Performed by: PHYSICIAN ASSISTANT

## 2022-06-09 PROCEDURE — 63600175 PHARM REV CODE 636 W HCPCS: Performed by: PHYSICIAN ASSISTANT

## 2022-06-09 PROCEDURE — 99024 POSTOP FOLLOW-UP VISIT: CPT | Mod: ,,, | Performed by: PHYSICIAN ASSISTANT

## 2022-06-09 PROCEDURE — 25000003 PHARM REV CODE 250: Performed by: NEUROLOGICAL SURGERY

## 2022-06-09 PROCEDURE — 99024 PR POST-OP FOLLOW-UP VISIT: ICD-10-PCS | Mod: ,,, | Performed by: PHYSICIAN ASSISTANT

## 2022-06-09 PROCEDURE — 94799 UNLISTED PULMONARY SVC/PX: CPT

## 2022-06-09 PROCEDURE — 94761 N-INVAS EAR/PLS OXIMETRY MLT: CPT

## 2022-06-09 RX ORDER — ONDANSETRON 4 MG/1
8 TABLET, ORALLY DISINTEGRATING ORAL EVERY 8 HOURS PRN
Qty: 30 TABLET | Refills: 0 | Status: SHIPPED | OUTPATIENT
Start: 2022-06-09 | End: 2023-05-02

## 2022-06-09 RX ORDER — POLYETHYLENE GLYCOL 3350 17 G/17G
17 POWDER, FOR SOLUTION ORAL 2 TIMES DAILY PRN
Status: DISCONTINUED | OUTPATIENT
Start: 2022-06-09 | End: 2022-06-09 | Stop reason: HOSPADM

## 2022-06-09 RX ADMIN — HYDROCODONE BITARTRATE AND ACETAMINOPHEN 1 TABLET: 7.5; 325 TABLET ORAL at 06:06

## 2022-06-09 RX ADMIN — DIAZEPAM 5 MG: 5 TABLET ORAL at 09:06

## 2022-06-09 RX ADMIN — HYDROCODONE BITARTRATE AND ACETAMINOPHEN 1 TABLET: 7.5; 325 TABLET ORAL at 12:06

## 2022-06-09 RX ADMIN — BACITRACIN: 500 OINTMENT TOPICAL at 08:06

## 2022-06-09 RX ADMIN — HEPARIN SODIUM 5000 UNITS: 5000 INJECTION INTRAVENOUS; SUBCUTANEOUS at 06:06

## 2022-06-09 RX ADMIN — LIDOCAINE 2 PATCH: 50 PATCH CUTANEOUS at 12:06

## 2022-06-09 NOTE — PLAN OF CARE
[facility] (Hospital)  Discharge Final Note    Primary Care Provider: Mark Littlejohn MD    Expected Discharge Date: 6/9/2022     Patient to be discharged home.  The patient was provided with a referral to OP Therapy.  Rec is hh, however, patient walking 350 feet and CM not able to secure auth for hh from [employer] at this time.  Patient verbalizes understanding.  Patient to be provided with a rw.  Family to provide transportation home.  Neurosurgery clinic to schedule follow up appointment.    Future Appointments   Date Time Provider Department Center   6/13/2022  2:00 PM Apolonia Rodrigues PA-C [facility]   6/16/2022 10:00 AM Enid Herrera RN [facility]   6/17/2022 10:00 AM Claudia Mcnamara, [place], LCSW [facility]   7/12/2022 10:30 AM [facility]   7/12/2022 11:30 AM Humberto Rhodes MD [facility]       Final Discharge Note (most recent)     Final Note - 06/09/22 1058        Final Note    Assessment Type Final Discharge Note     Anticipated Discharge Disposition Home or Self Care        Post-Acute Status    Post-Acute Authorization Placement;Other     Post-Acute Placement Status Discharge Plan Changed     Other Status No Post-Acute Service Needs     Discharge Delays None known at this time                 Important Message from Medicare

## 2022-06-09 NOTE — DISCHARGE INSTRUCTIONS
Post op Spine Patient Instructions    Activity Restrictions:  [x]  Return to work will be determined on an individual basis.  [x]  No lifting greater than 5-10 pounds.  [x]  Avoid bending and twisting the area of your surgery more than 45 degrees from neutral position in any direction.  [x]  No driving or operating machinery:  [x]  until cleared by your surgeon.  [x]  while taking narcotic pain medications or muscle relaxants.  [x]  Wear your brace at all times except when lying in bed, showering, using the restroom, or performing hygiene tasks.   [x]  Increase ambulation over the next 2 weeks so that you are walking 2 miles per day at 2 weeks post-operatively.  [x]  Walk on paved surfaces only. It is okay to walk up and down stairs while holding onto a side rail.  [x]  No sexual activity for 6 weeks.    Discharge Medication/Follow-up:  [x]  Please refer to discharge medication reconciliation form.  [x]  Do not take any OTC products containing acetaminophen (tylenol) at the same time as you take your narcotic pain medication. Medications that may contain acetaminophen include but are not limited to: Excedrin and other headache medications, arthritis medications, cold and sinus medications, etc. Please review the list of active ingredients in any OTC medication prior to taking it.  [x]  Do not take ANY Aspirin or Aspirin containing products for 2 weeks after surgery.   [x]  Do not take ANY herbal supplements for 2 weeks after surgery.    [x]  Do not take ANY non-steroidal anti-inflammatory drugs (NSAIDS), including the following: ibuprofen, naprosyn, Aleve, Advil, Indocin, Mobic, or Celebrex for 12 weeks after surgery.   [x]  Prescriptions for appropriate medication will be given upon discharge.  [x]  Take docusate (Colace 100 mg): take one capsule a day as needed for constipation. You can get this over the counter.  [x]  Follow-up appointment:  [x]  10-14 days post-op for wound check by physician  assistant/nurse  [x]  4-6 weeks with MD:  [x]  with x-rays  [x]  An appointment will be mailed to you.    Wound Care:  [x]  No bandage required. Keep your incision open to the air.   [x]  You may shower on the 2nd day after your surgery. Keep the incision clean and dry at all times. Do not allow the force of water to hit the incision. If the incision gets damp, pat it dry. Do not rub or scrub the incision.  [x]  You cannot take a bath until 8 weeks after surgery.  [x]  Apply bacitracin to incision twice a day for 2 weeks until staples removed    Call your doctor or go to the Emergency Room for any signs of infection, including: increased redness, drainage, pain, or fever (temperature ?101.5 for 24 hours). Call your doctor or go to the Emergency Room if there are any localized neurological changes; problems with speech, vision, numbness, tingling, weakness, or severe headache; inability to control urination or bowel movements; inability to urinate; or for other concerns.            Special Instructions:  [x]  No use of tobacco products.  [x]  Diet: Please eat a regular diet as tolerated.      Physicians need 3 days' notice for pain medicine to be refilled. Pain medicine will only be refilled between 8 AM and 5 PM, Monday through Friday, due to Food and Drug Administration regulation of documentation.    If you have any questions about this form, please call [phone].    Form No. 16054 (Revised 10/31/2013)

## 2022-06-09 NOTE — DISCHARGE SUMMARY
[facility] ([facility])  Neurosurgery  Discharge Summary      Patient Name: Junior Garay  MRN: [id number]  Admission Date: 6/2/2022  Hospital Length of Stay: 7 days  Discharge Date and Time: 6/9/2022  1:44 PM  Attending Physician: Humberto Rhodes MD   Discharging Provider: Jo Root PA-C  Primary Care Provider: Mark Littlejohn MD    HPI:   This is a 46-year-old male who is a worker's comp patient who we have been following for some time now.  Patient had a history of of hurting his back lifting a hot water heater in 2020. He has been experiencing significant intractable back pain and radiculopathy and has failed physical therapy, gabapentin, Medrol Dosepak, pain medication as well as epidural and transforaminal injections.  Unfortunately nothing has really helped.  His pain continues to get worse and now impairing his daily living and impacting his ability to return to work.  His EMG does show active denervation of L5 and S1. He was scheduled for lumbar TLIF after his last visit but this was denied. He was referred for a second opinion through worker's comp as they did not feel he qualified for surgery at that time. The physician recommended that he obtain a discogram for further evaluation of his pain. His discogram was positive at L4-5, L5-S1 for DDD and severe concordant pain with injections at these levels. There was a normal control at L2-3. He denies any change in his back pain since his last visit. He continues with lateral right leg pain and weakness in his left foot. He denies new bowel or bladder incontinence.  He also continues wtih right knee pain and has been walking with a walker. he does have swelling, guarding and pain with palpation to his right knee.      Procedure(s) (LRB):  FUSION, SPINE, LUMBAR, TLIF, MINIMALLY INVASIVE L4-5, L5-S1 (N/A)     Hospital Course: 6/4: NAEON. AFVSS. Feels improved strength this AM. Pain better controlled.   6/5: NAEON. AFVSS. Exam stable.  Pain controlled. Tolerating PO. Voiding. Sat in bedside chair yesterday.   6/6: NAEON. VSS. Pt with nausea and HA with standing this am. BP remains stable. PRN zofran given. Pt denies positional HA. Reports the pain medication is contributing to his nausea but he continues to report right hip pain that radiates into his thigh on the right. He has been OOB with walker to the bathroom this am. Denies new numbness or paresthesias.   6/7: Pain is better controlled today, however he continues with nausea overnight. Denies emesis or positional HA. VSS. He did not receive much pain medication overnight. Hip Xray without acute process. IPR pending   6/8: NAEON. VSS. Nausea resolved. Adjusted pain regimen today. He is requesting to go home and is uninterested in rehab placement. He remains neuro stable on exam.  6/9: NAEON. AFVSS. Pt doing well, pain relatively controlled on current regimen. Reports nausea has resolved since stopping gabapentin and robaxin. Neuro exam stable. PT/OT recs updated to . Medically ready for discharge. Discussed postop and discharge instructions, all questions answered, 2 week postop appointment scheduled. Encouraged to call our office with any questions or concerns.        Goals of Care Treatment Preferences:  Code Status: Full Code    Neurosurgery Physical Exam  General: well developed, well nourished, no distress.   Head: normocephalic, atraumatic  Neurologic: Alert and oriented. Thought content appropriate.  GCS: Motor: 6/Verbal: 5/Eyes: 4 GCS Total: 15  Mental Status: Awake, Alert, Oriented x3  Cranial nerves: face symmetric, tongue midline, CN II-XII grossly intact.   Eyes: pupils equal, round, reactive to light with accomodation, EOMI.    Sensory: intact to light touch throughout  Motor Strength:Moves all extremities spontaneously with good tone. No abnormal movements seen. Pain limited RLE exam      Strength   Deltoids Triceps Biceps Wrist Extension Wrist Flexion Hand    Upper: R 5/5  "5/5 5/5 5/5 5/5 5/5     L 5/5 5/5 5/5 5/5 5/5 5/5       Iliopsoas Quadriceps Knee  Flexion Tibialis  anterior Gastro- cnemius EHL   Lower: R 4/5 4/5 4/5 4+/5 4/5 5/5     L 5/5 5/5 5/5 5/5 5/5 5/5      Clonus: absent  Incisions clean dry and intact with skin edges well approximated with staples.       Consults:   Consults (From admission, onward)        Status Ordering Provider     Inpatient consult to Physical Medicine Rehab  Once        Provider:  (Not yet assigned)    Completed SUSAN HALL          Significant Diagnostic Studies: Labs:   BMP:   Recent Labs   Lab 06/07/22  1744      *   K 4.3   CL 97   CO2 27   BUN 16   CREATININE 0.9   CALCIUM 9.5   , CMP   Recent Labs   Lab 06/07/22  1744   *   K 4.3   CL 97   CO2 27      BUN 16   CREATININE 0.9   CALCIUM 9.5   PROT 6.8   ALBUMIN 2.8*   BILITOT 0.5   ALKPHOS 72   AST 49*   ALT 57*   ANIONGAP 10   ESTGFRAFRICA >60.0   EGFRNONAA >60.0   , CBC No results for input(s): WBC, HGB, HCT, PLT in the last 48 hours., INR   Lab Results   Component Value Date    INR 1.0 05/16/2022    INR 0.9 01/31/2022    and All labs within the past 24 hours have been reviewed  Radiology: SURG - FL SURGERY FLUORO USAGE  XR LUMBAR SPINE AP AND LATERAL  CT LUMBAR SPINE WITHOUT CONTRAST  XR HIP WITH PELVIS WHEN PERFORMED, 2 OR 3  VIEWS RIGHT      Pending Diagnostic Studies:     None        Final Active Diagnoses:    Diagnosis Date Noted POA    PRINCIPAL PROBLEM:  DDD (degenerative disc disease), lumbar [M51.36] 07/12/2021 Yes      Problems Resolved During this Admission:    Diagnosis Date Noted Date Resolved POA    Impaired functional mobility and endurance [Z74.09] 06/07/2022 06/07/2022 Unknown      Discharged Condition: good     Disposition: Home or Self Care    Follow Up:    Patient Instructions:      WALKER FOR HOME USE     Order Specific Question Answer Comments   Type of Walker: Adult (5'4"-6'6")    With wheels? Yes    Height: 5' 10" (1.778 m)    Weight: 97.5 " kg (215 lb 0.2 oz)    Length of need (1-99 months): 3    Please check all that apply: Patient's condition impairs ambulation.      Ambulatory referral/consult to Home Health   Standing Status: Future   Referral Priority: Routine Referral Type: Home Health   Referral Reason: Specialty Services Required   Requested Specialty: Home Health Services   Number of Visits Requested: 1     Ambulatory referral/consult to Physical/Occupational Therapy   Standing Status: Future   Referral Priority: Routine Referral Type: Physical Medicine   Referral Reason: Specialty Services Required   Number of Visits Requested: 1     Notify your health care provider if you experience any of the following:  temperature >100.4     Notify your health care provider if you experience any of the following:  persistent nausea and vomiting or diarrhea     Notify your health care provider if you experience any of the following:  severe uncontrolled pain     Notify your health care provider if you experience any of the following:  redness, tenderness, or signs of infection (pain, swelling, redness, odor or green/yellow discharge around incision site)     Notify your health care provider if you experience any of the following:  difficulty breathing or increased cough     Notify your health care provider if you experience any of the following:  severe persistent headache     Notify your health care provider if you experience any of the following:  worsening rash     Notify your health care provider if you experience any of the following:  persistent dizziness, light-headedness, or visual disturbances     Notify your health care provider if you experience any of the following:  increased confusion or weakness     Activity as tolerated     Medications:  Reconciled Home Medications:      Medication List      START taking these medications    diazePAM 5 MG tablet  Commonly known as: VALIUM  Take 1 tablet (5 mg total) by mouth every 6 (six) hours as needed  (muscle spasm).     HYDROcodone-acetaminophen 7.5-325 mg per tablet  Commonly known as: NORCO  Take 1 tablet by mouth every 6 (six) hours as needed for Pain.     ondansetron 4 MG Tbdl  Commonly known as: ZOFRAN-ODT  Dissolve 2 tablets (8 mg total) by mouth every 8 (eight) hours as needed (nausea).        CONTINUE taking these medications    acetaminophen 500 MG tablet  Commonly known as: TYLENOL  Take 1 tablet (500 mg total) by mouth 2 (two) times a day.     DULoxetine 30 MG capsule  Commonly known as: CYMBALTA  Take 1 capsule (30 mg total) by mouth once daily.            Jo Root PA-C  Neurosurgery  [facility] - Neurosurgery \[facility] [place] [facility]\"")

## 2022-06-09 NOTE — PLAN OF CARE
Problem: Adult Inpatient Plan of Care  Goal: Plan of Care Review  Outcome: Ongoing, Progressing     Problem: Postoperative Nausea and Vomiting (Spinal Surgery)  Goal: Nausea and Vomiting Relief  Outcome: Ongoing, Progressing  Intervention: Prevent or Manage Nausea and Vomiting  Flowsheets (Taken 6/9/2022 0351)  Nausea/Vomiting Interventions:   nausea triggers minimized   stimuli minimized     POC reviewed with patient this shift.  A/O x4.  Respirations unlabored.  Skin w/d.  Incision to back continues with staples BAM.  No bleeding/drainage noted.  Back pain continues but being managed with PRN meds.  Pt continues to refuse Gabapentin/Methocarbamol as per report r/t nausea.  Tolerates meds whole with water without difficulty.  No c/o nausea this shift.  VSS.  See flowsheet for full assessment.  Able to verbalize wants/needs.  No s/s of distress noted at this time.  Fall/safety precautions maintained.

## 2022-06-09 NOTE — PLAN OF CARE
Problem: Pain Acute  Goal: Acceptable Pain Control and Functional Ability  Outcome: Ongoing, Progressing     Problem: Adult Inpatient Plan of Care  Goal: Optimal Comfort and Wellbeing  Outcome: Ongoing, Progressing   POC reviewed with the patient and they verbalized understanding. All comments and concerns addressed. PRN pain med given .Bed locked in lowest position with bed alarm set, call light within reach. Safety precautions maintained. VSS, see flowsheets.  Will continue to monitor for changes to POC and clinical condition.

## 2022-06-13 ENCOUNTER — TELEPHONE (OUTPATIENT)
Dept: NEUROSURGERY | Facility: CLINIC | Age: 47
End: 2022-06-13
Payer: COMMERCIAL

## 2022-06-13 RX ORDER — HYDROCODONE BITARTRATE AND ACETAMINOPHEN 7.5; 325 MG/1; MG/1
1 TABLET ORAL EVERY 6 HOURS PRN
Qty: 28 TABLET | Refills: 0 | Status: SHIPPED | OUTPATIENT
Start: 2022-06-13 | End: 2022-06-24 | Stop reason: SDUPTHER

## 2022-06-13 NOTE — TELEPHONE ENCOUNTER
Spoke to pt and explained worker's comp situation. I explained that we were told the surgery appeal was approved, but apparently the authorization has not yet been approved by the workers comp . Told pt to stand by for the PT while our workers comp dept handles the situation. Pt also informed me he only received 28 tablets of pain medication, not 60; this was verified by our pharmacfy. [facility] refilled pt's medication.    ----- Message from Renato Crook sent at 6/13/2022  9:04 AM CDT -----  Contact: Patient  Patient requesting call back in regards to physical therapy appointment. Patient also stated that he's in a lot of pain.     RX REFILL REQUEST: HYDROcodone-acetaminophen (NORCO) 7.5-325 mg per tablet    PHARMACY : [id number] - [place] - [address] AT [facility] [address]  [place]      Patient @[phone]

## 2022-06-16 ENCOUNTER — CLINICAL SUPPORT (OUTPATIENT)
Dept: NEUROSURGERY | Facility: CLINIC | Age: 47
End: 2022-06-16
Payer: MEDICAID

## 2022-06-16 VITALS — TEMPERATURE: 98 F

## 2022-06-16 RX ORDER — CYCLOBENZAPRINE HCL 10 MG
10 TABLET ORAL 3 TIMES DAILY PRN
Qty: 60 TABLET | Refills: 0 | Status: SHIPPED | OUTPATIENT
Start: 2022-06-16 | End: 2022-06-26

## 2022-06-16 NOTE — PROGRESS NOTES
Patient seen in clinic for 2 week post op s/p L4- S1 TLIF  with Dr Rhodes 06/02/2022              Incisions on Lumbar spine  assessed, removed staples, patient tolerated well, no redness, swelling or drainage, edges well approximated.     Patient was instructed as follows:    Discontinue Bacitracin after tonight.   May shower normally but pat dry after shower.   Do not submerge wound in bath tub or go swimming until released by the physician   Keep incision clean, dry and open to air as much as possible.   Patient encouraged to walk as much as possible but advised to walk with family member or friend and rest as necessary.   No lifting >10lbs.   Avoid bending and twisting the area of your surgery more than 45 degrees from neutral position in any direction.   Return to work will be determined on an individual basis.   No driving or operating machinery while taking narcotic pain medication or muscle relaxants and until cleared by a surgeon.   Wear brace at all times except when lay flat in bed.    All questions were answered. Patient will follow up with Dr Rhodes 07/12/2022 with xrays first. Patient was encouraged to call clinic with any future concerns prior to follow up appt. If any worsening symptoms, patient should report to ED.       Enid Herrera RN, BSN  Neurosurgery Nurse Navigator

## 2022-06-17 ENCOUNTER — OFFICE VISIT (OUTPATIENT)
Dept: PSYCHIATRY | Facility: OTHER | Age: 47
End: 2022-06-17
Payer: COMMERCIAL

## 2022-06-17 DIAGNOSIS — G89.4 CHRONIC PAIN SYNDROME: ICD-10-CM

## 2022-06-17 DIAGNOSIS — F43.21 ADJUSTMENT DISORDER WITH DEPRESSED MOOD: Primary | ICD-10-CM

## 2022-06-17 PROCEDURE — 90837 PR PSYCHOTHERAPY W/PATIENT, 60 MIN: ICD-10-PCS | Mod: FQ,95,, | Performed by: SOCIAL WORKER

## 2022-06-17 PROCEDURE — 90837 PSYTX W PT 60 MINUTES: CPT | Mod: FQ,95,, | Performed by: SOCIAL WORKER

## 2022-06-17 NOTE — PROGRESS NOTES
"Established Patient - Audio Only Telehealth Visit     The patient location is: Home s/p surgery  The chief complaint leading to consultation is: anxiety and depression  Visit type: Virtual visit with audio only (telephone)  Total time spent with patient: 50 mins       The reason for the audio only service rather than synchronous audio and video virtual visit was related to technical difficulties or patient preference/necessity.     Each patient to whom I provide medical services by telemedicine is:  (1) informed of the relationship between the physician and patient and the respective role of any other health care provider with respect to management of the patient; and (2) notified that they may decline to receive medical services by telemedicine and may withdraw from such care at any time. Patient verbally consented to receive this service via voice-only telephone call.       Type of service: Individual    Content of session: Pt notes he is having surgical pain and the same pain as he was having before surgery. Notes he is taking little walks up and down the sidewalk. Pt states he is staying at his sisters house. Notes mood has been "depressed a little bit, up and down. I lay in the bed the most of the day because of the pain". Seeing PCP next Wednesday, will also go back in July to start doing therapy at [facility] in [place]. Pt states "I have a cage and 6 metal plates in my back, I don't know if I'm going to be able to go back to work". Notes this is a big stressor for him. "I just want to get back to my normal self", notes it's hard for him to move and his right leg is bothering him a lot. Pt states "I'm all over the place with anxiety, I just want to get back to normal". Discussed how anxiety and depression could be related to post-surgical state, will need to give a few more weeks in recovery to see what may still be a presenting issue. Pt agrees, will reach out to schedule next visit.       Therapeutic " techniques and approaches: behavior modification and supportive counseling. Rationale: appropriate for presenting issues.     Pt denies SI/HI. Mood was euthymic, affect matches verbal content. AAOx3, participated fully in session.     Time spent in counselinmins                           This service was not originating from a related E/M service provided within the previous 7 days nor will  to an E/M service or procedure within the next 24 hours or my soonest available appointment.  Prevailing standard of care was able to be met in this audio-only visit.

## 2022-06-22 ENCOUNTER — OFFICE VISIT (OUTPATIENT)
Dept: INTERNAL MEDICINE | Facility: CLINIC | Age: 47
End: 2022-06-22
Payer: MEDICAID

## 2022-06-22 VITALS
HEART RATE: 119 BPM | OXYGEN SATURATION: 95 % | BODY MASS INDEX: 26.77 KG/M2 | DIASTOLIC BLOOD PRESSURE: 90 MMHG | WEIGHT: 187 LBS | HEIGHT: 70 IN | SYSTOLIC BLOOD PRESSURE: 120 MMHG

## 2022-06-22 DIAGNOSIS — G89.29 OTHER CHRONIC PAIN: ICD-10-CM

## 2022-06-22 DIAGNOSIS — Z09 HOSPITAL DISCHARGE FOLLOW-UP: Primary | ICD-10-CM

## 2022-06-22 DIAGNOSIS — F32.A DEPRESSION, UNSPECIFIED DEPRESSION TYPE: ICD-10-CM

## 2022-06-22 DIAGNOSIS — Z98.1 S/P LUMBAR FUSION: ICD-10-CM

## 2022-06-22 PROCEDURE — 3008F BODY MASS INDEX DOCD: CPT | Mod: CPTII,,, | Performed by: PHYSICIAN ASSISTANT

## 2022-06-22 PROCEDURE — 99999 PR PBB SHADOW E&M-EST. PATIENT-LVL IV: CPT | Mod: PBBFAC,,, | Performed by: PHYSICIAN ASSISTANT

## 2022-06-22 PROCEDURE — 99999 PR PBB SHADOW E&M-EST. PATIENT-LVL IV: ICD-10-PCS | Mod: PBBFAC,,, | Performed by: PHYSICIAN ASSISTANT

## 2022-06-22 PROCEDURE — 3080F PR MOST RECENT DIASTOLIC BLOOD PRESSURE >= 90 MM HG: ICD-10-PCS | Mod: CPTII,,, | Performed by: PHYSICIAN ASSISTANT

## 2022-06-22 PROCEDURE — 1111F PR DISCHARGE MEDS RECONCILED W/ CURRENT OUTPATIENT MED LIST: ICD-10-PCS | Mod: CPTII,,, | Performed by: PHYSICIAN ASSISTANT

## 2022-06-22 PROCEDURE — 3074F SYST BP LT 130 MM HG: CPT | Mod: CPTII,,, | Performed by: PHYSICIAN ASSISTANT

## 2022-06-22 PROCEDURE — 1160F RVW MEDS BY RX/DR IN RCRD: CPT | Mod: CPTII,,, | Performed by: PHYSICIAN ASSISTANT

## 2022-06-22 PROCEDURE — 1159F MED LIST DOCD IN RCRD: CPT | Mod: CPTII,,, | Performed by: PHYSICIAN ASSISTANT

## 2022-06-22 PROCEDURE — 3080F DIAST BP >= 90 MM HG: CPT | Mod: CPTII,,, | Performed by: PHYSICIAN ASSISTANT

## 2022-06-22 PROCEDURE — 99213 PR OFFICE/OUTPT VISIT, EST, LEVL III, 20-29 MIN: ICD-10-PCS | Mod: S$PBB,,, | Performed by: PHYSICIAN ASSISTANT

## 2022-06-22 PROCEDURE — 1111F DSCHRG MED/CURRENT MED MERGE: CPT | Mod: CPTII,,, | Performed by: PHYSICIAN ASSISTANT

## 2022-06-22 PROCEDURE — 1160F PR REVIEW ALL MEDS BY PRESCRIBER/CLIN PHARMACIST DOCUMENTED: ICD-10-PCS | Mod: CPTII,,, | Performed by: PHYSICIAN ASSISTANT

## 2022-06-22 PROCEDURE — 1159F PR MEDICATION LIST DOCUMENTED IN MEDICAL RECORD: ICD-10-PCS | Mod: CPTII,,, | Performed by: PHYSICIAN ASSISTANT

## 2022-06-22 PROCEDURE — 3074F PR MOST RECENT SYSTOLIC BLOOD PRESSURE < 130 MM HG: ICD-10-PCS | Mod: CPTII,,, | Performed by: PHYSICIAN ASSISTANT

## 2022-06-22 PROCEDURE — 99213 OFFICE O/P EST LOW 20 MIN: CPT | Mod: S$PBB,,, | Performed by: PHYSICIAN ASSISTANT

## 2022-06-22 PROCEDURE — 99214 OFFICE O/P EST MOD 30 MIN: CPT | Mod: PBBFAC | Performed by: PHYSICIAN ASSISTANT

## 2022-06-22 PROCEDURE — 3008F PR BODY MASS INDEX (BMI) DOCUMENTED: ICD-10-PCS | Mod: CPTII,,, | Performed by: PHYSICIAN ASSISTANT

## 2022-06-22 RX ORDER — HYDROCODONE BITARTRATE AND ACETAMINOPHEN 7.5; 325 MG/1; MG/1
1 TABLET ORAL EVERY 6 HOURS PRN
Qty: 28 TABLET | Refills: 0 | Status: CANCELLED | OUTPATIENT
Start: 2022-06-22

## 2022-06-22 RX ORDER — DIAZEPAM 5 MG/1
5 TABLET ORAL EVERY 6 HOURS PRN
Qty: 60 TABLET | Refills: 0 | Status: CANCELLED | OUTPATIENT
Start: 2022-06-22 | End: 2022-07-22

## 2022-06-22 NOTE — PROGRESS NOTES
Subjective:       Patient ID: Junior Garay is a 47 y.o. male.    Chief Complaint: Follow-up    HPI     Established pt of Mark Littlejohn MD (new to me)    Pt attended by his fiance.    Pt here for follow up after starting Cymbalta for depression about one month ago  He is following with NSGY, [employer] injury. He underwent lumbar fusion on 6/2.   He reports continued pain. Hasn't started physical therapy yet, pt reports he is awaiting approval from [employer]. Slowing ambulating in his house to the mailbox. Inquires about valium and norco rx refill. Flexeril too drowsy.    In regard to mood he states, mood has improved on Cymbalta, he initially stopped the med but decided to restart. He continue to follow with therapist. No SI/HI        Admission Date: 6/2/2022  Hospital Length of Stay: 7 days  Discharge Date and Time: 6/9/2022        Procedure(s) (LRB):  FUSION, SPINE, LUMBAR, TLIF, MINIMALLY INVASIVE L4-5, L5-S1 (N/A)      Hospital Course: 6/4: NAEON. AFVSS. Feels improved strength this AM. Pain better controlled.   6/5: NAEON. AFVSS. Exam stable. Pain controlled. Tolerating PO. Voiding. Sat in bedside chair yesterday.   6/6: NAEON. VSS. Pt with nausea and HA with standing this am. BP remains stable. PRN zofran given. Pt denies positional HA. Reports the pain medication is contributing to his nausea but he continues to report right hip pain that radiates into his thigh on the right. He has been OOB with walker to the bathroom this am. Denies new numbness or paresthesias.   6/7: Pain is better controlled today, however he continues with nausea overnight. Denies emesis or positional HA. VSS. He did not receive much pain medication overnight. Hip Xray without acute process. IPR pending   6/8: NAEON. VSS. Nausea resolved. Adjusted pain regimen today. He is requesting to go home and is uninterested in rehab placement. He remains neuro stable on exam.  6/9: NAEON. AFVSS. Pt doing well, pain relatively  "controlled on current regimen. Reports nausea has resolved since stopping gabapentin and robaxin. Neuro exam stable. PT/OT recs updated to . Medically ready for discharge. Discussed postop and discharge instructions, all questions answered, 2 week postop appointment scheduled. Encouraged to call our office with any questions or concerns.      Past Medical History:   Diagnosis Date    Anxiety     Depression     related to current illness    Seizures     Had seizures as a child     Social History     Tobacco Use    Smoking status: Former Smoker     Packs/day: 0.50     Years: 10.00     Pack years: 5.00     Types: Cigarettes    Smokeless tobacco: Never Used    Tobacco comment: Stopped 2 weeks ago   Substance Use Topics    Alcohol use: No     Comment: socially- wedding or party    Drug use: No     Review of patient's allergies indicates:  No Known Allergies      Review of Systems   Constitutional: Negative for chills, diaphoresis and fever.   Respiratory: Negative for cough and shortness of breath.    Cardiovascular: Negative for chest pain and leg swelling.   Gastrointestinal: Negative for abdominal pain, constipation, diarrhea, nausea and vomiting.   Musculoskeletal: Positive for back pain and leg pain.         Objective: BP (!) 120/90 (BP Location: Right arm, Patient Position: Sitting, BP Method: Medium (Manual))   Pulse (!) 119   Ht 5' 10" (1.778 m)   Wt 84.8 kg (187 lb)   SpO2 95%   BMI 26.83 kg/m²         Physical Exam  Vitals reviewed.   Constitutional:       General: He is not in acute distress.     Appearance: He is well-developed.   HENT:      Head: Normocephalic and atraumatic.   Cardiovascular:      Rate and Rhythm: Normal rate and regular rhythm.      Heart sounds: No murmur heard.  Pulmonary:      Effort: Pulmonary effort is normal.      Breath sounds: Normal breath sounds. No wheezing or rales.   Musculoskeletal:      Right lower leg: No edema.      Left lower leg: No edema.      Comments: " Lumbar brace in place  Ambulating with walker  incision healing, no soi   Skin:     General: Skin is warm and dry.      Findings: No rash.   Neurological:      Mental Status: He is alert.         Assessment:       Problem List Items Addressed This Visit        Psychiatric    Depression      Other Visit Diagnoses     Hospital discharge follow-up    -  Primary    Other chronic pain        S/P lumbar fusion              Plan:         Junior was seen today for follow-up.    Diagnoses and all orders for this visit:    Hospital discharge follow-up    Depression, unspecified depression type  Improved continue cymbalta and therapy with functional restorative prgm    Other chronic pain  S/P lumbar fusion  Pt advised to follow up with NSGY in regards to post-op pain control  Has few Norco left.         Discussed need to choose MD as PCP to fully establish care      Apolonia Rodrigues PA-C

## 2022-06-23 ENCOUNTER — PATIENT MESSAGE (OUTPATIENT)
Dept: NEUROSURGERY | Facility: CLINIC | Age: 47
End: 2022-06-23
Payer: MEDICAID

## 2022-06-24 RX ORDER — HYDROCODONE BITARTRATE AND ACETAMINOPHEN 7.5; 325 MG/1; MG/1
1 TABLET ORAL EVERY 6 HOURS
Qty: 28 TABLET | Refills: 0 | Status: SHIPPED | OUTPATIENT
Start: 2022-06-24 | End: 2022-07-01

## 2022-06-24 NOTE — PROGRESS NOTES
Patient not tolerating muscle relaxers due to sedation and nausea. Patient tolerating Norco 7.5 q6h with pain relief. Will prescribe additional 7 days for post op pain control.

## 2022-07-05 ENCOUNTER — PATIENT MESSAGE (OUTPATIENT)
Dept: NEUROSURGERY | Facility: CLINIC | Age: 47
End: 2022-07-05
Payer: MEDICAID

## 2022-07-05 DIAGNOSIS — Z98.1 S/P LUMBAR SPINAL FUSION: Primary | ICD-10-CM

## 2022-07-05 NOTE — TELEPHONE ENCOUNTER
----- Message from Natalie Jones sent at 7/5/2022 12:40 PM CDT -----  Regarding: Call  Contact: Patient  Type: Patient Call Back    Who called:Patient    What is the request in detail: Patient is requesting a call back. He states he needs his medication for pain and also has not yet started therapy. He states he needs to be contacted asap due to the pain. He is requesting [place]. Please advise.    Can the clinic reply by [facility]? No    Would the patient rather a call back or a response via [facility]? Call    Best call back number: [phone]    Additional Information:     Patient needs refill on pain med sent to pharmacy listed below.    [id number] - [place] - [address] AT [facility]  [address] [place] [address]  Phone: [phone] Fax: [phone]    Thanks

## 2022-07-06 RX ORDER — HYDROCODONE BITARTRATE AND ACETAMINOPHEN 5; 325 MG/1; MG/1
1 TABLET ORAL EVERY 6 HOURS PRN
Qty: 56 TABLET | Refills: 0 | Status: SHIPPED | OUTPATIENT
Start: 2022-07-06 | End: 2022-07-20 | Stop reason: SDUPTHER

## 2022-07-06 RX ORDER — CYCLOBENZAPRINE HCL 10 MG
10 TABLET ORAL 3 TIMES DAILY PRN
Qty: 60 TABLET | Refills: 0 | OUTPATIENT
Start: 2022-07-06 | End: 2022-07-16

## 2022-07-06 RX ORDER — CYCLOBENZAPRINE HYDROCHLORIDE 7.5 MG/1
7.5 TABLET, FILM COATED ORAL 3 TIMES DAILY PRN
Qty: 40 TABLET | Refills: 0 | Status: SHIPPED | OUTPATIENT
Start: 2022-07-06 | End: 2022-07-16

## 2022-07-06 RX ORDER — HYDROCODONE BITARTRATE AND ACETAMINOPHEN 7.5; 325 MG/1; MG/1
1 TABLET ORAL EVERY 6 HOURS PRN
Qty: 28 TABLET | Refills: 0 | OUTPATIENT
Start: 2022-07-06 | End: 2022-08-03

## 2022-07-06 NOTE — OP NOTE
Asif Pugh - Neurosurgery ([facility])  Neurosurgery  Operative Note    OP Note      Date of Procedure: 6/2/2022       Pre-Operative Diagnosis: Lumbar spondylosis [M47.816]    Post-Operative Diagnosis: Post-Op Diagnosis Codes:     * Lumbar spondylosis [M47.816]    Anesthesia: General    Procedures performed:1.  Posterior approach would decompressive laminectomy and decompression of L4-L5 and L5-S1 2. Is complete facetectomy and combined interbody and posterolateral instrumented fusion of L4-L5  3. Posterolateral instrumented fusion of L5-S1 with segmented hardware placement 4. Morselized allograft and local autograft 5. Use of spinal navigation with robotic 6. Use microsurgical technique     Surgeon: Humberto Rhodes MD    Assistant::  Ra Vasquez MD and Taz Kruse MD    Indication for Procedure:  This is a patient with work-related exacerbation of L4-5 and L5-S1 disc disease that failed conservative management.  We felt pen patient would benefit from surgical intervention.    Operative Note:  Patient undergone preoperative neuro navigation CT scan there was registered up to the navigation robotic system.  Patient was anesthetized intubated by anesthesia.  Neuro monitor was put in place.  Sandoval catheter put in place.  Patient was flipped from the prone position on a 4 post Tulio table.  We then made a small incision over the iliac crest and then placed a reference array into position.  Navigation system was registered after a in AP and lateral fluoroscopy was obtained and merged.  After we were happy with the navigation merge we then placed the robot in neutral position we plan all 6 of the pedicle screws on the navigation system in order to get maximum purchase and good alignment.  We used robotic navigation to the navigate and then cannulated the bilateral pedicle screws at L4, L5 and S1.  Once pedicle screws were in position we then used the navigation system to place the metrics tube down at L4-5 used  microscope and microsurgical technique to initially perform a decompressive laminectomy in decompress the thecal sac and neural foramen and removed the tube out laterally performing complete facetectomy and then found the exiting and descending nerve root protected perform complete L4-5 diskectomy then prepared the endplate placed peek interbody device with morselized allograft into position at L4-5 and then the cord out laterally to the TP post place posterolateral fusion out in TP L4-5.  We put a metrics tube down to L5-S1 this point we did not feel patient needed interbody graft at that point so just did posterolateral decortication and posterolateral instrumentation with morselized allograft and local autograft from the facetectomy.  Once were happy with this we then placed bilateral rods in position secured it in to place with end caps after testing the screws with neuro monitoring.  All the screws stimulated at above threshold.  Neuro monitor was stable throughout the case.  Once were happy that we irrigated out the wound placed vancomycin powder closed the wound in layers sterile dressing was put in place patient was extubated Sandoval was removed patient was taken to recovery room without any problems or complication.    EBL:  50 cc  Specimen Sent:  None

## 2022-07-06 NOTE — PHYSICIAN QUERY
PT Name: Junior Garay  MR #: [id number]    DOCUMENTATION CLARIFICATION     CDS/: Luly Davenport RN, CDS               Contact information:  [email or web address]    This form is a permanent document in the medical record.     Query Date: July 6, 2022    Dear Provider,  By submitting this query, we are merely seeking further clarification of documentation. Please utilize your independent clinical judgment when addressing the question(s) below.    The medical record contains the following:  Supporting Clinical Findings Location in Medical Record     --POD 1 s/p L4-5 TLIF with L5-S1 posterior fixation. HOB flat until 6/4. LSO brace ordered. Pt with uncontrolled pain to his low back with complaint of radicular R leg pain and hip pain bilaterally. Morphine and lidocaine patches added to regimen.  ----HOB flat due to intra-op CSF leak until 6/4    ----HOB flat due to intra-op CSF leak until 6/4; completed  ----CT Lsp 6/3 with small amount of bone matrix and mild narrowing R L5 ventrolateral aspect of central canal.    --decompressive laminectomy in decompress the thecal sac and neural foramen and removed the tube out laterally performing complete facetectomy and then found the exiting and descending nerve root protected perform complete L4-5 diskectomy then prepared the endplate placed peek interbody device with morselized allograft into position at L4-5 and then the cord out laterally to the TP post place posterolateral fusion out in TP L4-5.  --patient was extubated Sandoval was removed patient was taken to recovery room without any problems or complication.   NeuroSx Note 6/3          NeuroSx 6/4        Op-Note 6/2       Please clarify if __intra-op CSF leak_ (as it relates to __ L4-5 TLIF with L5-S1 posterior fixation___) is:      [ X ] Complication of the procedure   [  ] Present, but not a complication of the procedure   [  ] Incidental finding, not clinically significant   [  ] Ruled Out   [   ] Other (please specify): __________________   [  ] Clinically Undetermined       Please document in your progress notes daily for the duration of treatment until resolved and include in your discharge summary.

## 2022-07-12 ENCOUNTER — OFFICE VISIT (OUTPATIENT)
Dept: NEUROSURGERY | Facility: CLINIC | Age: 47
End: 2022-07-12
Payer: MEDICAID

## 2022-07-12 ENCOUNTER — HOSPITAL ENCOUNTER (OUTPATIENT)
Dept: RADIOLOGY | Facility: HOSPITAL | Age: 47
Discharge: HOME | End: 2022-07-12
Attending: NEUROLOGICAL SURGERY
Payer: MEDICAID

## 2022-07-12 VITALS — TEMPERATURE: 96 F

## 2022-07-12 DIAGNOSIS — Z98.1 S/P LUMBAR SPINAL FUSION: ICD-10-CM

## 2022-07-12 DIAGNOSIS — M46.1 SACROILIITIS: Primary | ICD-10-CM

## 2022-07-12 PROCEDURE — 1159F MED LIST DOCD IN RCRD: CPT | Mod: CPTII,,, | Performed by: PHYSICIAN ASSISTANT

## 2022-07-12 PROCEDURE — 99999 PR PBB SHADOW E&M-EST. PATIENT-LVL II: CPT | Mod: PBBFAC,,, | Performed by: PHYSICIAN ASSISTANT

## 2022-07-12 PROCEDURE — 99999 PR PBB SHADOW E&M-EST. PATIENT-LVL II: ICD-10-PCS | Mod: PBBFAC,,, | Performed by: PHYSICIAN ASSISTANT

## 2022-07-12 PROCEDURE — 1159F PR MEDICATION LIST DOCUMENTED IN MEDICAL RECORD: ICD-10-PCS | Mod: CPTII,,, | Performed by: PHYSICIAN ASSISTANT

## 2022-07-12 PROCEDURE — 72100 XR LUMBAR SPINE AP AND LATERAL: ICD-10-PCS | Mod: 26,,, | Performed by: RADIOLOGY

## 2022-07-12 PROCEDURE — 99024 POSTOP FOLLOW-UP VISIT: CPT | Mod: ,,, | Performed by: PHYSICIAN ASSISTANT

## 2022-07-12 PROCEDURE — 99024 PR POST-OP FOLLOW-UP VISIT: ICD-10-PCS | Mod: ,,, | Performed by: PHYSICIAN ASSISTANT

## 2022-07-12 PROCEDURE — 99212 OFFICE O/P EST SF 10 MIN: CPT | Mod: PBBFAC | Performed by: PHYSICIAN ASSISTANT

## 2022-07-12 PROCEDURE — 72100 X-RAY EXAM L-S SPINE 2/3 VWS: CPT | Mod: 26,,, | Performed by: RADIOLOGY

## 2022-07-12 PROCEDURE — 72100 X-RAY EXAM L-S SPINE 2/3 VWS: CPT | Mod: TC

## 2022-07-12 NOTE — H&P (VIEW-ONLY)
Neurosurgery  Established Patient    SUBJECTIVE:     History of Present Illness:  47 year old male s/p L4-5, L5-S1 TLIF on 6/2/22 who presents today for 6 week psot-op follow up. He reports his back pain is improving but he continues with right hip pain that radiates into his right buttocks and right anterior thigh. It does not radiate past the knee. He reports the pain is worsened with sitting on the right buttocks. He did have a fall out of the bed onto his left hip after discharge which he attributes to right leg numbness. He denies left leg pain. He admits to little activity since discharge and states he sits most of the day. He has not been getting home health therapy due to insurance issues and refused transfer to rehab post-op. Denies new weakness or Bowel or bladder issues. CT lumbar spine obtained post-op during his admission showed small amount of bone matrix and mild narrowing R L5 ventrolateral aspect of central canal.     Review of patient's allergies indicates:  No Known Allergies    Current Outpatient Medications   Medication Sig Dispense Refill    DULoxetine (CYMBALTA) 30 MG capsule TAKE 1 CAPSULE(30 MG) BY MOUTH EVERY DAY 30 capsule 1    acetaminophen (TYLENOL) 500 MG tablet Take 1 tablet (500 mg total) by mouth 2 (two) times a day. (Patient not taking: No sig reported) 60 tablet 0    diazePAM (VALIUM) 5 MG tablet Take 1 tablet (5 mg total) by mouth every 6 (six) hours as needed (muscle spasm). 60 tablet 0    HYDROcodone-acetaminophen (NORCO) 5-325 mg per tablet Take 1 tablet by mouth every 8 (eight) hours as needed for Pain. 56 tablet 0    ondansetron (ZOFRAN-ODT) 4 MG TbDL Dissolve 2 tablets (8 mg total) by mouth every 8 (eight) hours as needed (nausea). (Patient not taking: No sig reported) 30 tablet 0     No current facility-administered medications for this visit.       Past Medical History:   Diagnosis Date    Anxiety     Depression     related to current illness    Seizures     Had  seizures as a child     Past Surgical History:   Procedure Laterality Date    ABDOMINAL SURGERY      APPENDECTOMY      BACK SURGERY      COLON SURGERY      MINIMALLY INVASIVE TRANSFORAMINAL LUMBAR INTERBODY FUSION (TLIF) N/A 6/2/2022    Procedure: FUSION, SPINE, LUMBAR, TLIF, MINIMALLY INVASIVE L4-5, L5-S1;  Surgeon: Humberto Rhodes MD;  Location: [facility] OR [address] [place] [address];  Service: Neurosurgery;  Laterality: N/A;  [place] Table 4 poster, prone, neuromonitoring, globus robot, Heaven Giron     TRANSFORAMINAL EPIDURAL INJECTION OF STEROID Right 07/12/2021    Procedure: LUMBAR TRANSFORAMINAL RIGHT L4/5 DIRECT REFERRAL NEEDS CONSENT;  Surgeon: Ramya Lundy MD;  Location: [facility];  Service: Pain Management;  Laterality: Right;     Family History     Problem Relation (Age of Onset)    Cancer Mother    Heart attack Mother    Suicide Father        Social History     Socioeconomic History    Marital status:     Number of children: 7   Tobacco Use    Smoking status: Former Smoker     Packs/day: 0.50     Years: 10.00     Pack years: 5.00     Types: Cigarettes    Smokeless tobacco: Never Used    Tobacco comment: Stopped 2 weeks ago   Substance and Sexual Activity    Alcohol use: No     Comment: socially- wedding or party    Drug use: No    Sexual activity: Not Currently     Partners: Female   Social History Narrative    Stairs- none       Review of Systems    OBJECTIVE:     Vital Signs  Temp: 96.4 °F (35.8 °C)  Pain Score:   7  There is no height or weight on file to calculate BMI.    Neurosurgery Physical Exam  General: well developed, well nourished, no distress.   Head: normocephalic, atraumatic  Neurologic: Alert and oriented. Thought content appropriate.  GCS: Motor: 6/Verbal: 5/Eyes: 4 GCS Total: 15  Mental Status: Awake, Alert, Oriented x3  Cranial nerves: face symmetric, tongue midline, CN II-XII grossly intact.   Eyes: pupils equal, round, reactive to light with accomodation, EOMI.    Sensory: intact to  light touch throughout  Motor Strength:Moves all extremities spontaneously with good tone. No abnormal movements seen. Pain limited RLE exam      Strength   Deltoids Triceps Biceps Wrist Extension Wrist Flexion Hand    Upper: R 5/5 5/5 5/5 5/5 5/5 5/5     L 5/5 5/5 5/5 5/5 5/5 5/5       Iliopsoas Quadriceps Knee  Flexion Tibialis  anterior Gastro- cnemius EHL   Lower: R 4-/5 4/5 4/5 4+/5 4/5 5/5     L 5/5 5/5 5/5 5/5 5/5 5/5      SI joint TTP on the right  GTB tenderness on the right  Clonus: absent  Incisions clean dry and intact with skin edges well approximated with staples.       Diagnostic Results:  None new    ASSESSMENT/PLAN:     47 year old male s/p L4-5, L5-S1 TLIF on 6/2/22 who presents today for 6 week post-op follow up. He continues with right hip/buttocks pain post-op. He is acutely tender on exam in the right SI and GTB. I will refer him to pain management for a trial of steroid injections and refill his muscle relaxers. I will get an xray today to evaluate the hardware given his recent fall and would consider repeat MRI if pain persists. I discussed the importance of advancing his activity and would like to get him involved with Physical therapy for strengthening. We will plan to see him back at 3 months post-op to reassess.     All symptoms and signs that require emergent or urgent treatment were reviewed. The plan was discussed with the patient. All of the the patient's questions and concerns were answered and he voiced understanding. Please feel free to call with any further questions.       Catalina Wolff PA-C  Neurosurgery     Note dictated with voice recognition software, please excuse any grammatical errors.

## 2022-07-12 NOTE — PROGRESS NOTES
Neurosurgery  Established Patient    SUBJECTIVE:     History of Present Illness:  47 year old male s/p L4-5, L5-S1 TLIF on 6/2/22 who presents today for 6 week psot-op follow up. He reports his back pain is improving but he continues with right hip pain that radiates into his right buttocks and right anterior thigh. It does not radiate past the knee. He reports the pain is worsened with sitting on the right buttocks. He did have a fall out of the bed onto his left hip after discharge which he attributes to right leg numbness. He denies left leg pain. He admits to little activity since discharge and states he sits most of the day. He has not been getting home health therapy due to insurance issues and refused transfer to rehab post-op. Denies new weakness or Bowel or bladder issues. CT lumbar spine obtained post-op during his admission showed small amount of bone matrix and mild narrowing R L5 ventrolateral aspect of central canal.     Review of patient's allergies indicates:  No Known Allergies    Current Outpatient Medications   Medication Sig Dispense Refill    DULoxetine (CYMBALTA) 30 MG capsule TAKE 1 CAPSULE(30 MG) BY MOUTH EVERY DAY 30 capsule 1    acetaminophen (TYLENOL) 500 MG tablet Take 1 tablet (500 mg total) by mouth 2 (two) times a day. (Patient not taking: No sig reported) 60 tablet 0    diazePAM (VALIUM) 5 MG tablet Take 1 tablet (5 mg total) by mouth every 6 (six) hours as needed (muscle spasm). 60 tablet 0    HYDROcodone-acetaminophen (NORCO) 5-325 mg per tablet Take 1 tablet by mouth every 8 (eight) hours as needed for Pain. 56 tablet 0    ondansetron (ZOFRAN-ODT) 4 MG TbDL Dissolve 2 tablets (8 mg total) by mouth every 8 (eight) hours as needed (nausea). (Patient not taking: No sig reported) 30 tablet 0     No current facility-administered medications for this visit.       Past Medical History:   Diagnosis Date    Anxiety     Depression     related to current illness    Seizures     Had  seizures as a child     Past Surgical History:   Procedure Laterality Date    ABDOMINAL SURGERY      APPENDECTOMY      BACK SURGERY      COLON SURGERY      MINIMALLY INVASIVE TRANSFORAMINAL LUMBAR INTERBODY FUSION (TLIF) N/A 6/2/2022    Procedure: FUSION, SPINE, LUMBAR, TLIF, MINIMALLY INVASIVE L4-5, L5-S1;  Surgeon: Humberto Rhodes MD;  Location: [facility] OR [address] [place] [address];  Service: Neurosurgery;  Laterality: N/A;  [place] Table 4 poster, prone, neuromonitoring, globus robot, Heaven Giron     TRANSFORAMINAL EPIDURAL INJECTION OF STEROID Right 07/12/2021    Procedure: LUMBAR TRANSFORAMINAL RIGHT L4/5 DIRECT REFERRAL NEEDS CONSENT;  Surgeon: Ramya Lundy MD;  Location: [facility];  Service: Pain Management;  Laterality: Right;     Family History     Problem Relation (Age of Onset)    Cancer Mother    Heart attack Mother    Suicide Father        Social History     Socioeconomic History    Marital status:     Number of children: 7   Tobacco Use    Smoking status: Former Smoker     Packs/day: 0.50     Years: 10.00     Pack years: 5.00     Types: Cigarettes    Smokeless tobacco: Never Used    Tobacco comment: Stopped 2 weeks ago   Substance and Sexual Activity    Alcohol use: No     Comment: socially- wedding or party    Drug use: No    Sexual activity: Not Currently     Partners: Female   Social History Narrative    Stairs- none       Review of Systems    OBJECTIVE:     Vital Signs  Temp: 96.4 °F (35.8 °C)  Pain Score:   7  There is no height or weight on file to calculate BMI.    Neurosurgery Physical Exam  General: well developed, well nourished, no distress.   Head: normocephalic, atraumatic  Neurologic: Alert and oriented. Thought content appropriate.  GCS: Motor: 6/Verbal: 5/Eyes: 4 GCS Total: 15  Mental Status: Awake, Alert, Oriented x3  Cranial nerves: face symmetric, tongue midline, CN II-XII grossly intact.   Eyes: pupils equal, round, reactive to light with accomodation, EOMI.    Sensory: intact to  light touch throughout  Motor Strength:Moves all extremities spontaneously with good tone. No abnormal movements seen. Pain limited RLE exam      Strength   Deltoids Triceps Biceps Wrist Extension Wrist Flexion Hand    Upper: R 5/5 5/5 5/5 5/5 5/5 5/5     L 5/5 5/5 5/5 5/5 5/5 5/5       Iliopsoas Quadriceps Knee  Flexion Tibialis  anterior Gastro- cnemius EHL   Lower: R 4-/5 4/5 4/5 4+/5 4/5 5/5     L 5/5 5/5 5/5 5/5 5/5 5/5      SI joint TTP on the right  GTB tenderness on the right  Clonus: absent  Incisions clean dry and intact with skin edges well approximated with staples.       Diagnostic Results:  None new    ASSESSMENT/PLAN:     47 year old male s/p L4-5, L5-S1 TLIF on 6/2/22 who presents today for 6 week post-op follow up. He continues with right hip/buttocks pain post-op. He is acutely tender on exam in the right SI and GTB. I will refer him to pain management for a trial of steroid injections and refill his muscle relaxers. I will get an xray today to evaluate the hardware given his recent fall and would consider repeat MRI if pain persists. I discussed the importance of advancing his activity and would like to get him involved with Physical therapy for strengthening. We will plan to see him back at 3 months post-op to reassess.     All symptoms and signs that require emergent or urgent treatment were reviewed. The plan was discussed with the patient. All of the the patient's questions and concerns were answered and he voiced understanding. Please feel free to call with any further questions.       Catalina Wolff PA-C  Neurosurgery     Note dictated with voice recognition software, please excuse any grammatical errors.

## 2022-07-13 ENCOUNTER — PATIENT MESSAGE (OUTPATIENT)
Dept: PAIN MEDICINE | Facility: OTHER | Age: 47
End: 2022-07-13
Payer: MEDICAID

## 2022-07-13 DIAGNOSIS — M46.1 SACROILIITIS: Primary | ICD-10-CM

## 2022-07-20 ENCOUNTER — PATIENT MESSAGE (OUTPATIENT)
Dept: NEUROSURGERY | Facility: CLINIC | Age: 47
End: 2022-07-20
Payer: MEDICAID

## 2022-07-20 RX ORDER — HYDROCODONE BITARTRATE AND ACETAMINOPHEN 5; 325 MG/1; MG/1
1 TABLET ORAL EVERY 8 HOURS PRN
Qty: 56 TABLET | Refills: 0 | Status: SHIPPED | OUTPATIENT
Start: 2022-07-20 | End: 2022-08-29

## 2022-07-20 RX ORDER — DIAZEPAM 5 MG/1
5 TABLET ORAL EVERY 6 HOURS PRN
Qty: 60 TABLET | Refills: 0 | Status: SHIPPED | OUTPATIENT
Start: 2022-07-20 | End: 2022-08-29 | Stop reason: ALTCHOICE

## 2022-07-20 NOTE — TELEPHONE ENCOUNTER
Spoke to patient. I messaged PT this morning, and that is already scheduled. I also told pt the procedure will have to be pushed back to allow approval time, told him I messaged Dr. Lundy staff so they are aware. We also discussed his medications. Pt stated that Valium (muscle relaxer was helping, but the oxycodone was not tolerated. Pt said he needs the Hydrocodone filled and also requested the valium because he said that helped. I have informed Catalina who is working on it.

## 2022-07-21 ENCOUNTER — TELEPHONE (OUTPATIENT)
Dept: SPINE | Facility: CLINIC | Age: 47
End: 2022-07-21
Payer: MEDICAID

## 2022-07-21 NOTE — TELEPHONE ENCOUNTER
----- Message from Rima Burnham sent at 7/21/2022 10:04 AM CDT -----  Regarding: Patient call back  Who called:RODRIGO LONGO [[id number]]    What is the request in detail: Patient is requesting a call back.  He states he was advised that worker's comp did not approve his 7/28 procedure. He would like to know if that status has changed yet.   Please advise.    Can the clinic reply by [facility]? No    Best call back number: [phone]    Additional Information: N/A

## 2022-07-22 ENCOUNTER — PATIENT MESSAGE (OUTPATIENT)
Dept: NEUROSURGERY | Facility: CLINIC | Age: 47
End: 2022-07-22
Payer: MEDICAID

## 2022-07-25 ENCOUNTER — TELEPHONE (OUTPATIENT)
Dept: NEUROSURGERY | Facility: CLINIC | Age: 47
End: 2022-07-25
Payer: MEDICAID

## 2022-07-25 NOTE — TELEPHONE ENCOUNTER
Called - no answer - left pt VM informing him that I sent the last clinic to insurance this morning, so I would think W/C is just waiting to hear back from insurance. I advised him to reach out to W/C department.         ----- Message from Harriet Chaudhry sent at 7/25/2022  2:13 PM CDT -----  Contact: pt  Pt requesting call back re: questions WC paperwork for his procedure on Thursday.     Confirmed contact below:  Contact Name:Junior Garay  Phone Number: [phone]

## 2022-07-26 PROBLEM — R29.898 LEG WEAKNESS, BILATERAL: Status: ACTIVE | Noted: 2022-07-26

## 2022-07-26 PROBLEM — Z74.09 IMPAIRED FUNCTIONAL MOBILITY, BALANCE, GAIT, AND ENDURANCE: Status: ACTIVE | Noted: 2022-07-26

## 2022-07-28 ENCOUNTER — HOSPITAL ENCOUNTER (OUTPATIENT)
Facility: OTHER | Age: 47
Discharge: HOME | End: 2022-07-28
Attending: ANESTHESIOLOGY | Admitting: ANESTHESIOLOGY
Payer: COMMERCIAL

## 2022-07-28 VITALS
WEIGHT: 187 LBS | BODY MASS INDEX: 26.77 KG/M2 | TEMPERATURE: 98 F | HEART RATE: 69 BPM | RESPIRATION RATE: 16 BRPM | HEIGHT: 70 IN | OXYGEN SATURATION: 96 % | SYSTOLIC BLOOD PRESSURE: 134 MMHG | DIASTOLIC BLOOD PRESSURE: 81 MMHG

## 2022-07-28 DIAGNOSIS — G89.29 CHRONIC PAIN: ICD-10-CM

## 2022-07-28 DIAGNOSIS — M51.36 DDD (DEGENERATIVE DISC DISEASE), LUMBAR: ICD-10-CM

## 2022-07-28 DIAGNOSIS — M46.1 SACROILIITIS: Primary | ICD-10-CM

## 2022-07-28 PROCEDURE — 27096 INJECT SACROILIAC JOINT: CPT | Mod: RT | Performed by: ANESTHESIOLOGY

## 2022-07-28 PROCEDURE — 25000003 PHARM REV CODE 250: Performed by: ANESTHESIOLOGY

## 2022-07-28 PROCEDURE — 27096 INJECT SACROILIAC JOINT: CPT | Mod: RT,,, | Performed by: ANESTHESIOLOGY

## 2022-07-28 PROCEDURE — 20610 PR DRAIN/INJECT LARGE JOINT/BURSA: ICD-10-PCS | Mod: 59,RT,, | Performed by: ANESTHESIOLOGY

## 2022-07-28 PROCEDURE — 27096 PR INJECTION,SACROILIAC JOINT: ICD-10-PCS | Mod: RT,,, | Performed by: ANESTHESIOLOGY

## 2022-07-28 PROCEDURE — 20610 DRAIN/INJ JOINT/BURSA W/O US: CPT | Mod: 59,RT | Performed by: ANESTHESIOLOGY

## 2022-07-28 PROCEDURE — 20610 DRAIN/INJ JOINT/BURSA W/O US: CPT | Mod: 59,RT,, | Performed by: ANESTHESIOLOGY

## 2022-07-28 RX ORDER — ALPRAZOLAM 0.5 MG/1
1 TABLET ORAL ONCE
Status: COMPLETED | OUTPATIENT
Start: 2022-07-28 | End: 2022-07-28

## 2022-07-28 RX ORDER — SODIUM CHLORIDE 9 MG/ML
500 INJECTION, SOLUTION INTRAVENOUS CONTINUOUS
Status: DISCONTINUED | OUTPATIENT
Start: 2022-07-28 | End: 2022-07-28 | Stop reason: HOSPADM

## 2022-07-28 RX ADMIN — ALPRAZOLAM 1 MG: 0.5 TABLET ORAL at 09:07

## 2022-07-28 NOTE — DISCHARGE INSTRUCTIONS

## 2022-07-28 NOTE — OP NOTE
Sacroiliac Joint and Greater Trochanteric Bursa Injection under Fluoroscopic Guidance    The procedure, risks, benefits, and options were discussed with the patient. There are no contraindications to the procedure. The patent expressed understanding and agreed to the procedure. Informed written consent was obtained prior to the start of the procedure and can be found in the patient's chart.    PATIENT NAME: Junior Garay   MRN: [id number]     DATE OF PROCEDURE: 07/28/2022    PROCEDURE:   1. Right Sacroiliac Joint Injection under Fluoroscopic Guidance  2. Right Greater Trochanteric Bursa Injection under Fluoroscopic Guidance    PRE-OP DIAGNOSIS: Sacroiliitis [M46.1]    POST-OP DIAGNOSIS: Same    PHYSICIAN: Ramya Lundy MD    ASSISTANTS: Warren Cordero MD    MEDICATIONS INJECTED: Preservative-free Kenalog 40mg with 7cc of Bupivacine 0.25%     LOCAL ANESTHETIC INJECTED: Xylocaine 2%     SEDATION: None    ESTIMATED BLOOD LOSS: None    COMPLICATIONS: None    TECHNIQUE: Time-out was performed to identify the patient and procedure to be performed. With the patient laying in a prone position, the surgical area was prepped and draped in the usual sterile fashion using ChloraPrep and a fenestrated drape. The sacroiliac joint was determined under fluoroscopy guidance. Skin anesthesia was achieved by injecting Lidocaine 2% over the injection site. The sacroiliac joint was then approached with a 25 gauge,  3.5 inch spinal quinke needle that was introduced under fluoroscopic guidance in the AP and Lateral views. Once the needle tip was in the area of the joint, and there was no blood aspiration, contrast dye contrast dye Omnipaque (300mg/mL) was injected to confirm placement and there was no vascular runoff. Fluoroscopic imaging in the AP and lateral views revealed a clear outline of the joint space. 4 mL of the medication mixture listed above was injected slowly intraarticular and jalen-articular. Displacement of the radio opaque  contrast after injection of the medication confirmed that the medication went into the area of the joint. The needles were removed and bleeding was nil. A sterile dressing was applied. No specimens collected. The patient tolerated the procedure well.     TECHNIQUE: Time-out was performed to identify the patient and procedure to be performed. With the patient laying in a prone position, the surgical area was prepped and draped in the usual sterile fashion using ChloraPrep and a fenestrated drape. The area overlying the greater trochanteric bursa was determined under fluoroscopy guidance. Skin anesthesia was achieved by injecting Lidocaine 2% over the injection site. The greater trochanteric bursa was then approached with a 25 gauge, 1.5 inch spinal quinke needle that was introduced under fluoroscopic guidance in the AP view. Once the needle tip was in the area of the bursa, and there was no blood aspiration,  Contrast dye  Omnipaque (300mg/mL) was injected to confirm placement and there was no vascular runoff. 4 mL of the medication mixture listed above was injected slowly. The needles were removed and bleeding was nil. A sterile dressing was applied. No specimens collected. The patient tolerated the procedure well.    PAIN BEFORE THE PROCEDURE: 8/10    PAIN AFTER THE PROCEDURE: 8/10    The patient was monitored after the procedure in the recovery area. They were given post-procedure and discharge instructions to follow at home. The patient was discharged in a stable condition.    Warren Cordero MD    I reviewed and edited the fellow's note. I conducted my own interview and physical examination. I agree with the findings. I was present and supervising all critical portions of the procedure.    Ramya Lundy MD

## 2022-07-28 NOTE — DISCHARGE SUMMARY
Discharge Note  Short Stay      SUMMARY     Admit Date: 7/28/2022    Attending Physician: Ramya Lundy      Discharge Physician: Ramya Lundy      Discharge Date: 7/28/2022 10:09 AM    Procedure(s) (LRB):  INJECTION,SACROILIAC JOINT RIGHT SI & RIGHT GTB (Right)    Final Diagnosis: Sacroiliitis [M46.1]    Disposition: Home or self care    Patient Instructions:   Current Discharge Medication List      CONTINUE these medications which have NOT CHANGED    Details   acetaminophen (TYLENOL) 500 MG tablet Take 1 tablet (500 mg total) by mouth 2 (two) times a day.  Qty: 60 tablet, Refills: 0    Associated Diagnoses: DDD (degenerative disc disease), lumbosacral; Lumbosacral radiculopathy; Lumbar disc disease      diazePAM (VALIUM) 5 MG tablet Take 1 tablet (5 mg total) by mouth every 6 (six) hours as needed (muscle spasm).  Qty: 60 tablet, Refills: 0      DULoxetine (CYMBALTA) 30 MG capsule TAKE 1 CAPSULE(30 MG) BY MOUTH EVERY DAY  Qty: 30 capsule, Refills: 1    Associated Diagnoses: Adjustment disorder with depressed mood; DDD (degenerative disc disease), lumbar      HYDROcodone-acetaminophen (NORCO) 5-325 mg per tablet Take 1 tablet by mouth every 8 (eight) hours as needed for Pain.  Qty: 56 tablet, Refills: 0    Comments: Quantity prescribed more than 7 day supply? Yes, quantity medically necessary      ondansetron (ZOFRAN-ODT) 4 MG TbDL Dissolve 2 tablets (8 mg total) by mouth every 8 (eight) hours as needed (nausea).  Qty: 30 tablet, Refills: 0         STOP taking these medications       gabapentin (NEURONTIN) 300 MG capsule Comments:   Reason for Stopping:                   Discharge Diagnosis: Sacroiliitis [M46.1]  Condition on Discharge: Stable with no complications to procedure   Diet on Discharge: Same as before.  Activity: as per instruction sheet.  Discharge to: Home with a responsible adult.  Follow up: 2-4 weeks       Please call my office or pager at [phone] if experienced any weakness or loss of sensation,  fever > 101.5, pain uncontrolled with oral medications, persistent nausea/vomiting/or diarrhea, redness or drainage from the incisions, or any other worrisome concerns. If physician on call was not reached or could not communicate with our office for any reason please go to the nearest emergency department      Ramya Lundy MD

## 2022-08-04 ENCOUNTER — PATIENT MESSAGE (OUTPATIENT)
Dept: NEUROSURGERY | Facility: CLINIC | Age: 47
End: 2022-08-04
Payer: MEDICAID

## 2022-08-12 NOTE — PATIENT INSTRUCTIONS
Please drink plenty of fluids.  Please get plenty of rest.  Please return here or go to the Emergency Department for any concerns or worsening of condition.  If you were prescribed a narcotic medication, do not drive or operate heavy equipment or machinery while taking these medications.  If you were not prescribed an anti-inflammatory medication, and if you do not have any history of stomach/intestinal ulcers, or kidney disease, or are not taking a blood thinner such as Coumadin, Plavix, Pradaxa, Eloquis, or Xaralta for example, it is OK to take over the counter Ibuprofen or Advil or Motrin or Aleve as directed.  Do not take these medications on an empty stomach.  If you lose control of your bowel and/or bladder, please go to the nearest Emergency Department immediately.  If you lose sensation in between your legs by your genitalia and/or rectum, please go to the nearest Emergency Department immediately.  If you lose control or sensation of any extremity, please go to the nearest Emergency Department immediately.    Moist heat (heating Pad) several times a day to back for relief and comfort.  If you  smoke, please stop smoking.    Please follow up with your primary care doctor or specialist as needed.  Mark Littlejohn MD  [phone]    You must understand that you have received treatment at an Urgent Care facility only, and that you may be  released before all of your medical problems are known or treated. Urgent Care facilities are not equipped to  handle life threatening emergencies. It is recommended that you seek care at an Emergency Department for  further evaluation of worsening or concerning symptoms, or possibly life threatening conditions as  Discussed.    General Neck and Back Pain    Both neck and back pain are usually caused by injury to the muscles or ligaments of the spine. Sometimes the disks that separate each bone of the spine may cause pain by pressing on a nearby nerve. Back and neck pain may  Warm Compresses. appear after a sudden twisting or bending force (such as in a car accident), or sometimes after a simple awkward movement. In either case, muscle spasm is often present and adds to the pain.  Acute neck and back pain usually gets better in 1 to 2 weeks. Pain related to disk disease, arthritis in the spinal joints or spinal stenosis (narrowing of the spinal canal) can become chronic and last for months or years.  Back and neck pain are common problems. Most people feel better in 1 or 2 weeks, and most of the rest in 1 to 2 months. Most people can remain active.  People experience and describe pain differently.  · Pain can be sharp, stabbing, shooting, aching, cramping, or burning  · Movement, standing, bending, lifting, sitting, or walking may worsen the pain  · Pain can be localized to one spot or area, or it can be more generalized  · Pain can spread or radiate upwards, downwards, to the front, or go down your arms  · Muscle spasm may occur.  Most of the time mechanical problems with the muscles or spine cause the pain. it is usually caused by an injury, whether known or not, to the muscles or ligaments. While illnesses can cause back pain, it is usually not caused by a serious illness. Pain is usually related to physical activity, whether sports, exercise, work, or normal activity. Sometimes it can occur without an identifiable cause. This can happen simply by stretching or moving wrong, without noting pain at the time. Other causes include:  · Overexertion, lifting, pushing, pulling incorrectly or too aggressively.  · Sudden twisting, bending or stretching from an accident (car or fall), or accidental movement.  · Poor posture  · Poor conditioning, lack of regular exercise  · Spinal disc disease or arthritis  · Stress  · Pregnancy, or illness like appendicitis, bladder or kidney infection, pelvic infections   Home care  · For neck pain: Use a comfortable pillow that supports the head and keeps the spine in a  neutral position. The position of the head should not be tilted forward or backward.  · When in bed, try to find a position of comfort. A firm mattress is best. Try lying flat on your back with pillows under your knees. You can also try lying on your side with your knees bent up towards your chest and a pillow between your knees.  · At first, do not try to stretch out the sore spots. If there is a strain, it is not like the good soreness you get after exercising without an injury. In this case, stretching may make it worse.  · Avoid prolonged sitting, long car rides or travel. This puts more stress on the lower back than standing or walking.  · During the first 24 to 72 hours after an injury, apply an ice pack to the painful area for 20 minutes and then remove it for 20 minutes over a period of 60 to 90 minutes or several times a day.   · You can alternate ice and heat therapies. Talk with your healthcare provider about the best treatment for your back or neck pain. As a safety precaution, do not use a heating pad at bedtime. Sleeping with a heating pad can lead to skin burns or tissue damage.  · Therapeutic massage can help relax the back and neck muscles without stretching them.  · Be aware of safe lifting methods and do not lift anything over 15 pounds until all the pain is gone.  Medications  Talk to your healthcare provider before using medicine, especially if you have other medical problems or are taking other medicines.  · You may use over-the-counter medicine to control pain, unless another pain medicine was prescribed. If you have chronic conditions like diabetes, liver or kidney disease, stomach ulcers,  gastrointestinal bleeding, or are taking blood thinner medicines.  · Be careful if you are given pain medicines, narcotics, or medicine for muscle spasm. They can cause drowsiness, and can affect your coordination, reflexes, and judgment. Do not drive or operate heavy machinery.  Follow-up care  Follow up  with your healthcare provider, or as advised. Physical therapy or further tests may be needed.  If X-rays were taken, you will be notified of any new findings that may affect your care.  Call 911  Seek emergency medical care if any of the following occur:  · Trouble breathing  · Confusion  · Very drowsy or trouble awakening  · Fainting or loss of consciousness  · Rapid or very slow heart rate  · Loss of bowel or bladder control  When to seek medical advice  Call your healthcare provider right away if any of these occur:  · Pain becomes worse or spreads into your arms or legs  · Weakness, numbness or pain in one or both arms or legs  · Numbness in the groin area  · Difficulty walking  · Fever of 100.4ºF (38ºC) or higher, or as directed by your healthcare provider  Date Last Reviewed: 7/1/2016 © 2000-2016 [email or web address]. [address] [place] [id number]. All rights reserved. This information is not intended as a substitute for professional medical care. Always follow your healthcare professional's instructions.      Back Pain (Acute or Chronic)    Back pain is one of the most common problems. The good news is that most people feel better in 1 to 2 weeks, and most of the rest in 1 to 2 months. Most people can remain active.  People experience and describe pain differently; not everyone is the same.  · The pain can be sharp, stabbing, shooting, aching, cramping or burning.  · Movement, standing, bending, lifting, sitting, or walking may worsen pain.  · It can be localized to one spot or area, or it can be more generalized.  · It can spread or radiate upwards, to the front, or go down your arms or legs (sciatica).  · It can cause muscle spasm.  Most of the time, mechanical problems with the muscles or spine cause the pain. Mechanical problems are usually caused by an injury to the muscles or ligaments. While illness can cause back pain, it is usually not caused by a serious illness. Mechanical  problems include:   · Physical activity such as sports, exercise, work, or normal activity  · Overexertion, lifting, pushing, pulling incorrectly or too aggressively  · Sudden twisting, bending, or stretching from an accident, or accidental movement  · Poor posture  · Stretching or moving wrong, without noticing pain at the time  · Poor coordination, lack of regular exercise (check with your doctor about this)  · Spinal disc disease or arthritis  · Stress  Pain can also be related to pregnancy, or illness like appendicitis, bladder or kidney infections, pelvic infections, and many other things.  Acute back pain usually gets better in 1 to 2 weeks. Back pain related to disk disease, arthritis in the spinal joints or spinal stenosis (narrowing of the spinal canal) can become chronic and last for months or years.  Unless you had a physical injury (for example, a car accident or fall) X-rays are usually not needed for the initial evaluation of back pain. If pain continues and does not respond to medical treatment, X-rays and other tests may be needed.  Home care  Try these home care recommendations:  · When in bed, try to find a position of comfort. A firm mattress is best. Try lying flat on your back with pillows under your knees. You can also try lying on your side with your knees bent up towards your chest and a pillow between your knees.  · At first, do not try to stretch out the sore spots. If there is a strain, it is not like the good soreness you get after exercising without an injury. In this case, stretching may make it worse.  · Avoid prolong sitting, long car rides, or travel. This puts more stress on the lower back than standing or walking.  · During the first 24 to 72 hours after an acute injury or flare up of chronic back pain, apply an ice pack to the painful area for 20 minutes and then remove it for 20 minutes. Do this over a period of 60 to 90 minutes or several times a day. This will reduce swelling  and pain. Wrap the ice pack in a thin towel or plastic to protect your skin.  · You can start with ice, then switch to heat. Heat (hot shower, hot bath, or heating pad) reduces pain and works well for muscle spasms. Heat can be applied to the painful area for 20 minutes then remove it for 20 minutes. Do this over a period of 60 to 90 minutes or several times a day. Do not sleep on a heating pad. It can lead to skin burns or tissue damage.  · You can alternate ice and heat therapy. Talk with your doctor about the best treatment for your back pain.  · Therapeutic massage can help relax the back muscles without stretching them.  · Be aware of safe lifting methods and do not lift anything without stretching first.  Medicines  Talk to your doctor before using medicine, especially if you have other medical problems or are taking other medicines.  · You may use over-the-counter medicine as directed on the bottle to control pain, unless another pain medicine was prescribed. If you have chronic conditions like diabetes, liver or kidney disease, stomach ulcers, or gastrointestinal bleeding, or are taking blood thinners, talk to your doctor before taking any medicine.  · Be careful if you are given a prescription medicines, narcotics, or medicine for muscle spasms. They can cause drowsiness, affect your coordination, reflexes, and judgement. Do not drive or operate heavy machinery.  Follow-up care  Follow up with your healthcare provider, or as advised.   A radiologist will review any X-rays that were taken. Your provide will notify you of any new findings that may affect your care.  Call 911  Call emergency services if any of the following occur:  · Trouble breathing  · Confusion  · Very drowsy or trouble awakening  · Fainting or loss of consciousness  · Rapid or very slow heart rate  · Loss of bowel or bladder control  When to seek medical advice  Call your healthcare provider right away if any of these occur:   · Pain  becomes worse or spreads to your legs  · Weakness or numbness in one or both legs  · Numbness in the groin or genital area  Date Last Reviewed: 7/1/2016 © 2000-2016 [employer]. [address] [place] [address], [place] [address]. All rights reserved. This information is not intended as a substitute for professional medical care. Always follow your healthcare professional's instructions.      Back Care Tips    Caring for your back  These are things you can do to prevent a recurrence of acute back pain and to reduce symptoms from chronic back pain:  · Maintain a healthy weight. If you are overweight, losing weight will help most types of back pain.  · Exercise is an important part of recovery from most types of back pain. The muscles behind and in front of the spine support the back. This means strengthening both the back muscles and the abdominal muscles will provide better support for your spine.   · Swimming and brisk walking are good overall exercises to improve your fitness level.  · Practice safe lifting methods (below).  · Practice good posture when sitting, standing and walking. Avoid prolonged sitting. This puts more stress on the lower back than standing or walking.  · Wear quality shoes with sufficient arch support. Foot and ankle alignment can affect back symptoms. Women should avoid wearing high heels.  · Therapeutic massage can help relax the back muscles without stretching them.  · During the first 24 to 72 hours after an acute injury or flare-up of chronic back pain, apply an ice pack to the painful area for 20 minutes and then remove it for 20 minutes, over a period of 60 to 90 minutes, or several times a day. As a safety precaution, do not use a heating pad at bedtime. Sleeping on a heating pad can lead to skin burns or tissue damage.  · You can alternate ice and heat therapies.  Medications  Talk to your healthcare provider before using medicines, especially if you have other medical problems  or are taking other medicines.  · You may use acetaminophen or ibuprofen to control pain, unless your healthcare provider prescribed other pain medicine. If you have chronic conditions like diabetes, liver or kidney disease, stomach ulcers, or gastrointestinal bleeding, or are taking blood thinners, talk with your healthcare provider before taking any medicines.  · Be careful if you are given prescription pain medicines, narcotics, or medicine for muscle spasm. They can cause drowsiness, affect your coordination, reflexes, and judgment. Do not drive or operate heavy machinery while taking these types of medicines. Take prescription pain medicine only as prescribed by your healthcare provider.  Lumbar stretch  Here is a simple stretching exercise that will help relax muscle spasm and keep your back more limber. If exercise makes your back pain worse, dont do it.  · Lie on your back with your knees bent and both feet on the ground.  · Slowly raise your left knee to your chest as you flatten your lower back against the floor. Hold for 5 seconds.  · Relax and repeat the exercise with your right knee.  · Do 10 of these exercises for each leg.  Safe lifting method  · Dont bend over at the waist to lift an object off the floor.  Instead, bend your knees and hips in a squat.   · Keep your back and head upright  · Hold the object close to your body, directly in front of you.  · Straighten your legs to lift the object.   · Lower the object to the floor in the reverse fashion.  · If you must slide something across the floor, push it.  Posture tips  Sitting  Sit in chairs with straight backs or low-back support. Keep your knees lower than your hips, with your feet flat on the floor.  When driving, sit up straight. Adjust the seat forward so you are not leaning toward the steering wheel.  A small pillow or rolled towel behind your lower back may help if you are driving long distances.   Standing  When standing for long  periods, shift most of your weight to one leg at a time. Alternate legs every few minutes.   Sleeping  The best way to sleep is on your side with your knees bent. Put a low pillow under your head to support your neck in a neutral spine position. Avoid thick pillows that bend your neck to one side. Put a pillow between your legs to further relax your lower back. If you sleep on your back, put pillows under your knees to support your legs in a slightly flexed position. Use a firm mattress. If your mattress sags, replace it, or use a 1/2-inch plywood board under the mattress to add support.  Follow-up care  Follow up with your healthcare provider, or as advised.  If X-rays, a CT scan or an MRI scan were taken, they will be reviewed by a radiologist. You will be notified of any new findings that may affect your care.  Call 911  Seek emergency medical care if any of the following occur:  · Trouble breathing  · Confusion  · Very drowsy  · Fainting or loss of consciousness  · Rapid or very slow heart rate  · Loss of  bowel or bladder control  When to seek medical care  Call your healthcare provider if any of the following occur:  · Pain becomes worse or spreads to your arms or legs  · Weakness or numbness in one or both arms or legs  · Numbness in the groin area  Date Last Reviewed: 6/1/2016  © 8771-8595 [employer]. [address] [place] [address], [place] [address]. All rights reserved. This information is not intended as a substitute for professional medical care. Always follow your healthcare professional's instructions.

## 2022-08-26 DIAGNOSIS — M47.816 LUMBAR SPONDYLOSIS: ICD-10-CM

## 2022-08-26 DIAGNOSIS — Z98.1 S/P LUMBAR SPINAL FUSION: Primary | ICD-10-CM

## 2022-08-27 ENCOUNTER — PATIENT MESSAGE (OUTPATIENT)
Dept: INTERNAL MEDICINE | Facility: CLINIC | Age: 47
End: 2022-08-27
Payer: MEDICAID

## 2022-08-27 ENCOUNTER — PATIENT MESSAGE (OUTPATIENT)
Dept: NEUROSURGERY | Facility: CLINIC | Age: 47
End: 2022-08-27
Payer: MEDICAID

## 2022-08-29 ENCOUNTER — PATIENT MESSAGE (OUTPATIENT)
Dept: NEUROSURGERY | Facility: CLINIC | Age: 47
End: 2022-08-29
Payer: MEDICAID

## 2022-08-29 DIAGNOSIS — Z98.1 S/P LUMBAR SPINAL FUSION: Primary | ICD-10-CM

## 2022-08-29 RX ORDER — HYDROCODONE BITARTRATE AND ACETAMINOPHEN 5; 325 MG/1; MG/1
1 TABLET ORAL EVERY 8 HOURS PRN
Qty: 56 TABLET | Refills: 0 | Status: CANCELLED | OUTPATIENT
Start: 2022-08-29

## 2022-08-29 RX ORDER — DIAZEPAM 5 MG/1
5 TABLET ORAL EVERY 6 HOURS PRN
Qty: 60 TABLET | Refills: 0 | Status: CANCELLED | OUTPATIENT
Start: 2022-08-29 | End: 2022-09-28

## 2022-08-29 RX ORDER — METHOCARBAMOL 500 MG/1
500 TABLET, FILM COATED ORAL 4 TIMES DAILY PRN
Qty: 40 TABLET | Refills: 0 | Status: SHIPPED | OUTPATIENT
Start: 2022-08-29 | End: 2022-09-08

## 2022-08-30 ENCOUNTER — PATIENT MESSAGE (OUTPATIENT)
Dept: NEUROSURGERY | Facility: CLINIC | Age: 47
End: 2022-08-30
Payer: MEDICAID

## 2022-08-31 RX ORDER — HYDROCODONE BITARTRATE AND ACETAMINOPHEN 5; 325 MG/1; MG/1
1 TABLET ORAL EVERY 12 HOURS PRN
Qty: 28 TABLET | Refills: 0 | Status: SHIPPED | OUTPATIENT
Start: 2022-08-31 | End: 2022-09-14

## 2022-09-13 ENCOUNTER — OFFICE VISIT (OUTPATIENT)
Dept: NEUROSURGERY | Facility: CLINIC | Age: 47
End: 2022-09-13
Payer: MEDICAID

## 2022-09-13 VITALS
SYSTOLIC BLOOD PRESSURE: 131 MMHG | HEIGHT: 70 IN | HEART RATE: 77 BPM | DIASTOLIC BLOOD PRESSURE: 95 MMHG | BODY MASS INDEX: 26.76 KG/M2 | TEMPERATURE: 98 F | WEIGHT: 186.94 LBS

## 2022-09-13 DIAGNOSIS — Z98.1 S/P LUMBAR SPINAL FUSION: Primary | ICD-10-CM

## 2022-09-13 PROCEDURE — 3008F PR BODY MASS INDEX (BMI) DOCUMENTED: ICD-10-PCS | Mod: CPTII,,, | Performed by: PHYSICIAN ASSISTANT

## 2022-09-13 PROCEDURE — 1159F MED LIST DOCD IN RCRD: CPT | Mod: CPTII,,, | Performed by: PHYSICIAN ASSISTANT

## 2022-09-13 PROCEDURE — 3080F DIAST BP >= 90 MM HG: CPT | Mod: CPTII,,, | Performed by: PHYSICIAN ASSISTANT

## 2022-09-13 PROCEDURE — 1160F RVW MEDS BY RX/DR IN RCRD: CPT | Mod: CPTII,,, | Performed by: PHYSICIAN ASSISTANT

## 2022-09-13 PROCEDURE — 99213 OFFICE O/P EST LOW 20 MIN: CPT | Mod: PBBFAC | Performed by: PHYSICIAN ASSISTANT

## 2022-09-13 PROCEDURE — 1160F PR REVIEW ALL MEDS BY PRESCRIBER/CLIN PHARMACIST DOCUMENTED: ICD-10-PCS | Mod: CPTII,,, | Performed by: PHYSICIAN ASSISTANT

## 2022-09-13 PROCEDURE — 99999 PR PBB SHADOW E&M-EST. PATIENT-LVL III: CPT | Mod: PBBFAC,,, | Performed by: PHYSICIAN ASSISTANT

## 2022-09-13 PROCEDURE — 99213 PR OFFICE/OUTPT VISIT, EST, LEVL III, 20-29 MIN: ICD-10-PCS | Mod: S$PBB,,, | Performed by: PHYSICIAN ASSISTANT

## 2022-09-13 PROCEDURE — 3075F PR MOST RECENT SYSTOLIC BLOOD PRESS GE 130-139MM HG: ICD-10-PCS | Mod: CPTII,,, | Performed by: PHYSICIAN ASSISTANT

## 2022-09-13 PROCEDURE — 3008F BODY MASS INDEX DOCD: CPT | Mod: CPTII,,, | Performed by: PHYSICIAN ASSISTANT

## 2022-09-13 PROCEDURE — 1159F PR MEDICATION LIST DOCUMENTED IN MEDICAL RECORD: ICD-10-PCS | Mod: CPTII,,, | Performed by: PHYSICIAN ASSISTANT

## 2022-09-13 PROCEDURE — 99999 PR PBB SHADOW E&M-EST. PATIENT-LVL III: ICD-10-PCS | Mod: PBBFAC,,, | Performed by: PHYSICIAN ASSISTANT

## 2022-09-13 PROCEDURE — 3080F PR MOST RECENT DIASTOLIC BLOOD PRESSURE >= 90 MM HG: ICD-10-PCS | Mod: CPTII,,, | Performed by: PHYSICIAN ASSISTANT

## 2022-09-13 PROCEDURE — 3075F SYST BP GE 130 - 139MM HG: CPT | Mod: CPTII,,, | Performed by: PHYSICIAN ASSISTANT

## 2022-09-13 PROCEDURE — 99213 OFFICE O/P EST LOW 20 MIN: CPT | Mod: S$PBB,,, | Performed by: PHYSICIAN ASSISTANT

## 2022-09-13 NOTE — PROGRESS NOTES
Neurosurgery  Established Patient    SUBJECTIVE:     Interval History:  Mr. Garay is here today s/p SI joint and GTB injection.  Patient reports minimal relief s/p injections.  He reports ongoing low back and right hip pain as well as RLE pain and paraesthesias that travel anteriorly down the leg to his knee.  Patient states that leg symptoms have been ongoing, and does not feel as though has improved since surgery.  Denies symptoms in LLE.  Denies bowel/bladder dysfunction.     History of Present Illness (7/12/22, Catalina Wolff PA-C):  47 year old male s/p L4-5, L5-S1 TLIF on 6/2/22 who presents today for 6 week psot-op follow up. He reports his back pain is improving but he continues with right hip pain that radiates into his right buttocks and right anterior thigh. It does not radiate past the knee. He reports the pain is worsened with sitting on the right buttocks. He did have a fall out of the bed onto his left hip after discharge which he attributes to right leg numbness. He denies left leg pain. He admits to little activity since discharge and states he sits most of the day. He has not been getting home health therapy due to insurance issues and refused transfer to rehab post-op. Denies new weakness or Bowel or bladder issues. CT lumbar spine obtained post-op during his admission showed small amount of bone matrix and mild narrowing R L5 ventrolateral aspect of central canal.     Review of patient's allergies indicates:  No Known Allergies    Current Outpatient Medications   Medication Sig Dispense Refill    DULoxetine (CYMBALTA) 30 MG capsule TAKE 1 CAPSULE(30 MG) BY MOUTH EVERY DAY 30 capsule 1    HYDROcodone-acetaminophen (NORCO) 5-325 mg per tablet Take 1 tablet by mouth every 12 (twelve) hours as needed for Pain. 28 tablet 0    acetaminophen (TYLENOL) 500 MG tablet Take 1 tablet (500 mg total) by mouth 2 (two) times a day. (Patient not taking: No sig reported) 60 tablet 0    ondansetron (ZOFRAN-ODT) 4  MG TbDL Dissolve 2 tablets (8 mg total) by mouth every 8 (eight) hours as needed (nausea). (Patient not taking: No sig reported) 30 tablet 0     No current facility-administered medications for this visit.       Past Medical History:   Diagnosis Date    Anxiety     Depression     related to current illness    Seizures     Had seizures as a child     Past Surgical History:   Procedure Laterality Date    ABDOMINAL SURGERY      APPENDECTOMY      BACK SURGERY      COLON SURGERY      INJECTION, SACROILIAC JOINT Right 7/28/2022    Procedure: INJECTION,SACROILIAC JOINT RIGHT SI & RIGHT GTB;  Surgeon: Ramya Lundy MD;  Location: [facility] PAIN [facility]GT;  Service: Pain Management;  Laterality: Right;    MINIMALLY INVASIVE TRANSFORAMINAL LUMBAR INTERBODY FUSION (TLIF) N/A 6/2/2022    Procedure: FUSION, SPINE, LUMBAR, TLIF, MINIMALLY INVASIVE L4-5, L5-S1;  Surgeon: Humberto Rhodes MD;  Location: [facility] OR [address] [place] [address];  Service: Neurosurgery;  Laterality: N/A;  [facility] 4 poster, prone, neuromonitoring, globus robot, Heaven Giron     TRANSFORAMINAL EPIDURAL INJECTION OF STEROID Right 07/12/2021    Procedure: LUMBAR TRANSFORAMINAL RIGHT L4/5 DIRECT REFERRAL NEEDS CONSENT;  Surgeon: Ramya Lundy MD;  Location: [facility] PAIN [facility]GT;  Service: Pain Management;  Laterality: Right;     Family History       Problem Relation (Age of Onset)    Cancer Mother    Heart attack Mother    Suicide Father          Social History     Socioeconomic History    Marital status:     Number of children: 7   Tobacco Use    Smoking status: Former     Packs/day: 0.50     Years: 10.00     Pack years: 5.00     Types: Cigarettes    Smokeless tobacco: Never    Tobacco comments:     Stopped 2 weeks ago   Substance and Sexual Activity    Alcohol use: No     Comment: socially- wedding or party    Drug use: No    Sexual activity: Not Currently     Partners: Female   Social History Narrative    Stairs- none       Review of Systems  Constitutional: no fever or chills  Eyes: no  "visual changes  ENT: no nasal congestion or sore throat  Respiratory: no cough or shortness of breath  Cardiovascular: no chest pain or palpitations  Gastrointestinal: no nausea or vomiting  Genitourinary: no hematuria or dysuria  Integument/Breast: no rash or pruritis  Hematologic/Lymphatic: no easy bruising or lymphadenopathy  Musculoskeletal: no arthralgias or myalgias  Neurological: no seizures or tremors    OBJECTIVE:     Vital Signs  Temp: 97.8 °F (36.6 °C)  Pulse: 77  BP: (!) 131/95  Pain Score:   9  Height: 5' 10" (177.8 cm)  Weight: 84.8 kg (186 lb 15.2 oz)  Body mass index is 26.82 kg/m².    Neurosurgery Physical Exam  General: well developed, well nourished, no distress.   Head: normocephalic, atraumatic  Neurologic: Alert and oriented. Thought content appropriate.  Cranial nerves: face symmetric, tongue midline, CN II-XII grossly intact.   Eyes: pupils equal, round, reactive to light with accomodation, EOMI.    Sensory: intact to light touch throughout  Motor Strength:Pain limited RLE exam   Strength   Deltoids Triceps Biceps Wrist Extension Wrist Flexion Hand    Upper: R 5/5 5/5 5/5 5/5 5/5 5/5     L 5/5 5/5 5/5 5/5 5/5 5/5       Iliopsoas Quadriceps Knee  Flexion Tibialis  anterior Gastro- cnemius EHL   Lower: R 4-/5 4/5 4/5 4+/5 4/5 5/5     L 5/5 5/5 5/5 5/5 5/5 5/5   SI joint TTP on the right  GTB tenderness on the right  Back: surgical incision well healed        Diagnostic Results:   No recent    ASSESSMENT/PLAN:     47 year old male 3 months s/p L4-5, L5-S1 TLIF (on 6/2/22).  Patient with minimal relief s/p SI joint and GTB injection.  He has ongoing RLE pain and paraesthesias, and pain limited weakness.  Distribution of symptoms correlate more to the lumbar levels above the surgical construct.  Will get updated CT of the Lumbar spine.  If stable and leg symptoms ongoing will consider emg/ncs.  He was encouraged to call the clinic with any questions or concerns in the meantime.       Tre" JESSY Belcher Jr. PA-C  [facility]  Department of Neurosurgery      Note dictated with voice recognition software, please excuse any grammatical errors.

## 2022-09-27 ENCOUNTER — OFFICE VISIT (OUTPATIENT)
Dept: NEUROSURGERY | Facility: CLINIC | Age: 47
End: 2022-09-27
Payer: MEDICAID

## 2022-09-27 ENCOUNTER — HOSPITAL ENCOUNTER (OUTPATIENT)
Dept: RADIOLOGY | Facility: HOSPITAL | Age: 47
Discharge: HOME | End: 2022-09-27
Attending: PHYSICIAN ASSISTANT
Payer: MEDICAID

## 2022-09-27 VITALS — HEIGHT: 70 IN | WEIGHT: 190 LBS | BODY MASS INDEX: 27.2 KG/M2

## 2022-09-27 DIAGNOSIS — M51.36 DDD (DEGENERATIVE DISC DISEASE), LUMBAR: ICD-10-CM

## 2022-09-27 DIAGNOSIS — M25.551 HIP PAIN, ACUTE, RIGHT: Primary | ICD-10-CM

## 2022-09-27 DIAGNOSIS — Z98.1 S/P LUMBAR SPINAL FUSION: ICD-10-CM

## 2022-09-27 PROCEDURE — 99999 PR PBB SHADOW E&M-EST. PATIENT-LVL III: CPT | Mod: PBBFAC,,, | Performed by: PHYSICIAN ASSISTANT

## 2022-09-27 PROCEDURE — 1159F MED LIST DOCD IN RCRD: CPT | Mod: CPTII,,, | Performed by: PHYSICIAN ASSISTANT

## 2022-09-27 PROCEDURE — 99999 PR PBB SHADOW E&M-EST. PATIENT-LVL III: ICD-10-PCS | Mod: PBBFAC,,, | Performed by: PHYSICIAN ASSISTANT

## 2022-09-27 PROCEDURE — 3008F PR BODY MASS INDEX (BMI) DOCUMENTED: ICD-10-PCS | Mod: CPTII,,, | Performed by: PHYSICIAN ASSISTANT

## 2022-09-27 PROCEDURE — 72131 CT LUMBAR SPINE W/O DYE: CPT | Mod: 26,,, | Performed by: RADIOLOGY

## 2022-09-27 PROCEDURE — 72131 CT LUMBAR SPINE W/O DYE: CPT | Mod: TC

## 2022-09-27 PROCEDURE — 99214 PR OFFICE/OUTPT VISIT, EST, LEVL IV, 30-39 MIN: ICD-10-PCS | Mod: S$PBB,,, | Performed by: PHYSICIAN ASSISTANT

## 2022-09-27 PROCEDURE — 72131 CT LUMBAR SPINE WITHOUT CONTRAST: ICD-10-PCS | Mod: 26,,, | Performed by: RADIOLOGY

## 2022-09-27 PROCEDURE — 99214 OFFICE O/P EST MOD 30 MIN: CPT | Mod: S$PBB,,, | Performed by: PHYSICIAN ASSISTANT

## 2022-09-27 PROCEDURE — 3008F BODY MASS INDEX DOCD: CPT | Mod: CPTII,,, | Performed by: PHYSICIAN ASSISTANT

## 2022-09-27 PROCEDURE — 99213 OFFICE O/P EST LOW 20 MIN: CPT | Mod: PBBFAC,25 | Performed by: PHYSICIAN ASSISTANT

## 2022-09-27 PROCEDURE — 1159F PR MEDICATION LIST DOCUMENTED IN MEDICAL RECORD: ICD-10-PCS | Mod: CPTII,,, | Performed by: PHYSICIAN ASSISTANT

## 2022-09-30 ENCOUNTER — PATIENT MESSAGE (OUTPATIENT)
Dept: NEUROSURGERY | Facility: CLINIC | Age: 47
End: 2022-09-30
Payer: MEDICAID

## 2022-10-04 ENCOUNTER — PATIENT MESSAGE (OUTPATIENT)
Dept: ADMINISTRATIVE | Facility: OTHER | Age: 47
End: 2022-10-04
Payer: MEDICAID

## 2022-10-05 NOTE — PROGRESS NOTES
Neurosurgery  Established Patient    SUBJECTIVE:     Interval History:  7 year old male s/p L4-5, L5-S1 TLIF on 6/2/22 who presents for follow up. He has had a complex post-operative course with continued right hip and buttocks pain. He was last evaluated in clinic by Tre Belcher PA-C 9/13/22 who ordered an updated CT lumbar spine to evaluate fusion 3 months post-op as well as to further evaluate his pain. He previously had SI joint and GTB injections without much relief. Today, he reports his back pain is improving but continues with right hip pain, right buttocks pain as well as RLE pain and paraesthesias that travel anteriorly down the leg to his knee.  Patient states that leg symptoms have been ongoing since surgery. Denies symptoms in LLE.  Denies new weakness, bowel/bladder dysfunction. Xrays of his right hip were obtained in the hospital post-op which were unrevealing.     History of Present Illness (7/12/22):  47 year old male s/p L4-5, L5-S1 TLIF on 6/2/22 who presents today for 6 week psot-op follow up. He reports his back pain is improving but he continues with right hip pain that radiates into his right buttocks and right anterior thigh. It does not radiate past the knee. He reports the pain is worsened with sitting on the right buttocks. He did have a fall out of the bed onto his left hip after discharge which he attributes to right leg numbness. He denies left leg pain. He admits to little activity since discharge and states he sits most of the day. He has not been getting home health therapy due to insurance issues and refused transfer to rehab post-op. Denies new weakness or Bowel or bladder issues. CT lumbar spine obtained post-op during his admission showed small amount of bone matrix and mild narrowing R L5 ventrolateral aspect of central canal.     Review of patient's allergies indicates:  No Known Allergies    Current Outpatient Medications   Medication Sig Dispense Refill    acetaminophen  (TYLENOL) 500 MG tablet Take 1 tablet (500 mg total) by mouth 2 (two) times a day. 60 tablet 0    DULoxetine (CYMBALTA) 30 MG capsule TAKE 1 CAPSULE(30 MG) BY MOUTH EVERY DAY 30 capsule 1    ondansetron (ZOFRAN-ODT) 4 MG TbDL Dissolve 2 tablets (8 mg total) by mouth every 8 (eight) hours as needed (nausea). (Patient not taking: No sig reported) 30 tablet 0     No current facility-administered medications for this visit.       Past Medical History:   Diagnosis Date    Anxiety     Depression     related to current illness    Seizures     Had seizures as a child     Past Surgical History:   Procedure Laterality Date    ABDOMINAL SURGERY      APPENDECTOMY      BACK SURGERY      COLON SURGERY      INJECTION, SACROILIAC JOINT Right 7/28/2022    Procedure: INJECTION,SACROILIAC JOINT RIGHT SI & RIGHT GTB;  Surgeon: Ramya Lundy MD;  Location: [facility]IN [facility];  Service: Pain Management;  Laterality: Right;    MINIMALLY INVASIVE TRANSFORAMINAL LUMBAR INTERBODY FUSION (TLIF) N/A 6/2/2022    Procedure: FUSION, SPINE, LUMBAR, TLIF, MINIMALLY INVASIVE L4-5, L5-S1;  Surgeon: Humberto Rhodes MD;  Location: [facility] OR [address] [place] [address];  Service: Neurosurgery;  Laterality: N/A;  [place] Table 4 poster, prone, neuromonitoring, globus robot, Heaven Giron     TRANSFORAMINAL EPIDURAL INJECTION OF STEROID Right 07/12/2021    Procedure: LUMBAR TRANSFORAMINAL RIGHT L4/5 DIRECT REFERRAL NEEDS CONSENT;  Surgeon: Ramya Lundy MD;  Location: [facility] PAIN [facility];  Service: Pain Management;  Laterality: Right;     Family History       Problem Relation (Age of Onset)    Cancer Mother    Heart attack Mother    Suicide Father          Social History     Socioeconomic History    Marital status:     Number of children: 7   Tobacco Use    Smoking status: Former     Packs/day: 0.50     Years: 10.00     Pack years: 5.00     Types: Cigarettes    Smokeless tobacco: Never    Tobacco comments:     Stopped 2 weeks ago   Substance and Sexual Activity    Alcohol use: No      "Comment: socially- wedding or party    Drug use: No    Sexual activity: Not Currently     Partners: Female   Social History Narrative    Stairs- none       Review of Systems  Constitutional: no fever or chills  Eyes: no visual changes  ENT: no nasal congestion or sore throat  Respiratory: no cough or shortness of breath  Cardiovascular: no chest pain or palpitations  Gastrointestinal: no nausea or vomiting  Genitourinary: no hematuria or dysuria  Integument/Breast: no rash or pruritis  Hematologic/Lymphatic: no easy bruising or lymphadenopathy  Musculoskeletal: + hip pain, + back pain   Neurological: no seizures or tremors, + paresthesias     OBJECTIVE:     Vital Signs  Pain Score:   9  Height: 5' 10" (177.8 cm)  Weight: 86.2 kg (190 lb)  Body mass index is 27.26 kg/m².    Neurosurgery Physical Exam  General: well developed, well nourished, no distress.   Head: normocephalic, atraumatic  Neurologic: Alert and oriented. Thought content appropriate.  Cranial nerves: face symmetric, tongue midline, CN II-XII grossly intact.   Eyes: pupils equal, round, reactive to light with accomodation, EOMI.    Sensory: intact to light touch throughout  Motor Strength: Pain limited RLE exam   Strength   Deltoids Triceps Biceps Wrist Extension Wrist Flexion Hand    Upper: R 5/5 5/5 5/5 5/5 5/5 5/5     L 5/5 5/5 5/5 5/5 5/5 5/5       Iliopsoas Quadriceps Knee  Flexion Tibialis  anterior Gastro- cnemius EHL   Lower: R 4-/5 4/5 4/5 4+/5 4/5 5/5     L 5/5 5/5 5/5 5/5 5/5 5/5   SI joint TTP on the right  GTB tenderness on the right  Unable to externally or internally rotate right hip due to pain   Back: surgical incision well healed      Diagnostic Results:   CT lumbar spine 9/27/22 reviewed and shows good fusion at L4-5 and L5-S1. There is no hardware complication. No significant stenosis visualized     ASSESSMENT/PLAN:     47 year old male 3 months s/p L4-5, L5-S1 TLIF (on 6/2/22) whos post-operative course has been complicated by " persistent right hip, buttocks and anterior thigh pain. Patient with minimal relief s/p SI joint and GTB injection. CT lumbar spine is without hardware complication and no significant stenosis at levels above to explain this pain distribution. At this point, given his exam findings, I would like to refer him to ortho for a full hip evaluation. I will also refer him to the pain clinic for his chronic pain. I would like him to continue his therapy and I will see him back in 3 months or sooner with new or worsening symptoms.     All symptoms and signs that require emergent or urgent treatment were reviewed. The plan was discussed with the patient. All of the the patient's questions and concerns were answered and he voiced understanding. Please feel free to call with any further questions.       Catalina Wolff PA-C  Neurosurgery    Note dictated with voice recognition software, please excuse any grammatical errors.

## 2022-10-12 ENCOUNTER — PATIENT MESSAGE (OUTPATIENT)
Dept: SPORTS MEDICINE | Facility: CLINIC | Age: 47
End: 2022-10-12
Payer: MEDICAID

## 2022-10-13 ENCOUNTER — TELEPHONE (OUTPATIENT)
Dept: ORTHOPEDICS | Facility: CLINIC | Age: 47
End: 2022-10-13
Payer: MEDICAID

## 2022-10-13 ENCOUNTER — TELEPHONE (OUTPATIENT)
Dept: SPORTS MEDICINE | Facility: CLINIC | Age: 47
End: 2022-10-13
Payer: MEDICAID

## 2022-10-13 ENCOUNTER — PATIENT MESSAGE (OUTPATIENT)
Dept: SPORTS MEDICINE | Facility: CLINIC | Age: 47
End: 2022-10-13
Payer: MEDICAID

## 2022-10-13 NOTE — TELEPHONE ENCOUNTER
----- Message from Delvis Ram sent at 10/13/2022  1:10 PM CDT -----  Regarding: PT'S RETUNRING A CALL FROM LULY  Contact: PT  Pt's requesting a call back regarding scheduling..     Confirmed contact info below:  Contact Name: Junior Garay  Phone Number: [phone]    We spoke  he has been playing phone tag with someone named Luly   she left a phone number for him  he did  not put 504  first  then the number he will try that , Ill call him back to see if it worked

## 2022-10-13 NOTE — TELEPHONE ENCOUNTER
Attempted to return phone call, unable to contact patient. Busy tone.    Mimi Snowden MS, OTC  Clinical Assistant to Dr. Martha Royal      ----- Message from Glenis Givens sent at 10/13/2022  2:10 PM CDT -----  Pt returning call to office       Confirmed patient's contact info below:  Contact Name: Junior Garay  Phone Number: [phone]

## 2022-10-14 ENCOUNTER — TELEPHONE (OUTPATIENT)
Dept: SPORTS MEDICINE | Facility: CLINIC | Age: 47
End: 2022-10-14
Payer: MEDICAID

## 2022-10-14 NOTE — TELEPHONE ENCOUNTER
Called and spoke to patient.  He is scheduled with Dr. Royal on 11/1/22 at the [facility].  Patient was in agreement with this appointment.

## 2022-10-14 NOTE — TELEPHONE ENCOUNTER
----- Message from Glenis Givens sent at 10/14/2022  3:17 PM CDT -----  Pt returning call to office to schedule appt , pt availability is ,anytime on a Tuesday except the 25th             Confirmed patient's contact info below:  Contact Name: Junior Garay  Phone Number: [phone]

## 2022-10-17 ENCOUNTER — PATIENT MESSAGE (OUTPATIENT)
Dept: SPORTS MEDICINE | Facility: CLINIC | Age: 47
End: 2022-10-17
Payer: MEDICAID

## 2022-10-18 DIAGNOSIS — Z74.09 IMPAIRED FUNCTIONAL MOBILITY, BALANCE, GAIT, AND ENDURANCE: Primary | ICD-10-CM

## 2022-10-18 DIAGNOSIS — M51.36 DDD (DEGENERATIVE DISC DISEASE), LUMBAR: ICD-10-CM

## 2022-10-18 DIAGNOSIS — Z98.1 S/P LUMBAR SPINAL FUSION: ICD-10-CM

## 2022-10-21 ENCOUNTER — TELEPHONE (OUTPATIENT)
Dept: NEUROSURGERY | Facility: CLINIC | Age: 47
End: 2022-10-21
Payer: MEDICAID

## 2022-10-21 NOTE — TELEPHONE ENCOUNTER
Email sent to workers comp to submit for authorization for PT order.     ----- Message from Jo Root PA-C sent at 10/21/2022 11:26 AM CDT -----  Taylor Michael,    This is one of Dr. Rhodes's pts. I put in new PT order for him. Can you take care of new 10/10 form?    ThanksAva  ----- Message -----  From: Ronak Glass, PT  Sent: 10/17/2022  10:49 AM CDT  To: Jo Root PA-C    Hi,    Thank you for cosigning this note last week.  For worker's comp, can you also submit a new order for therapy and a new 10/10 form in order for preservice to request more visits for his therapy.    Thanks    Ronak Glass, PT

## 2022-11-01 ENCOUNTER — OFFICE VISIT (OUTPATIENT)
Dept: SPORTS MEDICINE | Facility: CLINIC | Age: 47
End: 2022-11-01
Payer: MEDICAID

## 2022-11-01 ENCOUNTER — TELEPHONE (OUTPATIENT)
Dept: SPORTS MEDICINE | Facility: CLINIC | Age: 47
End: 2022-11-01

## 2022-11-01 VITALS
HEIGHT: 70 IN | SYSTOLIC BLOOD PRESSURE: 133 MMHG | WEIGHT: 209 LBS | DIASTOLIC BLOOD PRESSURE: 97 MMHG | BODY MASS INDEX: 29.92 KG/M2

## 2022-11-01 DIAGNOSIS — M25.559 PAIN IN UNSPECIFIED HIP: ICD-10-CM

## 2022-11-01 DIAGNOSIS — R03.0 ELEVATED BLOOD PRESSURE READING: ICD-10-CM

## 2022-11-01 DIAGNOSIS — R26.9 ABNORMAL GAIT: ICD-10-CM

## 2022-11-01 DIAGNOSIS — M79.604 ACUTE PAIN OF RIGHT LOWER EXTREMITY: Primary | ICD-10-CM

## 2022-11-01 DIAGNOSIS — Z98.1 HISTORY OF LUMBAR SPINAL FUSION: ICD-10-CM

## 2022-11-01 PROCEDURE — 1125F PR PAIN SEVERITY QUANTIFIED, PAIN PRESENT: ICD-10-PCS | Mod: CPTII,,, | Performed by: STUDENT IN AN ORGANIZED HEALTH CARE EDUCATION/TRAINING PROGRAM

## 2022-11-01 PROCEDURE — 99204 PR OFFICE/OUTPT VISIT, NEW, LEVL IV, 45-59 MIN: ICD-10-PCS | Mod: S$PBB,,, | Performed by: STUDENT IN AN ORGANIZED HEALTH CARE EDUCATION/TRAINING PROGRAM

## 2022-11-01 PROCEDURE — 1160F RVW MEDS BY RX/DR IN RCRD: CPT | Mod: CPTII,,, | Performed by: STUDENT IN AN ORGANIZED HEALTH CARE EDUCATION/TRAINING PROGRAM

## 2022-11-01 PROCEDURE — 3080F PR MOST RECENT DIASTOLIC BLOOD PRESSURE >= 90 MM HG: ICD-10-PCS | Mod: CPTII,,, | Performed by: STUDENT IN AN ORGANIZED HEALTH CARE EDUCATION/TRAINING PROGRAM

## 2022-11-01 PROCEDURE — 99204 OFFICE O/P NEW MOD 45 MIN: CPT | Mod: S$PBB,,, | Performed by: STUDENT IN AN ORGANIZED HEALTH CARE EDUCATION/TRAINING PROGRAM

## 2022-11-01 PROCEDURE — 99213 OFFICE O/P EST LOW 20 MIN: CPT | Mod: PBBFAC,PN | Performed by: STUDENT IN AN ORGANIZED HEALTH CARE EDUCATION/TRAINING PROGRAM

## 2022-11-01 PROCEDURE — 1160F PR REVIEW ALL MEDS BY PRESCRIBER/CLIN PHARMACIST DOCUMENTED: ICD-10-PCS | Mod: CPTII,,, | Performed by: STUDENT IN AN ORGANIZED HEALTH CARE EDUCATION/TRAINING PROGRAM

## 2022-11-01 PROCEDURE — 99999 PR PBB SHADOW E&M-EST. PATIENT-LVL III: ICD-10-PCS | Mod: PBBFAC,,, | Performed by: STUDENT IN AN ORGANIZED HEALTH CARE EDUCATION/TRAINING PROGRAM

## 2022-11-01 PROCEDURE — 1159F MED LIST DOCD IN RCRD: CPT | Mod: CPTII,,, | Performed by: STUDENT IN AN ORGANIZED HEALTH CARE EDUCATION/TRAINING PROGRAM

## 2022-11-01 PROCEDURE — 1159F PR MEDICATION LIST DOCUMENTED IN MEDICAL RECORD: ICD-10-PCS | Mod: CPTII,,, | Performed by: STUDENT IN AN ORGANIZED HEALTH CARE EDUCATION/TRAINING PROGRAM

## 2022-11-01 PROCEDURE — 3075F SYST BP GE 130 - 139MM HG: CPT | Mod: CPTII,,, | Performed by: STUDENT IN AN ORGANIZED HEALTH CARE EDUCATION/TRAINING PROGRAM

## 2022-11-01 PROCEDURE — 3075F PR MOST RECENT SYSTOLIC BLOOD PRESS GE 130-139MM HG: ICD-10-PCS | Mod: CPTII,,, | Performed by: STUDENT IN AN ORGANIZED HEALTH CARE EDUCATION/TRAINING PROGRAM

## 2022-11-01 PROCEDURE — 99999 PR PBB SHADOW E&M-EST. PATIENT-LVL III: CPT | Mod: PBBFAC,,, | Performed by: STUDENT IN AN ORGANIZED HEALTH CARE EDUCATION/TRAINING PROGRAM

## 2022-11-01 PROCEDURE — 1125F AMNT PAIN NOTED PAIN PRSNT: CPT | Mod: CPTII,,, | Performed by: STUDENT IN AN ORGANIZED HEALTH CARE EDUCATION/TRAINING PROGRAM

## 2022-11-01 PROCEDURE — 3080F DIAST BP >= 90 MM HG: CPT | Mod: CPTII,,, | Performed by: STUDENT IN AN ORGANIZED HEALTH CARE EDUCATION/TRAINING PROGRAM

## 2022-11-01 NOTE — TELEPHONE ENCOUNTER
Returned call. Pt is scheduled at [facility] for 11/15. Pt scheduled for 11/17 to review results. Advised to bring copy of disc and have MRI report faxed.    Mimi Snowden MS, OTC  Clinical Assistant to Dr. Martha Royal    ----- Message from Shanna Antunez sent at 11/1/2022  2:45 PM CDT -----  Regarding: appt  Contact: pt @ [phone]  Pt is calling to speak with office about appt, asking for a call back

## 2022-11-01 NOTE — PROGRESS NOTES
"CC: right hip pain    47 y.o. Male presents today for evaluation of his right hip pain. Pt reports he injured his hip and low back at work in Oct 2020; pt states he had lumbar surgery in 06/2022. Pt states his hip and thigh pain have not resolved since his surgery. Pt reports global hip pain with referred shooting pain down anterior and posterior thigh and to his knee. Pt also notes tailbone pain. Pt presents today with cane for ambulation. Pt reports pain is 10/10 today. Pt denies mechanical symptoms in hip, but notes clicking/cracking in right knee. Pt states he heard a "pop" during injury in 2020 but isn't sure if it was in hip or low back. Pt denies numbness/tingling. Pt states pain mgmt is trying to get spinal stimulator approved.    Side: right  Problem: hip pain  Pain Score: 10/10  SYMPTOMS:   Time of onset: Oct 2020  Trauma, injury: work injury  Pain location: global    C-sign: yes    Radiation past knee: no    Exacerbating factors / difficult actions:    Nocturnal pain lying with ipsilateral side down: yes    Pivoting: yes    Putting on shoes / socks: yes    Getting into / out of cars: yes    Getting up / down from chairs: yes    Known back problems: prev hx of lumbar fusion June 2022  Weakness: no  Numbness: no  Mechanical symptoms:     Clicking, catching: no    Snapping internal: no    Snapping external: no    INTERVENTIONS:   Medications tried: norco, tylenol 500mg, valium 5mg  (no significant relief)  Physical therapy: heating pad, STIM unit  Injections: none to hip; Pain Mgmt trying to get epidural approved    RELEVANT HISTORY:   Imaging to date: 06/06/22    Occupation: unemployed (has not been to work for 2 yrs since injury)    REVIEW OF SYSTEMS:   Constitution: Patient denies fever or chills.  Eyes: Patient denies eye pain or vision changes.  HEENT: Patient denies ear pain, sore throat, or nasal discharge.  CVS: Patient denies chest pain.  Lungs: Patient denies shortness of breath or cough.  Abdomen: " Patient denies any stomach pain, nausea, vomiting, or diarrhea  Skin: Patient denies skin rash or itching.    Musculoskeletal: Patient denies recent injuries or trauma.  Neuro: Patient denies any numbness or tingling in upper or lower extremities.  Psych: Patient denies any current anxiety or nervousness.    PAST MEDICAL HISTORY:   Past Medical History:   Diagnosis Date    Anxiety     Depression     related to current illness    Seizures     Had seizures as a child       PAST SURGICAL HISTORY:  Past Surgical History:   Procedure Laterality Date    ABDOMINAL SURGERY      APPENDECTOMY      BACK SURGERY      COLON SURGERY      INJECTION, SACROILIAC JOINT Right 7/28/2022    Procedure: INJECTION,SACROILIAC JOINT RIGHT SI & RIGHT GTB;  Surgeon: Ramya Lundy MD;  Location: [facility];  Service: Pain Management;  Laterality: Right;    MINIMALLY INVASIVE TRANSFORAMINAL LUMBAR INTERBODY FUSION (TLIF) N/A 6/2/2022    Procedure: FUSION, SPINE, LUMBAR, TLIF, MINIMALLY INVASIVE L4-5, L5-S1;  Surgeon: Humberto Rhodes MD;  Location: [facility] OR [address] [place] [address];  Service: Neurosurgery;  Laterality: N/A;  [place] Table 4 poster, prone, neuromonitoring, globus robot, Heaven Giron     TRANSFORAMINAL EPIDURAL INJECTION OF STEROID Right 07/12/2021    Procedure: LUMBAR TRANSFORAMINAL RIGHT L4/5 DIRECT REFERRAL NEEDS CONSENT;  Surgeon: Ramya Lundy MD;  Location: [facility];  Service: Pain Management;  Laterality: Right;       FAMILY HISTORY:  Family History   Problem Relation Age of Onset    Cancer Mother     Heart attack Mother     Suicide Father        SOCIAL HISTORY:  Social History     Socioeconomic History    Marital status:     Number of children: 7   Tobacco Use    Smoking status: Former     Packs/day: 0.50     Years: 10.00     Pack years: 5.00     Types: Cigarettes    Smokeless tobacco: Never    Tobacco comments:     Stopped 2 weeks ago   Substance and Sexual Activity    Alcohol use: No     Comment: socially- wedding or party     "Drug use: No    Sexual activity: Not Currently     Partners: Female   Social History Narrative    Stairs- none       MEDICATIONS:     Current Outpatient Medications:     DULoxetine (CYMBALTA) 30 MG capsule, TAKE 1 CAPSULE(30 MG) BY MOUTH EVERY DAY, Disp: 30 capsule, Rfl: 1    acetaminophen (TYLENOL) 500 MG tablet, Take 1 tablet (500 mg total) by mouth 2 (two) times a day. (Patient not taking: Reported on 2022), Disp: 60 tablet, Rfl: 0    ondansetron (ZOFRAN-ODT) 4 MG TbDL, Dissolve 2 tablets (8 mg total) by mouth every 8 (eight) hours as needed (nausea). (Patient not taking: Reported on 2022), Disp: 30 tablet, Rfl: 0    ALLERGIES:   Review of patient's allergies indicates:  No Known Allergies     IMAGIN. Hip X-ray ordered due to right hip pain. 2 views taken 22.   2. X-ray images were reviewed personally by me and then directly with patient.  3. FINDINGS: Reconfirmed findings of bilateral posterior hardware instrumented fusion of L4 through S1 vertebral segments with inter disc hardware at the L4-L5 level.  Visualized hardware intact.  Reconfirmed posterior skin staples.  Intact right and left SI joints.  No definite narrowing of right or left hip joint spaces as before.  Mild right and left acetabular roof spurring and some periarticular right and left hip joint space soft tissue calcification as before.  Preserved right and left femoral head contours.    4. IMPRESSION:  As above.    PHYSICAL EXAMINATION:  BP (!) 133/97   Ht 5' 10" (1.778 m)   Wt 94.8 kg (208 lb 15.9 oz)   BMI 29.99 kg/m²   Vitals signs and nursing note have been reviewed.    General: In no acute distress, well developed, well nourished, no diaphoresis  Eyes: EOM full and smooth, no eye redness or discharge  HEENT: normocephalic and atraumatic, neck supple, trachea midline, no nasal discharge  Cardiovascular: no LE edema  Lungs: respirations non-labored, no conversational dyspnea   Neuro: AAOx3, CN2-12 grossly " intact  Skin: No rashes, warm and dry  Psychiatric: cooperative, pleasant, mood and affect appropriate for age    Right Hip:   Gait: antalgic assisted with cane    Inspection/Palpation:   -Deformities     TTP:  -Greater trochanter  -Abductors  -ASIS  -AIIS   -Ischial tuberosity/hamstring origin  -Mid substance hamstring  -SIJ  -ITB    ROM (* = with pain):   Flexion: 45°  Extension: 10  Unable to assess IR and ER 2/2 pain      Functional:   +Log roll   +NGOZI  +FADIR  +Stinchfield test  +Lasegue's         ASSESSMENT:      ICD-10-CM ICD-9-CM   1. Hip pain, acute, right  M25.551 719.45         PLAN:  Patient history, physical exam findings, and imaging I believe patient's pain generator could likely be his hip and or his lumbar spine.  Patient was signs and symptoms consistent with intra-articular pathology.  Based on the amount of pain patient was in today, I believe is good practice of medicine to rule out further pathology of his hip not limited to occult fracture or avascular necrosis.  Will move for with open MRI.    Future planning includes - open MRI    All questions were answered to the best of my ability and all concerns were addressed at this time.    Follow up for above, or sooner if needed.      This note is dictated using the M*Modal Fluency Direct word recognition program. There are word recognition mistakes that are occasionally missed on review.

## 2022-11-11 ENCOUNTER — TELEPHONE (OUTPATIENT)
Dept: SPORTS MEDICINE | Facility: CLINIC | Age: 47
End: 2022-11-11
Payer: MEDICAID

## 2022-11-11 NOTE — TELEPHONE ENCOUNTER
Returned call to patient to let him know that per [employer] workers comp department the referral is still pending.

## 2022-11-11 NOTE — TELEPHONE ENCOUNTER
----- Message from Reema El sent at 11/11/2022 11:58 AM CST -----  Contact: Pt  Pt is requesting a callback from Mimi Snowden in regards to MRI.     Confirmed contact below:   Contact Name:Junior Garay  Phone Number: [phone]

## 2022-11-18 ENCOUNTER — TELEPHONE (OUTPATIENT)
Dept: SPORTS MEDICINE | Facility: CLINIC | Age: 47
End: 2022-11-18
Payer: MEDICAID

## 2022-11-18 NOTE — TELEPHONE ENCOUNTER
Called and spoke to patient.  Informed him that his referral is still pending and we would get an update Monday from workers comp department.

## 2022-11-18 NOTE — TELEPHONE ENCOUNTER
----- Message from Shanna Antunez sent at 11/18/2022  4:13 PM CST -----  Regarding: MRI Approval  Contact: pt @ [phone]  Message is for greg Le is calling to see if MRI was approved by Workers Comp. Asking for a call back

## 2023-06-07 ENCOUNTER — TELEPHONE (OUTPATIENT)
Dept: SPORTS MEDICINE | Facility: CLINIC | Age: 48
End: 2023-06-07
Payer: MEDICAID

## 2023-06-07 NOTE — TELEPHONE ENCOUNTER
Returned call, scheduled for next available on 6/10. Sent message to W/C to determine status of MRI.    Mimi Snowden MS, OTC  Clinical Assistant to Dr. Martha Royal      ----- Message from Meghan Cary sent at 6/7/2023 12:39 PM CDT -----  Regarding: appt access  Contact: [phone]  Junior Jarrod calling regarding Appointment Access  (message) for #right hip pain. He states it's going down to his right leg/calf as well. Please call

## 2023-06-10 ENCOUNTER — PATIENT MESSAGE (OUTPATIENT)
Dept: SPORTS MEDICINE | Facility: CLINIC | Age: 48
End: 2023-06-10

## 2023-06-10 ENCOUNTER — OFFICE VISIT (OUTPATIENT)
Dept: SPORTS MEDICINE | Facility: CLINIC | Age: 48
End: 2023-06-10
Payer: MEDICAID

## 2023-06-10 ENCOUNTER — TELEPHONE (OUTPATIENT)
Dept: SPORTS MEDICINE | Facility: CLINIC | Age: 48
End: 2023-06-10

## 2023-06-10 VITALS
DIASTOLIC BLOOD PRESSURE: 88 MMHG | SYSTOLIC BLOOD PRESSURE: 125 MMHG | HEART RATE: 85 BPM | WEIGHT: 221 LBS | HEIGHT: 70 IN | BODY MASS INDEX: 31.64 KG/M2

## 2023-06-10 DIAGNOSIS — M79.604 ACUTE PAIN OF RIGHT LOWER EXTREMITY: ICD-10-CM

## 2023-06-10 DIAGNOSIS — M25.551 PAIN OF RIGHT HIP: Primary | ICD-10-CM

## 2023-06-10 DIAGNOSIS — Z98.1 HISTORY OF LUMBAR SPINAL FUSION: ICD-10-CM

## 2023-06-10 DIAGNOSIS — R26.9 ABNORMAL GAIT: ICD-10-CM

## 2023-06-10 PROCEDURE — 99999 PR PBB SHADOW E&M-EST. PATIENT-LVL III: ICD-10-PCS | Mod: PBBFAC,,, | Performed by: STUDENT IN AN ORGANIZED HEALTH CARE EDUCATION/TRAINING PROGRAM

## 2023-06-10 PROCEDURE — 4010F PR ACE/ARB THEARPY RXD/TAKEN: ICD-10-PCS | Mod: CPTII,,, | Performed by: STUDENT IN AN ORGANIZED HEALTH CARE EDUCATION/TRAINING PROGRAM

## 2023-06-10 PROCEDURE — 3008F BODY MASS INDEX DOCD: CPT | Mod: CPTII,,, | Performed by: STUDENT IN AN ORGANIZED HEALTH CARE EDUCATION/TRAINING PROGRAM

## 2023-06-10 PROCEDURE — 3044F HG A1C LEVEL LT 7.0%: CPT | Mod: CPTII,,, | Performed by: STUDENT IN AN ORGANIZED HEALTH CARE EDUCATION/TRAINING PROGRAM

## 2023-06-10 PROCEDURE — 3008F PR BODY MASS INDEX (BMI) DOCUMENTED: ICD-10-PCS | Mod: CPTII,,, | Performed by: STUDENT IN AN ORGANIZED HEALTH CARE EDUCATION/TRAINING PROGRAM

## 2023-06-10 PROCEDURE — 3074F SYST BP LT 130 MM HG: CPT | Mod: CPTII,,, | Performed by: STUDENT IN AN ORGANIZED HEALTH CARE EDUCATION/TRAINING PROGRAM

## 2023-06-10 PROCEDURE — 3079F DIAST BP 80-89 MM HG: CPT | Mod: CPTII,,, | Performed by: STUDENT IN AN ORGANIZED HEALTH CARE EDUCATION/TRAINING PROGRAM

## 2023-06-10 PROCEDURE — 3044F PR MOST RECENT HEMOGLOBIN A1C LEVEL <7.0%: ICD-10-PCS | Mod: CPTII,,, | Performed by: STUDENT IN AN ORGANIZED HEALTH CARE EDUCATION/TRAINING PROGRAM

## 2023-06-10 PROCEDURE — 1160F PR REVIEW ALL MEDS BY PRESCRIBER/CLIN PHARMACIST DOCUMENTED: ICD-10-PCS | Mod: CPTII,,, | Performed by: STUDENT IN AN ORGANIZED HEALTH CARE EDUCATION/TRAINING PROGRAM

## 2023-06-10 PROCEDURE — 1125F AMNT PAIN NOTED PAIN PRSNT: CPT | Mod: CPTII,,, | Performed by: STUDENT IN AN ORGANIZED HEALTH CARE EDUCATION/TRAINING PROGRAM

## 2023-06-10 PROCEDURE — 99213 OFFICE O/P EST LOW 20 MIN: CPT | Mod: PBBFAC | Performed by: STUDENT IN AN ORGANIZED HEALTH CARE EDUCATION/TRAINING PROGRAM

## 2023-06-10 PROCEDURE — 99214 PR OFFICE/OUTPT VISIT, EST, LEVL IV, 30-39 MIN: ICD-10-PCS | Mod: S$PBB,,, | Performed by: STUDENT IN AN ORGANIZED HEALTH CARE EDUCATION/TRAINING PROGRAM

## 2023-06-10 PROCEDURE — 1125F PR PAIN SEVERITY QUANTIFIED, PAIN PRESENT: ICD-10-PCS | Mod: CPTII,,, | Performed by: STUDENT IN AN ORGANIZED HEALTH CARE EDUCATION/TRAINING PROGRAM

## 2023-06-10 PROCEDURE — 1159F PR MEDICATION LIST DOCUMENTED IN MEDICAL RECORD: ICD-10-PCS | Mod: CPTII,,, | Performed by: STUDENT IN AN ORGANIZED HEALTH CARE EDUCATION/TRAINING PROGRAM

## 2023-06-10 PROCEDURE — 99999 PR PBB SHADOW E&M-EST. PATIENT-LVL III: CPT | Mod: PBBFAC,,, | Performed by: STUDENT IN AN ORGANIZED HEALTH CARE EDUCATION/TRAINING PROGRAM

## 2023-06-10 PROCEDURE — 3074F PR MOST RECENT SYSTOLIC BLOOD PRESSURE < 130 MM HG: ICD-10-PCS | Mod: CPTII,,, | Performed by: STUDENT IN AN ORGANIZED HEALTH CARE EDUCATION/TRAINING PROGRAM

## 2023-06-10 PROCEDURE — 3079F PR MOST RECENT DIASTOLIC BLOOD PRESSURE 80-89 MM HG: ICD-10-PCS | Mod: CPTII,,, | Performed by: STUDENT IN AN ORGANIZED HEALTH CARE EDUCATION/TRAINING PROGRAM

## 2023-06-10 PROCEDURE — 1160F RVW MEDS BY RX/DR IN RCRD: CPT | Mod: CPTII,,, | Performed by: STUDENT IN AN ORGANIZED HEALTH CARE EDUCATION/TRAINING PROGRAM

## 2023-06-10 PROCEDURE — 1159F MED LIST DOCD IN RCRD: CPT | Mod: CPTII,,, | Performed by: STUDENT IN AN ORGANIZED HEALTH CARE EDUCATION/TRAINING PROGRAM

## 2023-06-10 PROCEDURE — 99214 OFFICE O/P EST MOD 30 MIN: CPT | Mod: S$PBB,,, | Performed by: STUDENT IN AN ORGANIZED HEALTH CARE EDUCATION/TRAINING PROGRAM

## 2023-06-10 PROCEDURE — 4010F ACE/ARB THERAPY RXD/TAKEN: CPT | Mod: CPTII,,, | Performed by: STUDENT IN AN ORGANIZED HEALTH CARE EDUCATION/TRAINING PROGRAM

## 2023-06-10 NOTE — PROGRESS NOTES
"CC: right hip pain    48 y.o. Male presents today for follow up evaluation of his right hip pain. Pt reports no improvement since Nov '22. Pt states MRI that was scheduled back in November was denied. Pt reports pain is 8.5/10 today. Pt localizes pain to posterolateral hip, lateral thigh, lateral knee and proximal lateral lower leg. Pt denies mechanical symptoms. Pt denies numbness/tingling. Pt states he is moving forward with treatment without worker's comp coverage.     Attempted treatments: SOREN with Pain clinic, fPT, cane   Pain score: 8.5 / 10  History of trauma/injury: none since last visit  Affecting ADLs: yes      REVIEW OF SYSTEMS:   Constitution: Patient denies fever or chills.  Eyes: Patient denies eye pain or vision changes.  HEENT: Patient denies ear pain, sore throat, or nasal discharge.  CVS: Patient denies chest pain.  Lungs: Patient denies shortness of breath or cough.  Skin: Patient denies skin rash or itching.    Musculoskeletal: Patient denies recent falls. See HPI.  Psych: Patient denies any current anxiety or nervousness.    PAST MEDICAL HISTORY:   Past Medical History:   Diagnosis Date    Anxiety     Depression     related to current illness    Seizures     Had seizures as a child       MEDICATIONS:     Current Outpatient Medications:     cyclobenzaprine (FLEXERIL) 10 MG tablet, , Disp: , Rfl:     losartan (COZAAR) 100 MG tablet, Take 1 tablet (100 mg total) by mouth once daily., Disp: 30 tablet, Rfl: 6    ALLERGIES:   Review of patient's allergies indicates:  No Known Allergies     PHYSICAL EXAMINATION:  /88   Pulse 85   Ht 5' 10" (1.778 m)   Wt 100.2 kg (221 lb)   BMI 31.71 kg/m²   Vitals signs and nursing note have been reviewed.    General: In no acute distress, well developed, well nourished, no diaphoresis  Eyes: EOM full and smooth, no eye redness or discharge  HENT: normocephalic and atraumatic, neck supple, trachea midline, no nasal discharge  Cardiovascular: no LE " edema  Lungs: respirations non-labored, no conversational dyspnea   Neuro: AAOx3, CN2-12 grossly intact  Skin: No rashes, warm and dry  Psychiatric: cooperative, pleasant, mood and affect appropriate for age    Right Hip:   Gait: antalgic assisted with cane     Inspection/Palpation:   -Deformities      TTP:  -Greater trochanter  -Abductors  -ASIS  -AIIS   -Ischial tuberosity/hamstring origin  -Mid substance hamstring  -SIJ  -ITB     ROM (* = with pain):   *Flexion: 45°  *Extension: 10  *IR: 10  *ER: 30        Functional:   +Log roll   +NGOZI  +FADIR  +Stinchfield test  +Lasegue's     ASSESSMENT:      ICD-10-CM ICD-9-CM   1. Pain of right hip  M25.551 719.45   2. Acute pain of right lower extremity  M79.604 729.5   3. History of lumbar spinal fusion  Z98.1 V45.4   4. Abnormal gait  R26.9 781.2         PLAN:  In keeping with previous plan, would like to move for with MRI right hip for further evaluation as I am concerned for occult fracture or avascular necrosis.  I am still concerned that pain might be coming from patient's lumbar spine.  Follow-up after MRI.    Future planning includes - MRI right hip    All questions were answered to the best of my ability and all concerns were addressed at this time.    Follow up for above, or sooner if needed.      This note is dictated using the M*Modal Fluency Direct word recognition program. There are word recognition mistakes that are occasionally missed on review.

## 2023-06-10 NOTE — TELEPHONE ENCOUNTER
Patient was [facility] that [facility] Patient requested a Open MRI, and was informed that the details will be put in his portal to instruct him on what to do to get scheduled for his open MRI. Dr. Royal staff was informed.

## 2023-06-12 ENCOUNTER — TELEPHONE (OUTPATIENT)
Dept: SPORTS MEDICINE | Facility: CLINIC | Age: 48
End: 2023-06-12
Payer: MEDICAID

## 2023-06-12 NOTE — TELEPHONE ENCOUNTER
Returned call, pt states he will call back with availability for f/u appt. Email sent to pre-service regarding external referral.    Mimi Snowden MS, OTC  Clinical Assistant to Dr. Martha Royal      ----- Message from Prasanna Acevedo sent at 6/12/2023 11:39 AM CDT -----  Regarding: adv  Contact: @[phone]  Pt calling to let dr know he got mri sched on 6/21 at place dr infante .. pls call and adv@[phone]

## 2023-06-21 ENCOUNTER — TELEPHONE (OUTPATIENT)
Dept: SPORTS MEDICINE | Facility: CLINIC | Age: 48
End: 2023-06-21
Payer: MEDICAID

## 2023-06-21 NOTE — TELEPHONE ENCOUNTER
Returned call, pt states he was unable to move forward with open MRI due to claustrophobia. Pt requesting stand up MRI. Advised pt that I will call [facility] in [place] tomorrow morning and coordinate sending order if they are able to do hip MRIs.    Mimi Snowden MS, OTC  Clinical Assistant to Dr. Martha Royal      ----- Message from Luly Patel MA sent at 6/21/2023  2:51 PM CDT -----  Regarding: FW: appt access  Contact: pt [phone]    ----- Message -----  From: Linda Gray  Sent: 6/21/2023   2:28 PM CDT  To: Genny MARION Staff  Subject: appt access                                      Pt calling to see if he can be rescheduled for a standup MRI, pt could not go through with MRI. Pls call

## 2023-06-22 ENCOUNTER — PATIENT MESSAGE (OUTPATIENT)
Dept: SPORTS MEDICINE | Facility: CLINIC | Age: 48
End: 2023-06-22
Payer: MEDICAID

## 2023-06-22 DIAGNOSIS — M25.551 PAIN OF RIGHT HIP: Primary | ICD-10-CM

## 2023-06-22 DIAGNOSIS — M79.604 ACUTE PAIN OF RIGHT LOWER EXTREMITY: ICD-10-CM

## 2023-07-03 ENCOUNTER — TELEPHONE (OUTPATIENT)
Dept: SPORTS MEDICINE | Facility: CLINIC | Age: 48
End: 2023-07-03
Payer: MEDICAID

## 2023-07-03 NOTE — TELEPHONE ENCOUNTER
"Pt called clinic advising that [facility] MRI location does not take his insurance. Advised pt that he will need to call his insurance company to determine which stand up locations take his insurance. Advised that we will send referral over to whichever location his insurance is accepted. Pt verbalized understanding.     Pt also noted he had significant pain and states his "body shut down" following receiving injection into his back recently. Advised pt to contact physician's office that administered injection to notify them of adverse reaction to injection.     Mimi Snowden MS, OTC  Clinical Assistant to Dr. Martha Royal    "

## 2023-08-04 ENCOUNTER — PATIENT OUTREACH (OUTPATIENT)
Dept: ADMINISTRATIVE | Facility: HOSPITAL | Age: 48
End: 2023-08-04
Payer: MEDICAID

## 2023-08-14 ENCOUNTER — TELEPHONE (OUTPATIENT)
Dept: SPORTS MEDICINE | Facility: CLINIC | Age: 48
End: 2023-08-14
Payer: MEDICAID

## 2023-08-14 NOTE — TELEPHONE ENCOUNTER
Called and spoke to patient.  He will reach out to [facility] to reschedule his Open MRI.  Patient will contact the office to inform when his MRI is scheduled.  Patient asked for medication to be called in to relax him for his MRI.  Message forwarded to Dr. Royal for this to be called into [facility] in [place]

## 2023-08-14 NOTE — TELEPHONE ENCOUNTER
----- Message from Glenis Givens sent at 8/14/2023 12:13 PM CDT -----  Regarding: MRI  Contact: pt  Pt calling in regards to his MRI     Confirmed patient's contact info below:  Contact Name: Junior Garay  Phone Number: [phone]

## 2023-08-16 ENCOUNTER — TELEPHONE (OUTPATIENT)
Dept: SPORTS MEDICINE | Facility: CLINIC | Age: 48
End: 2023-08-16
Payer: MEDICAID

## 2023-08-16 NOTE — TELEPHONE ENCOUNTER
Called and left voicemail for patient to let him know that we will send a message to get his MRI authorized and the medication will be sent on 8/29/23 for MRI scheduled on 8/30/23

## 2023-08-16 NOTE — TELEPHONE ENCOUNTER
----- Message from Meghan Cary sent at 8/16/2023  1:48 PM CDT -----  Regarding: pt advice  Contact: [phone]  Junior Garay calling regarding Patient Advice (message) for #requesting a call back regarding getting in touch with insurance to get his MRI authorized. He states that his appt is on 8.30 and if some meds can be sent to the pharmacy so he can do it. Please call      [id number] - [place] - [address] AT [facility] [address]  [address] [place] [address]  Phone: [phone] Fax: [phone]

## 2023-08-17 ENCOUNTER — TELEPHONE (OUTPATIENT)
Dept: SPORTS MEDICINE | Facility: CLINIC | Age: 48
End: 2023-08-17
Payer: MEDICAID

## 2023-08-17 DIAGNOSIS — M25.551 PAIN OF RIGHT HIP: ICD-10-CM

## 2023-08-17 DIAGNOSIS — F40.240 CLAUSTROPHOBIA: Primary | ICD-10-CM

## 2023-08-17 RX ORDER — ALPRAZOLAM 1 MG/1
1 TABLET ORAL ONCE AS NEEDED
Qty: 2 TABLET | Refills: 0 | Status: SHIPPED | OUTPATIENT
Start: 2023-08-17 | End: 2023-12-20 | Stop reason: SDUPTHER

## 2023-08-17 NOTE — TELEPHONE ENCOUNTER
----- Message from Martha Royal MD sent at 8/17/2023  7:16 AM CDT -----  Regarding: RE: pt advice  Contact: [phone]  Done.   ----- Message -----  From: Luly Patel MA  Sent: 8/16/2023   2:10 PM CDT  To: Martha Royal MD  Subject: FW: pt advice                                    Patient needs new MRI order for [facility] and his medication sent to the pharmacy, his MRI has been scheduled for 8/30/23.    Thank you  Luly  ----- Message -----  From: Meghan Cary  Sent: 8/16/2023   2:02 PM CDT  To: Genny MARION Staff  Subject: pt advice                                        Junior Garay calling regarding Patient Advice (message) for #requesting a call back regarding getting in touch with insurance to get his MRI authorized. He states that his appt is on 8.30 and if some meds can be sent to the pharmacy so he can do it. Please call      [id number] - [place] - [address] AT [facility] [address]  [place] [address]  Phone: [phone] Fax: [phone]

## 2023-08-17 NOTE — TELEPHONE ENCOUNTER
Called and left voicemail for patient to let him know that his MRI order has been faxed to [facility] and his prescription sent to the pharmacy

## 2023-08-17 NOTE — TELEPHONE ENCOUNTER
----- Message from Luly Patel MA sent at 8/16/2023  2:10 PM CDT -----  Regarding: FW: pt advice  Contact: [phone]  Patient needs new MRI order for [facility] and his medication sent to the pharmacy, his MRI has been scheduled for 8/30/23.    Thank you  Luly  ----- Message -----  From: Meghan Cary  Sent: 8/16/2023   2:02 PM CDT  To: Genny MARION Staff  Subject: pt advice                                        Junior Garay calling regarding Patient Advice (message) for #requesting a call back regarding getting in touch with insurance to get his MRI authorized. He states that his appt is on 8.30 and if some meds can be sent to the pharmacy so he can do it. Please call      [id number] - [place] - [address] AT [facility]  [address] [place] [address]  Phone: [phone] Fax: [phone]

## 2023-08-30 ENCOUNTER — TELEPHONE (OUTPATIENT)
Dept: SPORTS MEDICINE | Facility: CLINIC | Age: 48
End: 2023-08-30
Payer: MEDICAID

## 2023-08-30 NOTE — TELEPHONE ENCOUNTER
Returned call, advised pt that we will need copy of radiology report. Pt states she thinks that facility is sending over copy of disc. Advised I will call MRI facility to confirm.    Mimi Snowden MS, OTC  Clinical Assistant to Dr. Martha Royal      ----- Message from Glenis Givens sent at 8/30/2023 11:20 AM CDT -----  Regarding: MRI  Pt calling to see if office will request a copy of his MRI he just took [facility]       Confirmed patient's contact info below:  Contact Name: Junior Garay  Phone Number: [phone]

## 2023-09-09 ENCOUNTER — OFFICE VISIT (OUTPATIENT)
Dept: SPORTS MEDICINE | Facility: CLINIC | Age: 48
End: 2023-09-09
Payer: MEDICAID

## 2023-09-09 VITALS
DIASTOLIC BLOOD PRESSURE: 96 MMHG | SYSTOLIC BLOOD PRESSURE: 133 MMHG | HEIGHT: 70 IN | HEART RATE: 97 BPM | BODY MASS INDEX: 31.64 KG/M2 | WEIGHT: 221 LBS

## 2023-09-09 DIAGNOSIS — R26.9 ABNORMAL GAIT: ICD-10-CM

## 2023-09-09 DIAGNOSIS — M25.551 PAIN OF RIGHT HIP: ICD-10-CM

## 2023-09-09 DIAGNOSIS — S73.191A TEAR OF RIGHT ACETABULAR LABRUM, INITIAL ENCOUNTER: Primary | ICD-10-CM

## 2023-09-09 DIAGNOSIS — R03.0 ELEVATED BLOOD PRESSURE READING: ICD-10-CM

## 2023-09-09 PROCEDURE — 20611 DRAIN/INJ JOINT/BURSA W/US: CPT | Mod: PBBFAC,RT | Performed by: STUDENT IN AN ORGANIZED HEALTH CARE EDUCATION/TRAINING PROGRAM

## 2023-09-09 PROCEDURE — 99214 OFFICE O/P EST MOD 30 MIN: CPT | Mod: 25,S$PBB,, | Performed by: STUDENT IN AN ORGANIZED HEALTH CARE EDUCATION/TRAINING PROGRAM

## 2023-09-09 PROCEDURE — 3044F HG A1C LEVEL LT 7.0%: CPT | Mod: CPTII,,, | Performed by: STUDENT IN AN ORGANIZED HEALTH CARE EDUCATION/TRAINING PROGRAM

## 2023-09-09 PROCEDURE — 99999PBSHW PR PBB SHADOW TECHNICAL ONLY FILED TO HB: ICD-10-PCS | Mod: PBBFAC,,,

## 2023-09-09 PROCEDURE — 99999 PR PBB SHADOW E&M-EST. PATIENT-LVL IV: CPT | Mod: PBBFAC,,, | Performed by: STUDENT IN AN ORGANIZED HEALTH CARE EDUCATION/TRAINING PROGRAM

## 2023-09-09 PROCEDURE — 99214 OFFICE O/P EST MOD 30 MIN: CPT | Mod: PBBFAC | Performed by: STUDENT IN AN ORGANIZED HEALTH CARE EDUCATION/TRAINING PROGRAM

## 2023-09-09 PROCEDURE — 1160F RVW MEDS BY RX/DR IN RCRD: CPT | Mod: CPTII,,, | Performed by: STUDENT IN AN ORGANIZED HEALTH CARE EDUCATION/TRAINING PROGRAM

## 2023-09-09 PROCEDURE — 20611 LARGE JOINT ASPIRATION/INJECTION: R HIP JOINT: ICD-10-PCS | Mod: S$PBB,RT,, | Performed by: STUDENT IN AN ORGANIZED HEALTH CARE EDUCATION/TRAINING PROGRAM

## 2023-09-09 PROCEDURE — 3080F DIAST BP >= 90 MM HG: CPT | Mod: CPTII,,, | Performed by: STUDENT IN AN ORGANIZED HEALTH CARE EDUCATION/TRAINING PROGRAM

## 2023-09-09 PROCEDURE — 1125F PR PAIN SEVERITY QUANTIFIED, PAIN PRESENT: ICD-10-PCS | Mod: CPTII,,, | Performed by: STUDENT IN AN ORGANIZED HEALTH CARE EDUCATION/TRAINING PROGRAM

## 2023-09-09 PROCEDURE — 3080F PR MOST RECENT DIASTOLIC BLOOD PRESSURE >= 90 MM HG: ICD-10-PCS | Mod: CPTII,,, | Performed by: STUDENT IN AN ORGANIZED HEALTH CARE EDUCATION/TRAINING PROGRAM

## 2023-09-09 PROCEDURE — 99999PBSHW PR PBB SHADOW TECHNICAL ONLY FILED TO HB: Mod: PBBFAC,,,

## 2023-09-09 PROCEDURE — 1159F MED LIST DOCD IN RCRD: CPT | Mod: CPTII,,, | Performed by: STUDENT IN AN ORGANIZED HEALTH CARE EDUCATION/TRAINING PROGRAM

## 2023-09-09 PROCEDURE — 4010F PR ACE/ARB THEARPY RXD/TAKEN: ICD-10-PCS | Mod: CPTII,,, | Performed by: STUDENT IN AN ORGANIZED HEALTH CARE EDUCATION/TRAINING PROGRAM

## 2023-09-09 PROCEDURE — 4010F ACE/ARB THERAPY RXD/TAKEN: CPT | Mod: CPTII,,, | Performed by: STUDENT IN AN ORGANIZED HEALTH CARE EDUCATION/TRAINING PROGRAM

## 2023-09-09 PROCEDURE — 1160F PR REVIEW ALL MEDS BY PRESCRIBER/CLIN PHARMACIST DOCUMENTED: ICD-10-PCS | Mod: CPTII,,, | Performed by: STUDENT IN AN ORGANIZED HEALTH CARE EDUCATION/TRAINING PROGRAM

## 2023-09-09 PROCEDURE — 3075F PR MOST RECENT SYSTOLIC BLOOD PRESS GE 130-139MM HG: ICD-10-PCS | Mod: CPTII,,, | Performed by: STUDENT IN AN ORGANIZED HEALTH CARE EDUCATION/TRAINING PROGRAM

## 2023-09-09 PROCEDURE — 3008F BODY MASS INDEX DOCD: CPT | Mod: CPTII,,, | Performed by: STUDENT IN AN ORGANIZED HEALTH CARE EDUCATION/TRAINING PROGRAM

## 2023-09-09 PROCEDURE — 3075F SYST BP GE 130 - 139MM HG: CPT | Mod: CPTII,,, | Performed by: STUDENT IN AN ORGANIZED HEALTH CARE EDUCATION/TRAINING PROGRAM

## 2023-09-09 PROCEDURE — 1125F AMNT PAIN NOTED PAIN PRSNT: CPT | Mod: CPTII,,, | Performed by: STUDENT IN AN ORGANIZED HEALTH CARE EDUCATION/TRAINING PROGRAM

## 2023-09-09 PROCEDURE — 99999 PR PBB SHADOW E&M-EST. PATIENT-LVL IV: ICD-10-PCS | Mod: PBBFAC,,, | Performed by: STUDENT IN AN ORGANIZED HEALTH CARE EDUCATION/TRAINING PROGRAM

## 2023-09-09 PROCEDURE — 3008F PR BODY MASS INDEX (BMI) DOCUMENTED: ICD-10-PCS | Mod: CPTII,,, | Performed by: STUDENT IN AN ORGANIZED HEALTH CARE EDUCATION/TRAINING PROGRAM

## 2023-09-09 PROCEDURE — 99214 PR OFFICE/OUTPT VISIT, EST, LEVL IV, 30-39 MIN: ICD-10-PCS | Mod: 25,S$PBB,, | Performed by: STUDENT IN AN ORGANIZED HEALTH CARE EDUCATION/TRAINING PROGRAM

## 2023-09-09 PROCEDURE — 1159F PR MEDICATION LIST DOCUMENTED IN MEDICAL RECORD: ICD-10-PCS | Mod: CPTII,,, | Performed by: STUDENT IN AN ORGANIZED HEALTH CARE EDUCATION/TRAINING PROGRAM

## 2023-09-09 PROCEDURE — 3044F PR MOST RECENT HEMOGLOBIN A1C LEVEL <7.0%: ICD-10-PCS | Mod: CPTII,,, | Performed by: STUDENT IN AN ORGANIZED HEALTH CARE EDUCATION/TRAINING PROGRAM

## 2023-09-09 RX ORDER — TRIAMCINOLONE ACETONIDE 40 MG/ML
40 INJECTION, SUSPENSION INTRA-ARTICULAR; INTRAMUSCULAR
Status: DISCONTINUED | OUTPATIENT
Start: 2023-09-09 | End: 2023-09-09 | Stop reason: HOSPADM

## 2023-09-09 RX ADMIN — TRIAMCINOLONE ACETONIDE 40 MG: 40 INJECTION, SUSPENSION INTRA-ARTICULAR; INTRAMUSCULAR at 09:09

## 2023-09-09 NOTE — LETTER
Assessment/Plan:  Calcium 1200-1500mg + 600-1000 IU Vit D daily  Exercise  including weight bearing exercises  Pap with high risk HPV Q 5 years  Annual mammogram and monthly breast self exam recommended  Colonoscopy-          Damaris Rizzo 20 times twice daily  Diagnoses and all orders for this visit:    Encounter for gynecological examination without abnormal finding  -     Thinprep Pap and HPV mRNA E6/E7 Reflex HPV 16,18/45    Encounter for screening mammogram for malignant neoplasm of breast  -     Mammo screening bilateral w 3d & cad; Future    Vaginal atrophy  Comments:  estrogen prior to exams     Encounter for Papanicolaou smear for cervical cancer screening  -     Thinprep Pap and HPV mRNA E6/E7 Reflex HPV 16,18/45    Primary hypertension  Comments:  f/u PCP    Other orders  -     esomeprazole (NexIUM) 20 mg capsule; Take 20 mg by mouth every morning before breakfast          Subjective:      Patient ID: Jacek Contreras is a 63 y o  female  NP here for annual gyn  Last seen 2019  age 47 Last pap  neg/neg No h/o abn pap  passed away this year Had been sick x 7 years (AML s/p BMT) Pt had genetic testing  Mammo 22 normal       The following portions of the patient's history were reviewed and updated as appropriate: allergies, current medications, past family history, past medical history, past social history, past surgical history and problem list     Review of Systems   Constitutional: Negative for fatigue and unexpected weight change  Gastrointestinal: Negative for abdominal distention, abdominal pain, constipation and diarrhea  Genitourinary: Negative for difficulty urinating, dyspareunia, dysuria, frequency, genital sores, menstrual problem, pelvic pain, urgency, vaginal bleeding, vaginal discharge and vaginal pain  Neurological: Negative for headaches  Psychiatric/Behavioral: Negative  Negative for dysphoric mood  The patient is not nervous/anxious  [facility]  [facility] [address]  [place] [address]  Phone: [phone]  Fax: [phone] September 9, 2023     Patient: Junior Garay   YOB: 1975   Date of Visit: 9/9/2023       To Whom It May Concern:    Please excuse Junior Garay Jr. from missing work this morning, as Junior Garay Sr. was a patient in our clinic.    If you have any questions or concerns, please don't hesitate to contact my office.    Sincerely,        Martha Royal MD      Objective:      /100   Ht 5' 2 75" (1 594 m)   Wt 96 4 kg (212 lb 9 6 oz)   Breastfeeding No   BMI 37 96 kg/m²          Physical Exam  Vitals and nursing note reviewed  Constitutional:       General: She is not in acute distress  Appearance: Normal appearance  HENT:      Head: Normocephalic and atraumatic  Pulmonary:      Effort: Pulmonary effort is normal    Chest:   Breasts: Breasts are symmetrical       Right: Normal  No mass, nipple discharge, skin change, tenderness or axillary adenopathy  Left: Normal  No mass, nipple discharge, skin change, tenderness or axillary adenopathy  Abdominal:      General: There is no distension  Palpations: Abdomen is soft  Tenderness: There is no abdominal tenderness  There is no guarding or rebound  Genitourinary:     General: Normal vulva  Exam position: Lithotomy position  Labia:         Right: No rash, tenderness, lesion or injury  Left: No rash, tenderness, lesion or injury  Urethra: No prolapse, urethral pain, urethral swelling or urethral lesion  Vagina: Normal  No erythema or lesions  Cervix: No cervical motion tenderness, discharge, lesion or cervical bleeding  Uterus: Normal        Adnexa: Right adnexa normal and left adnexa normal         Right: No mass or tenderness  Left: No mass or tenderness  Rectum: No mass or external hemorrhoid  Comments: PAP from cervix  Musculoskeletal:         General: Normal range of motion  Lymphadenopathy:      Upper Body:      Right upper body: No axillary adenopathy  Left upper body: No axillary adenopathy  Lower Body: No right inguinal adenopathy  No left inguinal adenopathy  Skin:     General: Skin is warm and dry  Neurological:      Mental Status: She is alert and oriented to person, place, and time     Psychiatric:         Mood and Affect: Mood normal          Behavior: Behavior normal          Thought Content: Thought content normal          Judgment: Judgment normal

## 2023-09-09 NOTE — PROGRESS NOTES
"CC: right hip pain    48 y.o. Male presents today for follow up evaluation of his right hip pain and to review MRI results.     Attempted treatments: cane  Pain score: 9/10  History of trauma/injury: fall 1 week ago; states right leg gave out on him   Affecting ADLs: yes      REVIEW OF SYSTEMS:   Constitution: Patient denies fever or chills.  Eyes: Patient denies eye pain or vision changes.  HEENT: Patient denies ear pain, sore throat, or nasal discharge.  CVS: Patient denies chest pain.  Lungs: Patient denies shortness of breath or cough.  Skin: Patient denies skin rash or itching.    Musculoskeletal: Patient denies recent falls. See HPI.  Psych: Patient denies any current anxiety or nervousness.    PAST MEDICAL HISTORY:   Past Medical History:   Diagnosis Date    Anxiety     Depression     related to current illness    Hypertension     Seizures     Had seizures as a child       MEDICATIONS:     Current Outpatient Medications:     losartan (COZAAR) 100 MG tablet, Take 1 tablet (100 mg total) by mouth once daily., Disp: 30 tablet, Rfl: 6    ALPRAZolam (XANAX) 1 MG tablet, Take 1 tablet (1 mg total) by mouth once as needed for Anxiety. Take one tablet just prior to MRI on day of MRI. May take second tablet if needed., Disp: 2 tablet, Rfl: 0    cyclobenzaprine (FLEXERIL) 10 MG tablet, , Disp: , Rfl:     ALLERGIES:   Review of patient's allergies indicates:  No Known Allergies     IMAGIN. MRI ordered due to right hip pain, taken on 23.  2. MRI images were reviewed personally by me and then directly with patient.  3. FINDINGS: See attached media.     4. IMPRESSION: 1. Full thickness tear base of the anterior acetabular labrum of the right hip joint. 2. Low grade iliofemoral ligament sprain.     PHYSICAL EXAMINATION:  BP (!) 133/96   Pulse 97   Ht 5' 10" (1.778 m)   Wt 100.2 kg (221 lb)   BMI 31.71 kg/m²   Vitals signs and nursing note have been reviewed.    General: In no acute distress, well developed, " well nourished, no diaphoresis  Eyes: EOM full and smooth, no eye redness or discharge  HENT: normocephalic and atraumatic, neck supple, trachea midline, no nasal discharge  Cardiovascular: no LE edema  Lungs: respirations non-labored, no conversational dyspnea   Neuro: AAOx3, CN2-12 grossly intact  Skin: No rashes, warm and dry  Psychiatric: cooperative, pleasant, mood and affect appropriate for age    ASSESSMENT:      ICD-10-CM ICD-9-CM   1. Tear of right acetabular labrum, initial encounter  S73.191A 843.8   2. Pain of right hip  M25.551 719.45   3. Abnormal gait  R26.9 781.2   4. Elevated blood pressure reading  R03.0 796.2         PLAN:  MRI results reviewed with patient at today's visit.  Patient with a labral tear to his right hip.  We will start with a corticosteroid injection to the right hip joint.  Patient also be sent to formal physical therapy.  Follow-up in 6 weeks.    Risks and benefits were discussed with patient prior to receiving injection.  Depending on injection type, risks include the possibility of infection, pain, disruptions in blood pressure and blood sugar, and cosmetic deformity at site of injection.    Future planning includes - formal physical therapy    All questions were answered to the best of my ability and all concerns were addressed at this time.    Follow up in 6 week(s) for above, or sooner if needed.      This note is dictated using the M*Modal Fluency Direct word recognition program. There are word recognition mistakes that are occasionally missed on review.

## 2023-09-09 NOTE — PROCEDURES
Large Joint Aspiration/Injection: R hip joint    Date/Time: 9/9/2023 9:30 AM    Performed by: Martha Royal MD  Authorized by: Martha Royal MD    Consent Done?:  Yes (Verbal)  Indications:  Arthritis and pain  Site marked: the procedure site was marked    Timeout: prior to procedure the correct patient, procedure, and site was verified    Prep: patient was prepped and draped in usual sterile fashion      Local anesthesia used?: Yes    Anesthesia:  Local infiltration  Local anesthetic:  Co-phenylcaine spray    Details:  Needle Size:  22 G  Ultrasonic Guidance for needle placement?: Yes (Ultrasound guidance used to avoid neurovascular injury and/or to improve accuracy given body habitus.)    Images are saved and documented.  Approach:  Anterior  Location:  Hip  Site:  R hip joint  Medications:  40 mg triamcinolone acetonide 40 mg/mL  Medications comment:  Ropivacaine 0.2% 2mL  Patient tolerance:  Patient tolerated the procedure well with no immediate complications     TECHNIQUE: Real time ultrasound examination of the right femoroacetabular joint was performed with SonVoaltete Edge 2, C1-5 MHz probe(s). Ultrasound guidance was used for needle localization. Images were saved and stored for documentation. Dynamic visualization of the needle was continuous throughout the procedures and maintained in good position.

## 2023-09-13 ENCOUNTER — TELEPHONE (OUTPATIENT)
Dept: ORTHOPEDICS | Facility: CLINIC | Age: 48
End: 2023-09-13
Payer: MEDICAID

## 2023-09-13 NOTE — TELEPHONE ENCOUNTER
Returned call, advised pt that CSI can take up to 1-10 days to take effect. If pt is in severe pain, advised to report to ER immediately. Will f/u after 18th if no relief.    Mimi Snowden MS, OTC  Clinical Assistant to Dr. Martha Royal      ----- Message from Randall Adams sent at 9/13/2023 11:52 AM CDT -----  Regarding: miss call  Pt returning miss call   CALL

## 2023-09-13 NOTE — TELEPHONE ENCOUNTER
Returned call, left VM advising injection can take up to 1-10 days to take full effect. Advised that pt needs to seek medical attention at ER or Urgent care if in severe pain.    Mimi Snowden MS, OTC  Clinical Assistant to Dr. Martha Royal    ----- Message from Demarcus Velázquez sent at 9/13/2023 11:36 AM CDT -----  Regarding: Pain from injection  Contact: @ [phone]  Pt is calling in wanting to speak with the nurse in regards to the shot he received on Saturday. Pt states that she is in extreme pain from it and can barely move. Please call pt to discuss further

## 2023-09-25 ENCOUNTER — TELEPHONE (OUTPATIENT)
Dept: SPORTS MEDICINE | Facility: CLINIC | Age: 48
End: 2023-09-25
Payer: MEDICAID

## 2023-09-25 NOTE — TELEPHONE ENCOUNTER
PT referral faxed to [facility] per patient's request. Pt reports no relief from CSI. Advised to call back after 4 weeks of PT if no relief.    Mimi Snowden MS, OTC  Clinical Assistant to Dr. Martha Royal      ----- Message from Vanesa Whatley sent at 9/25/2023 11:15 AM CDT -----  Regarding: Pt advice  Contact: [phone]  Pt is calling to speak with the provider nurse about physcial therapy. Please call

## 2023-11-11 ENCOUNTER — OFFICE VISIT (OUTPATIENT)
Dept: SPORTS MEDICINE | Facility: CLINIC | Age: 48
End: 2023-11-11
Payer: MEDICAID

## 2023-11-11 VITALS — HEART RATE: 73 BPM | SYSTOLIC BLOOD PRESSURE: 128 MMHG | DIASTOLIC BLOOD PRESSURE: 97 MMHG

## 2023-11-11 DIAGNOSIS — S73.191D TEAR OF RIGHT ACETABULAR LABRUM, SUBSEQUENT ENCOUNTER: ICD-10-CM

## 2023-11-11 DIAGNOSIS — M25.551 PAIN OF RIGHT HIP: Primary | ICD-10-CM

## 2023-11-11 DIAGNOSIS — R03.0 ELEVATED BLOOD PRESSURE READING: ICD-10-CM

## 2023-11-11 DIAGNOSIS — G89.21 CHRONIC PAIN DUE TO TRAUMA: ICD-10-CM

## 2023-11-11 PROCEDURE — 99213 OFFICE O/P EST LOW 20 MIN: CPT | Mod: PBBFAC | Performed by: STUDENT IN AN ORGANIZED HEALTH CARE EDUCATION/TRAINING PROGRAM

## 2023-11-11 PROCEDURE — 1160F RVW MEDS BY RX/DR IN RCRD: CPT | Mod: CPTII,,, | Performed by: STUDENT IN AN ORGANIZED HEALTH CARE EDUCATION/TRAINING PROGRAM

## 2023-11-11 PROCEDURE — 99999 PR PBB SHADOW E&M-EST. PATIENT-LVL III: ICD-10-PCS | Mod: PBBFAC,,, | Performed by: STUDENT IN AN ORGANIZED HEALTH CARE EDUCATION/TRAINING PROGRAM

## 2023-11-11 PROCEDURE — 4010F ACE/ARB THERAPY RXD/TAKEN: CPT | Mod: CPTII,,, | Performed by: STUDENT IN AN ORGANIZED HEALTH CARE EDUCATION/TRAINING PROGRAM

## 2023-11-11 PROCEDURE — 3074F PR MOST RECENT SYSTOLIC BLOOD PRESSURE < 130 MM HG: ICD-10-PCS | Mod: CPTII,,, | Performed by: STUDENT IN AN ORGANIZED HEALTH CARE EDUCATION/TRAINING PROGRAM

## 2023-11-11 PROCEDURE — 99215 PR OFFICE/OUTPT VISIT, EST, LEVL V, 40-54 MIN: ICD-10-PCS | Mod: S$PBB,,, | Performed by: STUDENT IN AN ORGANIZED HEALTH CARE EDUCATION/TRAINING PROGRAM

## 2023-11-11 PROCEDURE — 3074F SYST BP LT 130 MM HG: CPT | Mod: CPTII,,, | Performed by: STUDENT IN AN ORGANIZED HEALTH CARE EDUCATION/TRAINING PROGRAM

## 2023-11-11 PROCEDURE — 3080F PR MOST RECENT DIASTOLIC BLOOD PRESSURE >= 90 MM HG: ICD-10-PCS | Mod: CPTII,,, | Performed by: STUDENT IN AN ORGANIZED HEALTH CARE EDUCATION/TRAINING PROGRAM

## 2023-11-11 PROCEDURE — 4010F PR ACE/ARB THEARPY RXD/TAKEN: ICD-10-PCS | Mod: CPTII,,, | Performed by: STUDENT IN AN ORGANIZED HEALTH CARE EDUCATION/TRAINING PROGRAM

## 2023-11-11 PROCEDURE — 1159F MED LIST DOCD IN RCRD: CPT | Mod: CPTII,,, | Performed by: STUDENT IN AN ORGANIZED HEALTH CARE EDUCATION/TRAINING PROGRAM

## 2023-11-11 PROCEDURE — 99999 PR PBB SHADOW E&M-EST. PATIENT-LVL III: CPT | Mod: PBBFAC,,, | Performed by: STUDENT IN AN ORGANIZED HEALTH CARE EDUCATION/TRAINING PROGRAM

## 2023-11-11 PROCEDURE — 1160F PR REVIEW ALL MEDS BY PRESCRIBER/CLIN PHARMACIST DOCUMENTED: ICD-10-PCS | Mod: CPTII,,, | Performed by: STUDENT IN AN ORGANIZED HEALTH CARE EDUCATION/TRAINING PROGRAM

## 2023-11-11 PROCEDURE — 3080F DIAST BP >= 90 MM HG: CPT | Mod: CPTII,,, | Performed by: STUDENT IN AN ORGANIZED HEALTH CARE EDUCATION/TRAINING PROGRAM

## 2023-11-11 PROCEDURE — 1159F PR MEDICATION LIST DOCUMENTED IN MEDICAL RECORD: ICD-10-PCS | Mod: CPTII,,, | Performed by: STUDENT IN AN ORGANIZED HEALTH CARE EDUCATION/TRAINING PROGRAM

## 2023-11-11 PROCEDURE — 3044F HG A1C LEVEL LT 7.0%: CPT | Mod: CPTII,,, | Performed by: STUDENT IN AN ORGANIZED HEALTH CARE EDUCATION/TRAINING PROGRAM

## 2023-11-11 PROCEDURE — 3044F PR MOST RECENT HEMOGLOBIN A1C LEVEL <7.0%: ICD-10-PCS | Mod: CPTII,,, | Performed by: STUDENT IN AN ORGANIZED HEALTH CARE EDUCATION/TRAINING PROGRAM

## 2023-11-11 PROCEDURE — 99215 OFFICE O/P EST HI 40 MIN: CPT | Mod: S$PBB,,, | Performed by: STUDENT IN AN ORGANIZED HEALTH CARE EDUCATION/TRAINING PROGRAM

## 2023-11-11 NOTE — PROGRESS NOTES
CC: right hip pain    48 y.o. Male presents today for follow up evaluation of his right hip pain following CSI. Pt reports no relief with injection. Pt reports he was unable to go to fPT the past 3 visits due to falling out of the bed (states his leg gave out). Pt reports he has not been able to sleep for 2 weeks. Pt localizes pain to anterior hip. Pt reports pain in posterior right leg. Pt reports pain is 9/10 today. Pt reports popping in his knee intermittently. Pt denies numbness/tingling.     Attempted treatments: CSI, fPT, cane  Pain score: 9/10  History of trauma/injury: fell out bed 11/2/23, right leg gave out on him  Affecting ADLs: yes      REVIEW OF SYSTEMS:   Constitution: Patient denies fever or chills.  Eyes: Patient denies eye pain or vision changes.  HEENT: Patient denies ear pain, sore throat, or nasal discharge.  CVS: Patient denies chest pain.  Lungs: Patient denies shortness of breath or cough.  Skin: Patient denies skin rash or itching.    Musculoskeletal: Patient denies recent falls. See HPI.  Psych: Patient reports anxiety.     PAST MEDICAL HISTORY:   Past Medical History:   Diagnosis Date    Anxiety     Depression     related to current illness    Hypertension     Seizures     Had seizures as a child       MEDICATIONS:     Current Outpatient Medications:     losartan (COZAAR) 100 MG tablet, TAKE 1 TABLET(100 MG) BY MOUTH EVERY DAY, Disp: 90 tablet, Rfl: 1    ALPRAZolam (XANAX) 1 MG tablet, Take 1 tablet (1 mg total) by mouth once as needed for Anxiety. Take one tablet just prior to MRI on day of MRI. May take second tablet if needed., Disp: 2 tablet, Rfl: 0    ALLERGIES:   Review of patient's allergies indicates:  No Known Allergies     PHYSICAL EXAMINATION:  BP (!) 128/97   Pulse 73   Vitals signs and nursing note have been reviewed.    General: In distress, well developed, well nourished, no diaphoresis  Eyes: EOM full and smooth, no eye redness or discharge  HENT: normocephalic and  atraumatic, neck supple, trachea midline, no nasal discharge  Cardiovascular: no LE edema  Lungs: respirations non-labored, no conversational dyspnea   Neuro: AAOx3, CN2-12 grossly intact  Skin: No rashes, warm and dry  Psychiatric: cooperative, pleasant, mood and affect appropriate for age  MSK:  severely antalgic assisted with cane    ASSESSMENT:      ICD-10-CM ICD-9-CM   1. Pain of right hip  M25.551 719.45   2. Tear of right acetabular labrum, subsequent encounter  S73.191D V58.89     843.8   3. Elevated blood pressure reading  R03.0 796.2   4. Chronic pain due to trauma  G89.21 338.21         PLAN:  Patient with no relief following hip injection.  Patient has had intra-articular hip, right SI joint, and right greater trochanteric bursa injections with no relief.  This leads me to believe that pain is probably not coming from the hip.  Patient continues to have pain down the whole right leg.  Patient had that pain prior to back surgery, back surgery did not solve his symptoms.  Patient has been seen by pain management and Neurosurgery with no relief.  At this time, given the chronicity of symptoms and the severity of symptoms, I believe it is appropriate that patient follow-up with the palliative medicine doctor for comfort care.  Lengthy discussion was had with patient regarding this referral.  Patient is likely in need of a medication regimen to help with his symptoms to restore some type of quality of life, which was not offered by pain management and neurosurgical services, thus given my palliative care referral.    Future planning includes - Palliative Care referral     All questions were answered to the best of my ability and all concerns were addressed at this time.    Follow up for above, or sooner if needed.    Time spent on the encounter includes face to face time and non-face to face time preparing to see the patient (eg, review of test), obtaining and/or reviewing separately obtained history,  documenting clinical information in the electronic or other health record, independently interpreting results (not separately reported) and communicating results to the patient/family/caregiver, or care coordination (not separately reported). I reviewed Primary care, and other specialty's notes to better coordinate patient's care. I also counseled patient  on common and most usual side effect of prescribed medications,the purppose of any new tests that may have been ordered, and possible next steps in management.  Patient voiced understanding.     EST MINUTES X   52043 5    31240 10    51757 15    46628 25    44242 40 X   NEW     43543 10    01156 20    11301 30    80973 45    39734 60    PHONE      5-10    73925 11-20    62707 21-30            This note is dictated using the M*Modal Fluency Direct word recognition program. There are word recognition mistakes that are occasionally missed on review.

## 2023-11-22 ENCOUNTER — OFFICE VISIT (OUTPATIENT)
Dept: PALLIATIVE MEDICINE | Facility: CLINIC | Age: 48
End: 2023-11-22
Payer: MEDICAID

## 2023-11-22 VITALS
BODY MASS INDEX: 31.38 KG/M2 | DIASTOLIC BLOOD PRESSURE: 89 MMHG | SYSTOLIC BLOOD PRESSURE: 144 MMHG | WEIGHT: 218.69 LBS | OXYGEN SATURATION: 96 % | HEART RATE: 98 BPM

## 2023-11-22 DIAGNOSIS — M25.551 PAIN OF RIGHT HIP: ICD-10-CM

## 2023-11-22 DIAGNOSIS — F41.9 ANXIETY: Primary | ICD-10-CM

## 2023-11-22 DIAGNOSIS — S73.191D TEAR OF RIGHT ACETABULAR LABRUM, SUBSEQUENT ENCOUNTER: ICD-10-CM

## 2023-11-22 DIAGNOSIS — G89.21 CHRONIC PAIN DUE TO TRAUMA: ICD-10-CM

## 2023-11-22 PROCEDURE — 99215 PR OFFICE/OUTPT VISIT, EST, LEVL V, 40-54 MIN: ICD-10-PCS | Mod: S$PBB,,, | Performed by: STUDENT IN AN ORGANIZED HEALTH CARE EDUCATION/TRAINING PROGRAM

## 2023-11-22 PROCEDURE — 3008F PR BODY MASS INDEX (BMI) DOCUMENTED: ICD-10-PCS | Mod: CPTII,,, | Performed by: STUDENT IN AN ORGANIZED HEALTH CARE EDUCATION/TRAINING PROGRAM

## 2023-11-22 PROCEDURE — 99999 PR PBB SHADOW E&M-EST. PATIENT-LVL III: ICD-10-PCS | Mod: PBBFAC,,, | Performed by: STUDENT IN AN ORGANIZED HEALTH CARE EDUCATION/TRAINING PROGRAM

## 2023-11-22 PROCEDURE — 99215 OFFICE O/P EST HI 40 MIN: CPT | Mod: S$PBB,,, | Performed by: STUDENT IN AN ORGANIZED HEALTH CARE EDUCATION/TRAINING PROGRAM

## 2023-11-22 PROCEDURE — 99999 PR PBB SHADOW E&M-EST. PATIENT-LVL III: CPT | Mod: PBBFAC,,, | Performed by: STUDENT IN AN ORGANIZED HEALTH CARE EDUCATION/TRAINING PROGRAM

## 2023-11-22 PROCEDURE — 3077F SYST BP >= 140 MM HG: CPT | Mod: CPTII,,, | Performed by: STUDENT IN AN ORGANIZED HEALTH CARE EDUCATION/TRAINING PROGRAM

## 2023-11-22 PROCEDURE — 99213 OFFICE O/P EST LOW 20 MIN: CPT | Mod: PBBFAC | Performed by: STUDENT IN AN ORGANIZED HEALTH CARE EDUCATION/TRAINING PROGRAM

## 2023-11-22 PROCEDURE — 3044F HG A1C LEVEL LT 7.0%: CPT | Mod: CPTII,,, | Performed by: STUDENT IN AN ORGANIZED HEALTH CARE EDUCATION/TRAINING PROGRAM

## 2023-11-22 PROCEDURE — 99499 UNLISTED E&M SERVICE: CPT | Mod: S$PBB,,, | Performed by: STUDENT IN AN ORGANIZED HEALTH CARE EDUCATION/TRAINING PROGRAM

## 2023-11-22 PROCEDURE — 3079F DIAST BP 80-89 MM HG: CPT | Mod: CPTII,,, | Performed by: STUDENT IN AN ORGANIZED HEALTH CARE EDUCATION/TRAINING PROGRAM

## 2023-11-22 PROCEDURE — 99499 NO LOS: ICD-10-PCS | Mod: S$PBB,,, | Performed by: STUDENT IN AN ORGANIZED HEALTH CARE EDUCATION/TRAINING PROGRAM

## 2023-11-22 PROCEDURE — 3044F PR MOST RECENT HEMOGLOBIN A1C LEVEL <7.0%: ICD-10-PCS | Mod: CPTII,,, | Performed by: STUDENT IN AN ORGANIZED HEALTH CARE EDUCATION/TRAINING PROGRAM

## 2023-11-22 PROCEDURE — 3008F BODY MASS INDEX DOCD: CPT | Mod: CPTII,,, | Performed by: STUDENT IN AN ORGANIZED HEALTH CARE EDUCATION/TRAINING PROGRAM

## 2023-11-22 PROCEDURE — 4010F ACE/ARB THERAPY RXD/TAKEN: CPT | Mod: CPTII,,, | Performed by: STUDENT IN AN ORGANIZED HEALTH CARE EDUCATION/TRAINING PROGRAM

## 2023-11-22 PROCEDURE — 99417 PROLNG OP E/M EACH 15 MIN: CPT | Mod: S$PBB,,, | Performed by: STUDENT IN AN ORGANIZED HEALTH CARE EDUCATION/TRAINING PROGRAM

## 2023-11-22 PROCEDURE — 99417 PR PROLONGED SVC, OUTPT, W/WO DIRECT PT CONTACT,  EA ADDTL 15 MIN: ICD-10-PCS | Mod: S$PBB,,, | Performed by: STUDENT IN AN ORGANIZED HEALTH CARE EDUCATION/TRAINING PROGRAM

## 2023-11-22 PROCEDURE — 3079F PR MOST RECENT DIASTOLIC BLOOD PRESSURE 80-89 MM HG: ICD-10-PCS | Mod: CPTII,,, | Performed by: STUDENT IN AN ORGANIZED HEALTH CARE EDUCATION/TRAINING PROGRAM

## 2023-11-22 PROCEDURE — 3077F PR MOST RECENT SYSTOLIC BLOOD PRESSURE >= 140 MM HG: ICD-10-PCS | Mod: CPTII,,, | Performed by: STUDENT IN AN ORGANIZED HEALTH CARE EDUCATION/TRAINING PROGRAM

## 2023-11-22 PROCEDURE — 4010F PR ACE/ARB THEARPY RXD/TAKEN: ICD-10-PCS | Mod: CPTII,,, | Performed by: STUDENT IN AN ORGANIZED HEALTH CARE EDUCATION/TRAINING PROGRAM

## 2023-11-22 RX ORDER — DULOXETIN HYDROCHLORIDE 30 MG/1
30 CAPSULE, DELAYED RELEASE ORAL DAILY
Qty: 30 CAPSULE | Refills: 11 | Status: SHIPPED | OUTPATIENT
Start: 2023-11-22 | End: 2024-01-22

## 2023-11-22 RX ORDER — MELOXICAM 15 MG/1
15 TABLET ORAL DAILY
Qty: 30 TABLET | Refills: 1 | Status: SHIPPED | OUTPATIENT
Start: 2023-11-22

## 2023-11-22 RX ORDER — HYDROCODONE BITARTRATE AND ACETAMINOPHEN 5; 325 MG/1; MG/1
1 TABLET ORAL
Qty: 30 TABLET | Refills: 0 | Status: SHIPPED | OUTPATIENT
Start: 2023-11-22 | End: 2023-12-21 | Stop reason: ALTCHOICE

## 2023-11-22 RX ORDER — GABAPENTIN 100 MG/1
CAPSULE ORAL
Qty: 111 CAPSULE | Refills: 0 | Status: SHIPPED | OUTPATIENT
Start: 2023-11-22 | End: 2024-01-22

## 2023-11-22 NOTE — Clinical Note
I met with Mr. Pacheco in Palliative care clinic last week. At the time of meeting me, he was not taking anything for his pain which was significantly impacting his quality of life. 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  Dr. Royal, for his right hip labral tear, I would like to refer him to orthopedics to see if arthroscopy would be indicated.  Dr. 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 Would a stimulator or pump device be an option for him?

## 2023-11-22 NOTE — PROGRESS NOTES
Palliative Medicine Clinic Note      Consult Requested By: Dr. Martha Royal    Primary Care Physician:   Tracy Melgar NP    Reason for Consult: Advance care planning and symptom management in the setting of chronic post-surgical back pain and hip pain      ASSESSMENT/PLAN:     Plan/Recommendations:  Problem List Items Addressed This Visit          Psychiatric    Anxiety - Primary       Orthopedic    Tear of right acetabular labrum    Current Assessment & Plan     Right hip pain significant component of his pain  MRI Aug 30 2023 showing 1. MRI ordered due to right hip pain, taken on 08/30/23 (report in media tab) showing Full thickness tear base of the anterior acetabular labrum of the right hip joint  Has not responded to conservative measures, will refer to hip orthopedist              Other    Chronic pain due to trauma    Current Assessment & Plan     Pain with component of nociceptive and neuropathic characteristics  Multimodal pain management with  Anti-inflammatory: start Mobic 15mg daily  Anticonvulsants: Reports previous nausea with higher dose gabapentin. Restart gabapentin at lower dose, instructed to titrate to 100mg TID  SNRI: Duloxetine 30mg daily  Discussed opioid therapy. While opioids are not first line for chronic pain, patient has failed to respond to other conservative measures. Discussed trial of low dose opioids to evaluate for improvement in functional abilities, and that this rather than pain scale would guide appropriateness to continue opioid therapy. Start Norco 5mg QHS Prn         Relevant Medications    DULoxetine (CYMBALTA) 30 MG capsule    gabapentin (NEURONTIN) 100 MG capsule    meloxicam (MOBIC) 15 MG tablet    HYDROcodone-acetaminophen (NORCO) 5-325 mg per tablet     Other Visit Diagnoses       Pain of right hip        Relevant Medications    DULoxetine (CYMBALTA) 30 MG capsule    gabapentin (NEURONTIN) 100 MG capsule    meloxicam (MOBIC) 15 MG tablet     HYDROcodone-acetaminophen (NORCO) 5-325 mg per tablet                 Advance Care Planning   Advance Directives:     Decision Making:  Patient answered questions  Goals of Care: The patient endorses that what is most important right now is to focus on remaining as independent as possible and symptom/pain control    Accordingly, we have decided that the best plan to meet the patient's goals includes continuing with treatment               Understanding of disease and Illness Trajectory: Patient  has  adequate understanding of his illness, they can benefit from continued education on what to expect in the future.      5 min time was spent on advance care planning, goals of care discussion, emotional support, formulating and communicating prognosis and goals of care, exploring burden/benefit of various approaches of treatment.        Follow up: Follow up for Virtual Visit.    Plan discussed with Dr. Royal, Dr. Lundy    SUBJECTIVE:     History obtained from: Patient, past medical records.      complaint: No chief complaint on file.        History of Present Illness / Interval History:  Junior Garay is 48 y.o. year old male presenting with chronic back and right hip pain.  Referred to Palliative Care for evaluation and management of physical symptoms. They attended the appointment with his son, Junior Garay is a previously healthy gentleman who unfortunately in Oct 2020, fell while lifting a hot water heater at work. He was evaluated repeatedly in urgent care as his symptoms did not improve with conservative measures and PT. And MRI of his spine at that time revealed a MRI of the lumbosacral spine which revealed a herniated intervertebral disc with compromise of the L4-5 nerve root on the left. CSI did not improve. Patient was evaluated by neurosurgery and underwent a Lumbar Spinal fusion, TLIF, MINIMALLY INVASIVE L4-5, L5-S1 on 06/02/2022. However, his post-op course was complicated by  persistent right hip, buttocks and anterior thigh pain. Patient with minimal relief s/p SI joint and GTB injection. He has continued to follow with physical therapy and sports medicine for evaluation of his right hip pain.     He continues to have pain which shoots down right leg, to his knee and into calves. He also has stabbing pain in his right hip/low back, particularly when laying down. His greatest concern at this time is that he has extreme difficulty sleeping. He can't sleep on right side, and currently his son has to help him get out of the bed, tie his shoes. Sits on a chair in the kitchen next to the stove to cook. He has been unable to work since his surgery, and cannot pass a CDL physical. Can't bend down to lift anything up.  He reports that now he spends much of his time just sitting at home, and is currently without an income. He is living with his son, and has 7 kids who all come together to help him out. He does not some issues with anxiety, occasionally feeling like things are closing in on him. This will happen any time he is on a long drive; has to stop and get out the car.     With regards to his medication regimen, he was previously prescribed multimodal pain therapies but stopped due to nausea/vomiting, which he attributed to the gabapentin.  However, currently he is only taking losartan for blood pressure and no other medications.       Review of Symptoms      Symptom Assessment (ESAS 0-10 Scale)  Pain:  10  Dyspnea:  0  Anxiety:  6  Nausea:  0  Depression:  0  Anorexia:  0  Fatigue:  0  Insomnia:  10  Restlessness:  0  Agitation:  2         Pain Assessment:    Location(s): back    Back       Location: bilateral and right        Quality: Shooting, stabbing and sharp        Quantity: 10/10 in intensity        Chronicity: Onset 2 year(s) ago, unchanged        Aggravating Factors: Recumbency and pressure        Alleviating Factors: None       Associated Symptoms: None    Performance Status:   70    Living Arrangements:  Lives in home and Lives with family    Psychosocial/Cultural:   See Palliative Psychosocial Note: Yes  Previously worked in shipping facility as supervisor  7 children  **Primary  to Follow**  Palliative Care  Consult: Yes          Disease History:  As per hpi    Previous experience or exposure to a serious illness: No        Medications:    Current Outpatient Medications:     losartan (COZAAR) 100 MG tablet, TAKE 1 TABLET(100 MG) BY MOUTH EVERY DAY, Disp: 90 tablet, Rfl: 1    ALPRAZolam (XANAX) 1 MG tablet, Take 1 tablet (1 mg total) by mouth once as needed for Anxiety. Take one tablet just prior to MRI on day of MRI. May take second tablet if needed., Disp: 2 tablet, Rfl: 0    DULoxetine (CYMBALTA) 30 MG capsule, Take 1 capsule (30 mg total) by mouth once daily., Disp: 30 capsule, Rfl: 11    gabapentin (NEURONTIN) 100 MG capsule, Take 1 capsule (100 mg total) by mouth every evening for 7 days, THEN 1 capsule (100 mg total) 2 (two) times daily for 7 days, THEN 1 capsule (100 mg total) 3 (three) times daily., Disp: 111 capsule, Rfl: 0    HYDROcodone-acetaminophen (NORCO) 5-325 mg per tablet, Take 1 tablet by mouth every 24 hours as needed for Pain., Disp: 30 tablet, Rfl: 0    meloxicam (MOBIC) 15 MG tablet, Take 1 tablet (15 mg total) by mouth once daily., Disp: 30 tablet, Rfl: 1      External  database queried on 11/27/2023  by Pankaj Pyle .   The results reviewed and considered with the clinical data in the decision whether or not to prescribe a controlled substance.       Past Medical History:   Diagnosis Date    Anxiety     Depression     related to current illness    Hypertension     Seizures     Had seizures as a child     Past Surgical History:   Procedure Laterality Date    ABDOMINAL SURGERY      APPENDECTOMY      BACK SURGERY      COLON SURGERY      COLONOSCOPY N/A 7/7/2023    Procedure: COLONOSCOPY;  Surgeon: Reginald Billingsley MD;  Location:  [facility] END[facility];  Service: General;  Laterality: N/A;    COLONOSCOPY N/A 10/4/2023    Procedure: COLONOSCOPY;  Surgeon: Reginald Billingsley MD;  Location: [facility] END[facility];  Service: General;  Laterality: N/A;    INJECTION, SACROILIAC JOINT Right 7/28/2022    Procedure: INJECTION,SACROILIAC JOINT RIGHT SI & RIGHT GTB;  Surgeon: Ramya Lundy MD;  Location: [facility]IN [facility]GT;  Service: Pain Management;  Laterality: Right;    MINIMALLY INVASIVE TRANSFORAMINAL LUMBAR INTERBODY FUSION (TLIF) N/A 6/2/2022    Procedure: FUSION, SPINE, LUMBAR, TLIF, MINIMALLY INVASIVE L4-5, L5-S1;  Surgeon: Humberto Rhodes MD;  Location: [facility] OR [address] [place] [address];  Service: Neurosurgery;  Laterality: N/A;  [place]ell[place] Table 4 poster, prone, neuromonitoring, globus robot, Heaven Giron     TRANSFORAMINAL EPIDURAL INJECTION OF STEROID Right 07/12/2021    Procedure: LUMBAR TRANSFORAMINAL RIGHT L4/5 DIRECT REFERRAL NEEDS CONSENT;  Surgeon: Ramya Lundy MD;  Location: [facility]IN [facility]GT;  Service: Pain Management;  Laterality: Right;     Family History   Problem Relation Age of Onset    Cancer Mother     Heart attack Mother     Colon cancer Mother     Suicide Father      Review of patient's allergies indicates:  No Known Allergies    OBJECTIVE:       Physical Exam:  Vitals: Pulse: 98 (11/22/23 0936)  BP: (!) 144/89 (11/22/23 0936)  SpO2: 96 % (11/22/23 0936)  Physical Exam  Constitutional:       General: He is not in acute distress.     Appearance: He is not diaphoretic.   HENT:      Head: Normocephalic and atraumatic.   Eyes:      General: No scleral icterus.     Conjunctiva/sclera: Conjunctivae normal.   Neck:      Thyroid: No thyromegaly.   Pulmonary:      Effort: Pulmonary effort is normal. No respiratory distress.   Abdominal:      General: There is no distension.   Musculoskeletal:         General: Tenderness present.      Lumbar back: Tenderness present.      Right lower leg: No edema.      Left lower leg: No edema.   Skin:     General: Skin is warm and dry.      Findings: No  erythema or rash.   Neurological:      Mental Status: He is alert and oriented to person, place, and time.      Gait: Gait abnormal (antalgic gait).   Psychiatric:         Mood and Affect: Affect normal. Mood is depressed.         Judgment: Judgment normal.       Labs:  CBC:   WBC   Date Value Ref Range Status   05/23/2023 3.91 3.90 - 12.70 K/uL Final       Hemoglobin   Date Value Ref Range Status   05/23/2023 15.1 14.0 - 18.0 g/dL Final       Hematocrit   Date Value Ref Range Status   05/23/2023 45.4 40.0 - 54.0 % Final       MCV   Date Value Ref Range Status   05/23/2023 94 82 - 98 fL Final       Platelets   Date Value Ref Range Status   05/23/2023 312 150 - 450 K/uL Final       Lab Results   Component Value Date    CREATININE 1.1 05/23/2023    BUN 25 (H) 05/23/2023     05/23/2023    K 3.9 05/23/2023     05/23/2023    CO2 21 (L) 05/23/2023        LFT:   Lab Results   Component Value Date    AST 29 05/23/2023    ALKPHOS 84 05/23/2023    BILITOT 0.4 05/23/2023       Albumin:   Albumin   Date Value Ref Range Status   05/23/2023 3.9 3.5 - 5.2 g/dL Final     Protein:   Total Protein   Date Value Ref Range Status   05/23/2023 7.2 6.0 - 8.4 g/dL Final         I spent a total of 75  minutes on the day of the visit. This includes face to face time in discussion of goals of care, symptom assessment, coordination of care and emotional support.  This also includes non-face to face time preparing to see the patient (eg, review of tests/imaging), obtaining and/or reviewing separately obtained history, documenting clinical information in the electronic or other health record, independently interpreting results and communicating results to the patient/family/caregiver, or care coordinator.     5 minutes spent in discussing ACP    This note was partially created using voice recognition software. Typographical and content errors may occur with this process. While efforts are made to detect and correct such errors, in  some cases errors will persist. For this reason, wording in this document should be considered in the proper context and not strictly verbatim.      Signature: Pankaj Pyle, DO

## 2023-11-27 PROBLEM — S73.191A TEAR OF RIGHT ACETABULAR LABRUM: Status: ACTIVE | Noted: 2023-11-27

## 2023-11-27 NOTE — ASSESSMENT & PLAN NOTE
Pain with component of nociceptive and neuropathic characteristics  Multimodal pain management with  Anti-inflammatory: start Mobic 15mg daily  Anticonvulsants: Reports previous nausea with higher dose gabapentin. Restart gabapentin at lower dose, instructed to titrate to 100mg TID  SNRI: Duloxetine 30mg daily  Discussed opioid therapy. While opioids are not first line for chronic pain, patient has failed to respond to other conservative measures. Discussed trial of low dose opioids to evaluate for improvement in functional abilities, and that this rather than pain scale would guide appropriateness to continue opioid therapy. Start Norco 5mg QHS Prn

## 2023-11-27 NOTE — ASSESSMENT & PLAN NOTE
Right hip pain significant component of his pain  MRI Aug 30 2023 showing 1. MRI ordered due to right hip pain, taken on 08/30/23 (report in media tab) showing Full thickness tear base of the anterior acetabular labrum of the right hip joint  Has not responded to conservative measures, will refer to hip orthopedist

## 2023-12-21 ENCOUNTER — OFFICE VISIT (OUTPATIENT)
Dept: PALLIATIVE MEDICINE | Facility: CLINIC | Age: 48
End: 2023-12-21
Payer: MEDICAID

## 2023-12-21 DIAGNOSIS — S73.191D TEAR OF RIGHT ACETABULAR LABRUM, SUBSEQUENT ENCOUNTER: Primary | ICD-10-CM

## 2023-12-21 DIAGNOSIS — F11.90 OPIOID USE: ICD-10-CM

## 2023-12-21 PROCEDURE — 99203 OFFICE O/P NEW LOW 30 MIN: CPT | Mod: 95,,, | Performed by: STUDENT IN AN ORGANIZED HEALTH CARE EDUCATION/TRAINING PROGRAM

## 2023-12-21 PROCEDURE — 3044F HG A1C LEVEL LT 7.0%: CPT | Mod: CPTII,95,, | Performed by: STUDENT IN AN ORGANIZED HEALTH CARE EDUCATION/TRAINING PROGRAM

## 2023-12-21 PROCEDURE — 4010F PR ACE/ARB THEARPY RXD/TAKEN: ICD-10-PCS | Mod: CPTII,95,, | Performed by: STUDENT IN AN ORGANIZED HEALTH CARE EDUCATION/TRAINING PROGRAM

## 2023-12-21 PROCEDURE — 3044F PR MOST RECENT HEMOGLOBIN A1C LEVEL <7.0%: ICD-10-PCS | Mod: CPTII,95,, | Performed by: STUDENT IN AN ORGANIZED HEALTH CARE EDUCATION/TRAINING PROGRAM

## 2023-12-21 PROCEDURE — 99203 PR OFFICE/OUTPT VISIT, NEW, LEVL III, 30-44 MIN: ICD-10-PCS | Mod: 95,,, | Performed by: STUDENT IN AN ORGANIZED HEALTH CARE EDUCATION/TRAINING PROGRAM

## 2023-12-21 PROCEDURE — 4010F ACE/ARB THERAPY RXD/TAKEN: CPT | Mod: CPTII,95,, | Performed by: STUDENT IN AN ORGANIZED HEALTH CARE EDUCATION/TRAINING PROGRAM

## 2023-12-21 RX ORDER — NORTRIPTYLINE HYDROCHLORIDE 10 MG/1
10 CAPSULE ORAL NIGHTLY
Qty: 30 CAPSULE | Refills: 11 | Status: SHIPPED | OUTPATIENT
Start: 2023-12-21 | End: 2024-01-22 | Stop reason: SDUPTHER

## 2023-12-21 RX ORDER — NALOXONE HYDROCHLORIDE 4 MG/.1ML
1 SPRAY NASAL ONCE
Qty: 1 EACH | Refills: 0 | Status: SHIPPED | OUTPATIENT
Start: 2023-12-21 | End: 2023-12-21

## 2023-12-21 RX ORDER — OXYCODONE HYDROCHLORIDE 10 MG/1
10 TABLET ORAL EVERY 8 HOURS PRN
Qty: 90 TABLET | Refills: 0 | Status: SHIPPED | OUTPATIENT
Start: 2023-12-21 | End: 2024-01-22 | Stop reason: SDUPTHER

## 2023-12-21 NOTE — Clinical Note
I saw Mr. Melgar for a virtual follow up today. Apparently after your visit yesterday he had a fall onto his right hip and is in significant pain. He has an appointment with orthopedics scheduled for his labral tear but it is still a few weeks out. I am okay writing pain medication for him to help him make it through to this appointment and any procedures that may be indicated. I talked with him a bit about his trouble sleeping, and he reports it is mostly pain that keeps him up. I want to avoid increasing his pain medicine while benzos are on board, so I recommended he stop taking the xanax you had prescribed.   I advised he start taking nortryptiline at bedtime, as this may help with the neuropathic compnent of his pain and may improve sleep. Starting at a lower dose since he has had nausea in the past with gabapentin/cymbalta

## 2023-12-21 NOTE — PROGRESS NOTES
Palliative Medicine Clinic Note      Consult Requested By: No ref. provider found    Primary Care Physician:   Tracy Melgar NP    Reason for Consult: Advance care planning and symptom management in the setting of chronic post-surgical back pain and hip pain    The patient location is: [place]  The chief complaint leading to consultation is: r hip pain    Visit type: audiovisual    Face to Face time with patient: 19 minutes  40 minutes of total time spent on the encounter, which includes face to face time and non-face to face time preparing to see the patient (eg, review of tests), Obtaining and/or reviewing separately obtained history, Documenting clinical information in the electronic or other health record, Independently interpreting results (not separately reported) and communicating results to the patient/family/caregiver, or Care coordination (not separately reported).       Each patient provided with medical services by telemedicine is:  (1) informed of the relationship between the physician and patient and the respective role of any other health care provider with respect to management of the patient; and (2) notified that he or she may decline to receive medical services by telemedicine and may withdraw from such care at any time.             ASSESSMENT/PLAN:     Plan/Recommendations:  Orders Placed This Encounter    oxyCODONE (ROXICODONE) 10 mg Tab immediate release tablet    nortriptyline (PAMELOR) 10 MG capsule    naloxone (NARCAN) 4 mg/actuation Spry         Hip Pain/Labral Tear  Difficulty bearing weight with recent fall  No improvement despite PT and continuing home exercises  Referred to orthopedics, first available appointment early January  Pt developed intolerable nausea when starting gabapentin/cymbalta, has stopped taking both.   START nortriptyline 10mg QHS   STOP norco  START Oxycodone 10mg q8h PRN, will prescribe 90 tablets to allow patient to make his orthopedics appointment and  establish a more definitive plan  Advised against BZD use given our plan to increase opioids  NARCAN prescribed for harm reduction  Continue Mobic 15mg daily    Insomnia  Pt reports pain as primary factor preventing adequate sleep  Advised against BZD use given our plan to increase opioids  START nortriptyline 10mg QHS      Advance Care Planning   Advance Directives:     Decision Making:  Patient answered questions  Goals of Care: What is most important right now is to focus on remaining as independent as possible, symptom/pain control. Accordingly, we have decided that the best plan to meet the patient's goals includes continuing with treatment.               Understanding of disease and Illness Trajectory: Patient  has  adequate understanding of his illness, they can benefit from continued education on what to expect in the future.      5 min time was spent on advance care planning, goals of care discussion, emotional support, formulating and communicating prognosis and goals of care, exploring burden/benefit of various approaches of treatment.        Follow up: No follow-ups on file.    Plan discussed with Dr. Melgar    SUBJECTIVE:     History obtained from: Patient, past medical records.      complaint: No chief complaint on file.        History of Present Illness / Interval History:  Junior Garay is 48 y.o. year old male presenting with chronic back and right hip pain.  Referred to Palliative Care for evaluation and management of physical symptoms. They attended the appointment with his son, Junior Resendiz     Dec 21 2023: Mr garay fell last night while trying to get out of bed and is significant pain today, rocking back and forth in the bed. He notes he can't lay on his right side at all due to pain in his right hip. He is able to lay on his back for a while until it begins hurting, then he moves to his left hip. He has been laying on a heating pad and trying to do stretches he was taught by PT  with little effect. He reports the Norco tablets he had been taking at night may have helped the pain a bit, but he is not sure. He tried taking the gabapentin and cymbalta, but again began to develop nausea and vomiting. He saw his PCP yesterday, who prescribed a 10d trial of xanax for insomnia.     11/22/23: Mr. Garay is a previously healthy gentleman who unfortunately in Oct 2020, fell while lifting a hot water heater at work. He was evaluated repeatedly in urgent care as his symptoms did not improve with conservative measures and PT. And MRI of his spine at that time revealed a MRI of the lumbosacral spine which revealed a herniated intervertebral disc with compromise of the L4-5 nerve root on the left. CSI did not improve. Patient was evaluated by neurosurgery and underwent a Lumbar Spinal fusion, TLIF, MINIMALLY INVASIVE L4-5, L5-S1 on 06/02/2022. However, his post-op course was complicated by persistent right hip, buttocks and anterior thigh pain. Patient with minimal relief s/p SI joint and GTB injection. He has continued to follow with physical therapy and sports medicine for evaluation of his right hip pain.     He continues to have pain which shoots down right leg, to his knee and into calves. He also has stabbing pain in his right hip/low back, particularly when laying down. His greatest concern at this time is that he has extreme difficulty sleeping. He can't sleep on right side, and currently his son has to help him get out of the bed, tie his shoes. Sits on a chair in the kitchen next to the stove to cook. He has been unable to work since his surgery, and cannot pass a CDL physical. Can't bend down to lift anything up.  He reports that now he spends much of his time just sitting at home, and is currently without an income. He is living with his son, and has 7 kids who all come together to help him out. He does not some issues with anxiety, occasionally feeling like things are closing in on him. This  will happen any time he is on a long drive; has to stop and get out the car.     With regards to his medication regimen, he was previously prescribed multimodal pain therapies but stopped due to nausea/vomiting, which he attributed to the gabapentin.  However, currently he is only taking losartan for blood pressure and no other medications.       Review of Symptoms      Symptom Assessment (ESAS 0-10 Scale)  Pain:  10  Dyspnea:  0  Anxiety:  6  Nausea:  0  Depression:  0  Anorexia:  0  Fatigue:  0  Insomnia:  1  Restlessness:  0  Agitation:  0     CAM / Delirium:  Negative  Constipation:  Negative  Diarrhea:  Negative      Pain Assessment:    Location(s): back    Back       Location: bilateral and right        Quality: Shooting, stabbing and sharp        Quantity: 10/10 in intensity        Chronicity: Onset 2 year(s) ago, unchanged        Aggravating Factors: Recumbency and pressure        Alleviating Factors: None       Associated Symptoms: None    Performance Status:  60    Living Arrangements:  Lives in home and Lives with family    Psychosocial/Cultural:   See Palliative Psychosocial Note: Yes  Previously worked in shipping facility as supervisor  7 children  **Primary  to Follow**  Palliative Care  Consult: Yes          Disease History:  As per hpi    Previous experience or exposure to a serious illness: No        Medications:    Current Outpatient Medications:     ALPRAZolam (XANAX) 1 MG tablet, Take 1 tablet (1 mg total) by mouth nightly as needed for Anxiety or Insomnia., Disp: 10 tablet, Rfl: 0    DULoxetine (CYMBALTA) 30 MG capsule, Take 1 capsule (30 mg total) by mouth once daily., Disp: 30 capsule, Rfl: 11    gabapentin (NEURONTIN) 100 MG capsule, Take 1 capsule (100 mg total) by mouth every evening for 7 days, THEN 1 capsule (100 mg total) 2 (two) times daily for 7 days, THEN 1 capsule (100 mg total) 3 (three) times daily., Disp: 111 capsule, Rfl: 0    HYDROcodone-acetaminophen  (NORCO) 5-325 mg per tablet, Take 1 tablet by mouth every 24 hours as needed for Pain., Disp: 30 tablet, Rfl: 0    losartan (COZAAR) 100 MG tablet, Take 1 tablet (100 mg total) by mouth once daily., Disp: 90 tablet, Rfl: 3    meloxicam (MOBIC) 15 MG tablet, Take 1 tablet (15 mg total) by mouth once daily., Disp: 30 tablet, Rfl: 1      External  database queried on 12/21/2023  by Pankaj Pyle .   The results reviewed and considered with the clinical data in the decision whether or not to prescribe a controlled substance.       Past Medical History:   Diagnosis Date    Anxiety     Depression     related to current illness    Hypertension     Seizures     Had seizures as a child     Past Surgical History:   Procedure Laterality Date    ABDOMINAL SURGERY      APPENDECTOMY      BACK SURGERY      COLON SURGERY      COLONOSCOPY N/A 7/7/2023    Procedure: COLONOSCOPY;  Surgeon: Reginald Billingsley MD;  Location: [facility] ENDO;  Service: General;  Laterality: N/A;    COLONOSCOPY N/A 10/4/2023    Procedure: COLONOSCOPY;  Surgeon: Reginald Billingsley MD;  Location: [facility];  Service: General;  Laterality: N/A;    INJECTION, SACROILIAC JOINT Right 7/28/2022    Procedure: INJECTION,SACROILIAC JOINT RIGHT SI & RIGHT GTB;  Surgeon: Ramya Lundy MD;  Location: [facility] PAIN MGT;  Service: Pain Management;  Laterality: Right;    MINIMALLY INVASIVE TRANSFORAMINAL LUMBAR INTERBODY FUSION (TLIF) N/A 6/2/2022    Procedure: FUSION, SPINE, LUMBAR, TLIF, MINIMALLY INVASIVE L4-5, L5-S1;  Surgeon: Humberto Rhodes MD;  Location: [address];  Service: Neurosurgery;  Laterality: N/A;  [place] Table 4 poster, prone, neuromonitoring, globus robot, Heaven Giron     TRANSFORAMINAL EPIDURAL INJECTION OF STEROID Right 07/12/2021    Procedure: LUMBAR TRANSFORAMINAL RIGHT L4/5 DIRECT REFERRAL NEEDS CONSENT;  Surgeon: Ramya Lundy MD;  Location: [facility] PAIN MGT;  Service: Pain Management;  Laterality: Right;     Family History   Problem Relation Age of  Onset    Cancer Mother     Heart attack Mother     Colon cancer Mother     Suicide Father      Review of patient's allergies indicates:  No Known Allergies    OBJECTIVE:       Physical Exam:  Vitals:    Physical Exam  Unable to perform full physical exam due to telemedicine visit.  Limited exam:  Gen: NAD; pleasant and engaged conversation; obvious discomfort with facial grimacing and difficulty concentrating  Non diaphoretic  Speech normal  No increased work of breathing  No cyanosis    Labs:  CBC:   WBC   Date Value Ref Range Status   12/13/2023 5.50 3.90 - 12.70 K/uL Final       Hemoglobin   Date Value Ref Range Status   12/13/2023 14.6 14.0 - 18.0 g/dL Final       Hematocrit   Date Value Ref Range Status   12/13/2023 44.0 40.0 - 54.0 % Final       MCV   Date Value Ref Range Status   12/13/2023 95 82 - 98 fL Final       Platelets   Date Value Ref Range Status   12/13/2023 338 150 - 450 K/uL Final       Lab Results   Component Value Date    CREATININE 1.1 12/13/2023    BUN 11 12/13/2023     12/13/2023    K 4.5 12/13/2023     12/13/2023    CO2 26 12/13/2023        LFT:   Lab Results   Component Value Date    AST 24 12/13/2023    ALKPHOS 85 12/13/2023    BILITOT 0.5 12/13/2023       Albumin:   Albumin   Date Value Ref Range Status   12/13/2023 3.9 3.5 - 5.2 g/dL Final     Protein:   Total Protein   Date Value Ref Range Status   12/13/2023 7.0 6.0 - 8.4 g/dL Final         I spent a total of 40 minutes on the day of the visit. This includes face to face time in discussion of goals of care, symptom assessment, coordination of care and emotional support.  This also includes non-face to face time preparing to see the patient (eg, review of tests/imaging), obtaining and/or reviewing separately obtained history, documenting clinical information in the electronic or other health record, independently interpreting results and communicating results to the patient/family/caregiver, or care coordinator.     5  minutes spent in discussing ACP    This note was partially created using voice recognition software. Typographical and content errors may occur with this process. While efforts are made to detect and correct such errors, in some cases errors will persist. For this reason, wording in this document should be considered in the proper context and not strictly verbatim.      Signature: Pankaj Pyle, DO

## 2024-01-08 ENCOUNTER — TELEPHONE (OUTPATIENT)
Dept: ORTHOPEDICS | Facility: CLINIC | Age: 49
End: 2024-01-08
Payer: MEDICARE

## 2024-01-08 NOTE — TELEPHONE ENCOUNTER
Called patient to let him know that we do not see second opinions that he would have to get back with his doctor to see who he can be referred to. Patient voiced understanding.

## 2024-01-10 ENCOUNTER — PATIENT MESSAGE (OUTPATIENT)
Dept: NEUROSURGERY | Facility: CLINIC | Age: 49
End: 2024-01-10
Payer: MEDICARE

## 2024-01-10 ENCOUNTER — PATIENT MESSAGE (OUTPATIENT)
Dept: PALLIATIVE MEDICINE | Facility: CLINIC | Age: 49
End: 2024-01-10
Payer: MEDICARE

## 2024-01-10 DIAGNOSIS — S73.191D TEAR OF RIGHT ACETABULAR LABRUM, SUBSEQUENT ENCOUNTER: Primary | ICD-10-CM

## 2024-01-11 NOTE — TELEPHONE ENCOUNTER
Patient contacted that his appointment with Orthopedics in [place] was cancelled as it was scheduled with a PA, who is unable to offer an opinon as he was already seen by Sports Medicine physician and it would be inappropriate for a PA to comment on a physician's treatment plan. Will place consult for orthopedic surgeon, likely will need to travel to [facility] to be seen by physician.

## 2024-01-17 DIAGNOSIS — Z98.1 S/P LUMBAR SPINAL FUSION: Primary | ICD-10-CM

## 2024-01-19 DIAGNOSIS — S73.191D TEAR OF RIGHT ACETABULAR LABRUM, SUBSEQUENT ENCOUNTER: Primary | ICD-10-CM

## 2024-01-19 DIAGNOSIS — M25.551 PAIN OF RIGHT HIP: ICD-10-CM

## 2024-01-22 ENCOUNTER — OFFICE VISIT (OUTPATIENT)
Dept: PALLIATIVE MEDICINE | Facility: CLINIC | Age: 49
End: 2024-01-22
Payer: MEDICARE

## 2024-01-22 DIAGNOSIS — S73.191D TEAR OF RIGHT ACETABULAR LABRUM, SUBSEQUENT ENCOUNTER: ICD-10-CM

## 2024-01-22 PROCEDURE — 99213 OFFICE O/P EST LOW 20 MIN: CPT | Mod: 95,,, | Performed by: STUDENT IN AN ORGANIZED HEALTH CARE EDUCATION/TRAINING PROGRAM

## 2024-01-22 RX ORDER — NORTRIPTYLINE HYDROCHLORIDE 25 MG/1
25 CAPSULE ORAL NIGHTLY
Qty: 30 CAPSULE | Refills: 11 | Status: SHIPPED | OUTPATIENT
Start: 2024-01-22 | End: 2024-02-23

## 2024-01-22 RX ORDER — OXYCODONE HYDROCHLORIDE 10 MG/1
10 TABLET ORAL EVERY 8 HOURS PRN
Qty: 90 TABLET | Refills: 0 | Status: SHIPPED | OUTPATIENT
Start: 2024-01-22 | End: 2024-02-23

## 2024-01-22 NOTE — PROGRESS NOTES
Palliative Medicine Clinic Note      Consult Requested By: No ref. provider found    Primary Care Physician:   Tracy Melgar NP    Reason for Consult: Advance care planning and symptom management in the setting of chronic post-surgical back pain and hip pain    The patient location is: [place]  The chief complaint leading to consultation is: r hip pain    Visit type: audiovisual    Face to Face time with patient: 19 minutes  40 minutes of total time spent on the encounter, which includes face to face time and non-face to face time preparing to see the patient (eg, review of tests), Obtaining and/or reviewing separately obtained history, Documenting clinical information in the electronic or other health record, Independently interpreting results (not separately reported) and communicating results to the patient/family/caregiver, or Care coordination (not separately reported).       Each patient provided with medical services by telemedicine is:  (1) informed of the relationship between the physician and patient and the respective role of any other health care provider with respect to management of the patient; and (2) notified that he or she may decline to receive medical services by telemedicine and may withdraw from such care at any time.             ASSESSMENT/PLAN:     Plan/Recommendations:           Hip Pain/Labral Tear  Difficulty bearing weight with recent fall  No improvement despite PT and continuing home exercises  Referred to orthopedics, first available appointment January  Follow up with Neurosurgery for lumbar pain in Jan 2024  Pt developed intolerable nausea when starting gabapentin/cymbalta, has stopped taking both.   INCREASE nortriptyline 25mg QHS   STOP norco  START Oxycodone 10mg q8h PRN, will prescribe 90 tablets to allow patient to make his orthopedics appointment and establish a more definitive plan  Advised against BZD use given our plan to increase opioids  NARCAN prescribed for harm  reduction  Continue Mobic 15mg daily    Insomnia  Pt reports pain as primary factor preventing adequate sleep  Advised against BZD use given our plan to increase opioids  Increase nortriptyline 25mg QHS    Constipation  CAPC PRN constipation escalation pathway provided and explained to patient     Advance Care Planning   Advance Directives:     Decision Making:  Patient answered questions  Goals of Care: What is most important right now is to focus on remaining as independent as possible, symptom/pain control. Accordingly, we have decided that the best plan to meet the patient's goals includes continuing with treatment.               Understanding of disease and Illness Trajectory: Patient  has  adequate understanding of his illness, they can benefit from continued education on what to expect in the future.      5 min time was spent on advance care planning, goals of care discussion, emotional support, formulating and communicating prognosis and goals of care, exploring burden/benefit of various approaches of treatment.        Follow up: No follow-ups on file.    Plan discussed with Dr. Melgar    SUBJECTIVE:     History obtained from: Patient, past medical records.      complaint: No chief complaint on file.        History of Present Illness / Interval History:  Junior Garay is 48 y.o. year old male presenting with chronic back and right hip pain.  Referred to Palliative Care for evaluation and management of physical symptoms. They attended the appointment with his son, Junior Quiñones.     01/22/2024 Pain and insomnia a bit improved. He and his son have been going for walks, trying to get some more exercise.     Dec 21 2023: Mr garay fell last night while trying to get out of bed and is significant pain today, rocking back and forth in the bed. He notes he can't lay on his right side at all due to pain in his right hip. He is able to lay on his back for a while until it begins hurting, then he moves to  his left hip. He has been laying on a heating pad and trying to do stretches he was taught by PT with little effect. He reports the Norco tablets he had been taking at night may have helped the pain a bit, but he is not sure. He tried taking the gabapentin and cymbalta, but again began to develop nausea and vomiting. He saw his PCP yesterday, who prescribed a 10d trial of xanax for insomnia.     11/22/23: Mr. Garay is a previously healthy gentleman who unfortunately in Oct 2020, fell while lifting a hot water heater at work. He was evaluated repeatedly in urgent care as his symptoms did not improve with conservative measures and PT. And MRI of his spine at that time revealed a MRI of the lumbosacral spine which revealed a herniated intervertebral disc with compromise of the L4-5 nerve root on the left. CSI did not improve. Patient was evaluated by neurosurgery and underwent a Lumbar Spinal fusion, TLIF, MINIMALLY INVASIVE L4-5, L5-S1 on 06/02/2022. However, his post-op course was complicated by persistent right hip, buttocks and anterior thigh pain. Patient with minimal relief s/p SI joint and GTB injection. He has continued to follow with physical therapy and sports medicine for evaluation of his right hip pain.     He continues to have pain which shoots down right leg, to his knee and into calves. He also has stabbing pain in his right hip/low back, particularly when laying down. His greatest concern at this time is that he has extreme difficulty sleeping. He can't sleep on right side, and currently his son has to help him get out of the bed, tie his shoes. Sits on a chair in the kitchen next to the stove to cook. He has been unable to work since his surgery, and cannot pass a CDL physical. Can't bend down to lift anything up.  He reports that now he spends much of his time just sitting at home, and is currently without an income. He is living with his son, and has 7 kids who all come together to help him out.  He does not some issues with anxiety, occasionally feeling like things are closing in on him. This will happen any time he is on a long drive; has to stop and get out the car.     With regards to his medication regimen, he was previously prescribed multimodal pain therapies but stopped due to nausea/vomiting, which he attributed to the gabapentin.  However, currently he is only taking losartan for blood pressure and no other medications.       Review of Symptoms      Symptom Assessment (ESAS 0-10 Scale)  Pain:  0  Dyspnea:  0  Anxiety:  0  Nausea:  0  Depression:  0  Anorexia:  0  Fatigue:  0  Insomnia:  0  Restlessness:  0  Agitation:  0     CAM / Delirium:  Negative  Constipation:  Negative  Diarrhea:  Negative      Pain Assessment:    Location(s): back    Back       Location: bilateral and right        Quality: Shooting, stabbing and sharp        Quantity: 10/10 in intensity        Chronicity: Onset 2 year(s) ago, unchanged        Aggravating Factors: Recumbency and pressure        Alleviating Factors: None       Associated Symptoms: None    Performance Status:  60    Living Arrangements:  Lives in home and Lives with family    Psychosocial/Cultural:   See Palliative Psychosocial Note: Yes  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  **Primary  to Follow**  Palliative Care  Consult: Yes          Disease History:  As per hpi    Previous experience or exposure to a serious illness: No        Medications:    Current Outpatient Medications:     DULoxetine (CYMBALTA) 30 MG capsule, Take 1 capsule (30 mg total) by mouth once daily., Disp: 30 capsule, Rfl: 11    losartan (COZAAR) 100 MG tablet, Take 1 tablet (100 mg total) by mouth once daily., Disp: 90 tablet, Rfl: 3    meloxicam (MOBIC) 15 MG tablet, Take 1 tablet (15 mg total) by mouth once daily., Disp: 30 tablet, Rfl: 1    nortriptyline (PAMELOR) 10 MG capsule, Take 1 capsule (10 mg total) by mouth every evening.,  Disp: 30 capsule, Rfl: 11    oxyCODONE (ROXICODONE) 10 mg Tab immediate release tablet, Take 1 tablet (10 mg total) by mouth every 8 (eight) hours as needed for Pain., Disp: 90 tablet, Rfl: 0    gabapentin (NEURONTIN) 100 MG capsule, Take 1 capsule (100 mg total) by mouth every evening for 7 days, THEN 1 capsule (100 mg total) 2 (two) times daily for 7 days, THEN 1 capsule (100 mg total) 3 (three) times daily., Disp: 111 capsule, Rfl: 0      External  database queried on 01/22/2024  by Pankaj Pyle .   The results reviewed and considered with the clinical data in the decision whether or not to prescribe a controlled substance.       Past Medical History:   Diagnosis Date    Anxiety     Depression     related to current illness    Hypertension     Seizures     Had seizures as a child     Past Surgical History:   Procedure Laterality Date    ABDOMINAL SURGERY      APPENDECTOMY      BACK SURGERY      COLON SURGERY      COLONOSCOPY N/A 7/7/2023    Procedure: COLONOSCOPY;  Surgeon: Reginald Billingsley MD;  Location: [facility];  Service: General;  Laterality: N/A;    COLONOSCOPY N/A 10/4/2023    Procedure: COLONOSCOPY;  Surgeon: Reginald Billingsley MD;  Location: [facility];  Service: General;  Laterality: N/A;    INJECTION, SACROILIAC JOINT Right 7/28/2022    Procedure: INJECTION,SACROILIAC JOINT RIGHT SI & RIGHT GTB;  Surgeon: Ramya Lundy MD;  Location: [facility];  Service: Pain Management;  Laterality: Right;    MINIMALLY INVASIVE TRANSFORAMINAL LUMBAR INTERBODY FUSION (TLIF) N/A 6/2/2022    Procedure: FUSION, SPINE, LUMBAR, TLIF, MINIMALLY INVASIVE L4-5, L5-S1;  Surgeon: Humberto Rhodes MD;  Location: [address];  Service: Neurosurgery;  Laterality: N/A;  Morris Table 4 poster, prone, neuromonitoring, globus robot, Heaven Giron     TRANSFORAMINAL EPIDURAL INJECTION OF STEROID Right 07/12/2021    Procedure: LUMBAR TRANSFORAMINAL RIGHT L4/5 DIRECT REFERRAL NEEDS CONSENT;  Surgeon: Ramya Lundy MD;  Location:  [facility];  Service: Pain Management;  Laterality: Right;     Family History   Problem Relation Age of Onset    Cancer Mother     Heart attack Mother     Colon cancer Mother     Suicide Father      Review of patient's allergies indicates:  No Known Allergies    OBJECTIVE:       Physical Exam:  Vitals:    Physical Exam  Unable to perform full physical exam due to telemedicine visit.  Limited exam:  Gen: NAD; pleasant and engaged conversation; obvious discomfort with facial grimacing and difficulty concentrating  Non diaphoretic  Speech normal  No increased work of breathing  No cyanosis    Labs:  CBC:   WBC   Date Value Ref Range Status   12/13/2023 5.50 3.90 - 12.70 K/uL Final       Hemoglobin   Date Value Ref Range Status   12/13/2023 14.6 14.0 - 18.0 g/dL Final       Hematocrit   Date Value Ref Range Status   12/13/2023 44.0 40.0 - 54.0 % Final       MCV   Date Value Ref Range Status   12/13/2023 95 82 - 98 fL Final       Platelets   Date Value Ref Range Status   12/13/2023 338 150 - 450 K/uL Final       Lab Results   Component Value Date    CREATININE 1.1 12/13/2023    BUN 11 12/13/2023     12/13/2023    K 4.5 12/13/2023     12/13/2023    CO2 26 12/13/2023        LFT:   Lab Results   Component Value Date    AST 24 12/13/2023    ALKPHOS 85 12/13/2023    BILITOT 0.5 12/13/2023       Albumin:   Albumin   Date Value Ref Range Status   12/13/2023 3.9 3.5 - 5.2 g/dL Final     Protein:   Total Protein   Date Value Ref Range Status   12/13/2023 7.0 6.0 - 8.4 g/dL Final         I spent a total of 25 minutes on the day of the visit. This includes face to face time in discussion of goals of care, symptom assessment, coordination of care and emotional support.  This also includes non-face to face time preparing to see the patient (eg, review of tests/imaging), obtaining and/or reviewing separately obtained history, documenting clinical information in the electronic or other health record, independently  interpreting results and communicating results to the patient/family/caregiver, or care coordinator.     5 minutes spent in discussing ACP    This note was partially created using voice recognition software. Typographical and content errors may occur with this process. While efforts are made to detect and correct such errors, in some cases errors will persist. For this reason, wording in this document should be considered in the proper context and not strictly verbatim.      Signature: Pankaj Pyle, DO

## 2024-01-24 ENCOUNTER — OFFICE VISIT (OUTPATIENT)
Dept: ORTHOPEDICS | Facility: CLINIC | Age: 49
End: 2024-01-24
Payer: MEDICARE

## 2024-01-24 ENCOUNTER — HOSPITAL ENCOUNTER (OUTPATIENT)
Dept: RADIOLOGY | Facility: HOSPITAL | Age: 49
Discharge: HOME | End: 2024-01-24
Attending: NURSE PRACTITIONER
Payer: MEDICARE

## 2024-01-24 DIAGNOSIS — S73.191D TEAR OF RIGHT ACETABULAR LABRUM, SUBSEQUENT ENCOUNTER: ICD-10-CM

## 2024-01-24 DIAGNOSIS — M25.551 PAIN OF RIGHT HIP: Primary | ICD-10-CM

## 2024-01-24 DIAGNOSIS — M25.551 PAIN OF RIGHT HIP: ICD-10-CM

## 2024-01-24 DIAGNOSIS — M16.11 PRIMARY OSTEOARTHRITIS OF RIGHT HIP: ICD-10-CM

## 2024-01-24 PROCEDURE — 99204 OFFICE O/P NEW MOD 45 MIN: CPT | Mod: S$PBB,,, | Performed by: NURSE PRACTITIONER

## 2024-01-24 PROCEDURE — 99999 PR PBB SHADOW E&M-EST. PATIENT-LVL III: CPT | Mod: PBBFAC,,, | Performed by: NURSE PRACTITIONER

## 2024-01-24 PROCEDURE — 73502 X-RAY EXAM HIP UNI 2-3 VIEWS: CPT | Mod: TC,RT

## 2024-01-24 PROCEDURE — 99213 OFFICE O/P EST LOW 20 MIN: CPT | Mod: PBBFAC | Performed by: NURSE PRACTITIONER

## 2024-01-24 NOTE — PROGRESS NOTES
NEW PATIENT ORTHOPAEDIC: RT HIP Pain    PRIMARY CARE PHYSICIAN: YOJANA Melgar NP  REFERRING PROVIDER: Edenilson BRYAN DO    ASSESSMENT:    Impression:  Chronic right Hip pain with history of a tear of the Acetabular labrum.   Chronic lumbar pain with a history of lumbar fusion.    Plan:   Radiographs are negative but previous MRI consistent with a tear of the Acetabular labrum. He has failed all conservative treatment modalities thus far that include: physical therapy, rest, intra-articular steroids, ambulatory aids and pain medication. He is presently being seen by palliative medicine. Will make referral to Orthopedic surgery to discuss if he is a candidate for hip replacement. I informed him that the hip may not be his main pain generator but has not made any improvement with conservative means.  Continue use of walker and previous medication.   Follow up with neurosurgery for back pain as previous.   RTC prn.     The total face-to-face encounter time with this patient was 35 mins and greater than 50% of of the encounter time was spent counseling the patient, coordinating care, and education regarding the pathology and natural history of his diagnosis. We have discussed a variety of treatment options. He will be referred to orthopedic surgery for further evaluation.     I made the decision to obtain old records of the patient including previous notes and imaging. New imaging was ordered today of the extremity evaluated. I independently reviewed and interpreted the radiographs and previous MRI today as well as prior imaging.    SUBJECTIVE    CHIEF COMPLAINT: RT Hip Pain, chronic    HPI:   Junior Garay is a 48 y.o. male here for evaluation and management of chronic right hip pain. He is referred by RANDI Pyle DO. He has a history of chronic lower back pain. He reports onset of pain started 3 years ago after he injured his back and hip at work in Oct 2020. He reports having lumbar fusion done in 2022. He states  that he has had persistent hip, groin and thigh pain for the past few years. He states that he was seeing sports medicine at [place] and was referred to Pallative medicine due to worsening symptoms. He had a previous MRI, consistent with a tear of his right acetabular labrum. He states that he has had physical therapy and intra-articular injections done with minimal relief. He presents on a cane and is noted with a unsteady gait. He states that he saw pain management recently and was placed on Oxycodone for pain relief.     Currently the pain in the joint is rated at 9 out of 10 with light activity.  The pain is constant and is located in the Right buttocks, hip, groin, outer aspect of the hip and thigh. The pain is described as severe and sharp. Relieving factors include ambulatory device, rest, ice, prescription medication and repositioning.     PROGRESSIVE SYMPTOMS:  Pain impacting sleep   Pain impacting ADLs  Pain worsened by weight bearing  Pain effecting living situation  Pain limiting ability to stay fit and healthy  Unable to ambulate 2 blocks without significant pain and dysfunction.    FUNCTIONAL STATUS:   Do light work around the house  Unable to Walk a block or two on level ground   Unable to Climb a flight of stairs or walk up a hill   Limited most or all of the time (uses cane)    PREVIOUS TREATMENTS:  Intra-articular injections  Medical: Mobic, pain medication  Physical Therapy: Use of Ambulatory Aid and physical therapy      REVIEW OF SYSTEMS:  PAIN ASSESSMENT:  See HPI.  MUSCULOSKELETAL: See HPI.      PAST MEDICAL HISTORY  Anxiety, depression, HTN, seizures    PAST SURGICAL HISTORY  Appendectomy, Lumbar surgery, SI joint injection    SOCIAL HISTORY  Former smoker    ALLERGIES:   NKDA    MEDICATIONS:   See list      PHYSICAL EXAM     All other systems deferred.  GENERAL:  No acute distress  HABITUS: Normal  GAIT: Antalgic-severe  SKIN: intact    HIP EXAM:  Right:   ROM:   Flexion: decreased,  guarded   Internal Rotation: decreased, guarded   External Rotation: decreased, guarded        Strength: 4/5 hip flexion, abduction, knee flexion and extension   Palpation: Mild tenderness over greater trochanter and IT band.   Log roll: painful.   Pain is reproduced in the groin with IR or ER of the hip  No apparent leg length discrepancy   Straight leg raise: Negative   Neurovascular Status: Sensation intact to light touch in Sural, saphenous, SPN, DPN, Tibial nerve distribution  2+ pulse DP/PT, normal capillary refill, foot has normal warmth.    DATA:  Diagnostic tests reviewed for today's visit:   RT Hip 2 V ordered and reviewed:   Right ilioischial and iliopectineal lines are intact.  Right femoroacetabular joint is well maintained.  Partially imaged lumbosacral hardware.  No acute fracture or dislocation detected.     MRI: MR images of the right hip reviewed from 08/30/23:   Full thickness tear base of the anterior acetabular labrum of the right hip joint.   Low grade iliofemoral ligament sprain.

## 2024-01-30 ENCOUNTER — HOSPITAL ENCOUNTER (OUTPATIENT)
Dept: RADIOLOGY | Facility: HOSPITAL | Age: 49
Discharge: HOME | End: 2024-01-30
Attending: NURSE PRACTITIONER
Payer: MEDICARE

## 2024-01-30 ENCOUNTER — OFFICE VISIT (OUTPATIENT)
Dept: NEUROSURGERY | Facility: CLINIC | Age: 49
End: 2024-01-30
Payer: MEDICARE

## 2024-01-30 VITALS
DIASTOLIC BLOOD PRESSURE: 99 MMHG | SYSTOLIC BLOOD PRESSURE: 143 MMHG | WEIGHT: 216.63 LBS | BODY MASS INDEX: 31.08 KG/M2 | TEMPERATURE: 98 F

## 2024-01-30 DIAGNOSIS — M51.36 DDD (DEGENERATIVE DISC DISEASE), LUMBAR: ICD-10-CM

## 2024-01-30 DIAGNOSIS — Z98.1 S/P LUMBAR SPINAL FUSION: ICD-10-CM

## 2024-01-30 DIAGNOSIS — M48.07 SPINAL STENOSIS, LUMBOSACRAL REGION: Primary | ICD-10-CM

## 2024-01-30 DIAGNOSIS — M54.9 DORSALGIA, UNSPECIFIED: ICD-10-CM

## 2024-01-30 PROCEDURE — 99214 OFFICE O/P EST MOD 30 MIN: CPT | Mod: PBBFAC | Performed by: NURSE PRACTITIONER

## 2024-01-30 PROCEDURE — 99213 OFFICE O/P EST LOW 20 MIN: CPT | Mod: S$PBB,,, | Performed by: NURSE PRACTITIONER

## 2024-01-30 PROCEDURE — 99999 PR PBB SHADOW E&M-EST. PATIENT-LVL IV: CPT | Mod: PBBFAC,,, | Performed by: NURSE PRACTITIONER

## 2024-01-30 PROCEDURE — 72082 X-RAY EXAM ENTIRE SPI 2/3 VW: CPT | Mod: TC

## 2024-01-30 PROCEDURE — 72082 X-RAY EXAM ENTIRE SPI 2/3 VW: CPT | Mod: 26,,, | Performed by: STUDENT IN AN ORGANIZED HEALTH CARE EDUCATION/TRAINING PROGRAM

## 2024-01-30 PROCEDURE — 72110 X-RAY EXAM L-2 SPINE 4/>VWS: CPT | Mod: 59,TC

## 2024-01-30 PROCEDURE — 72110 X-RAY EXAM L-2 SPINE 4/>VWS: CPT | Mod: 26,,, | Performed by: RADIOLOGY

## 2024-01-30 NOTE — PROGRESS NOTES
Neurosurgery  Established Patient    SUBJECTIVE:     History of Present Illness (per Catalina Wolff PA-C 9/27/22): 47 year old male s/p L4-5, L5-S1 TLIF on 6/2/22 who presents for follow up. He has had a complex post-operative course with continued right hip and buttocks pain. He was last evaluated in clinic by Tre Belcher PA-C 9/13/22 who ordered an updated CT lumbar spine to evaluate fusion 3 months post-op as well as to further evaluate his pain. He previously had SI joint and GTB injections without much relief. Today, he reports his back pain is improving but continues with right hip pain, right buttocks pain as well as RLE pain and paraesthesias that travel anteriorly down the leg to his knee.  Patient states that leg symptoms have been ongoing since surgery. Denies symptoms in LLE.  Denies new weakness, bowel/bladder dysfunction. Xrays of his right hip were obtained in the hospital post-op which were unrevealing.      Interval Hx 1/30/24: The patient is being seen in clinic today as recommended by orthopedics and palliative medicine to re-evaluate the patient's low back and right buttock pain. Orthopedics is considering a right hip replacement given the history of a tear of the Acetabular labrum. Palliative medicine has started the patient on Oxycodone 10mg q8h PRN, continue Mobic, and INCREASE nortriptyline 25mg QHS to assist with his pain until the hip replacement. No improvement despite PT and continuing home exercises. Today, he describes the pain as constant and severe on the right side of the low back radiating into the right buttock and hip into the thigh. Rates the pain as a 10/10. Aggravating factors include standing and walking with a cane. Alleviating factors include sitting, laying, and medication. Denies b/b dysfunction, saddle anesthesia, or gait instability. Endorses pain limited weakness in the leg associated with tingling.     Review of patient's allergies indicates:  No Known  Allergies    Current Outpatient Medications   Medication Sig Dispense Refill    losartan (COZAAR) 100 MG tablet Take 1 tablet (100 mg total) by mouth once daily. 90 tablet 3    meloxicam (MOBIC) 15 MG tablet Take 1 tablet (15 mg total) by mouth once daily. 30 tablet 1    nortriptyline (PAMELOR) 25 MG capsule Take 1 capsule (25 mg total) by mouth every evening. 30 capsule 11    oxyCODONE (ROXICODONE) 10 mg Tab immediate release tablet Take 1 tablet (10 mg total) by mouth every 8 (eight) hours as needed for Pain. 90 tablet 0     No current facility-administered medications for this visit.       Past Medical History:   Diagnosis Date    Anxiety     Depression     related to current illness    Hypertension     Seizures     Had seizures as a child     Past Surgical History:   Procedure Laterality Date    ABDOMINAL SURGERY      APPENDECTOMY      BACK SURGERY      COLON SURGERY      COLONOSCOPY N/A 7/7/2023    Procedure: COLONOSCOPY;  Surgeon: Reginald Billingsley MD;  Location: [facility] ENDO;  Service: General;  Laterality: N/A;    COLONOSCOPY N/A 10/4/2023    Procedure: COLONOSCOPY;  Surgeon: Reginald Billingsley MD;  Location: [facility];  Service: General;  Laterality: N/A;    INJECTION, SACROILIAC JOINT Right 7/28/2022    Procedure: INJECTION,SACROILIAC JOINT RIGHT SI & RIGHT GTB;  Surgeon: Ramya Lundy MD;  [place] [facility] [place];  Service: Pain Management;  Laterality: Right;    MINIMALLY INVASIVE TRANSFORAMINAL LUMBAR INTERBODY FUSION (TLIF) N/A 6/2/2022    Procedure: FUSION, SPINE, LUMBAR, TLIF, MINIMALLY INVASIVE L4-5, L5-S1;  Surgeon: Humberto Rhodes MD;  Location: [facility] OR [address] [place] [address];  Service: Neurosurgery;  Laterality: N/A;  [place] Table 4 poster, prone, neuromonitoring, globus robot, Heaven Giron     TRANSFORAMINAL EPIDURAL INJECTION OF STEROID Right 07/12/2021    Procedure: LUMBAR TRANSFORAMINAL RIGHT L4/5 DIRECT REFERRAL NEEDS CONSENT;  Surgeon: Ramya Lundy MD;  Location: [facility] [place];  Service: Pain Management;   Laterality: Right;     Family History       Problem Relation (Age of Onset)    Cancer Mother    Colon cancer Mother    Heart attack Mother    Suicide Father          Social History     Socioeconomic History    Marital status:     Number of children: 7   Tobacco Use    Smoking status: Former     Current packs/day: 0.50     Average packs/day: 0.5 packs/day for 10.0 years (5.0 ttl pk-yrs)     Types: Cigarettes    Smokeless tobacco: Never    Tobacco comments:     Stopped 2 weeks ago   Substance and Sexual Activity    Alcohol use: No     Comment: socially- wedding or party    Drug use: No    Sexual activity: Not Currently     Partners: Female   Social History Narrative    Stairs- none     Social Determinants of Health     Financial Resource Strain: Medium Risk (11/15/2023)    Overall Financial Resource Strain (CARDIA)     Difficulty of Paying Living Expenses: Somewhat hard   Food Insecurity: Food Insecurity Present (11/15/2023)    Hunger Vital Sign     Worried About Running Out of Food in the Last Year: Sometimes true     Ran Out of Food in the Last Year: Sometimes true   Transportation Needs: Unmet Transportation Needs (11/15/2023)    PRAPARE - Transportation     Lack of Transportation (Medical): Yes     Lack of Transportation (Non-Medical): Yes   Physical Activity: Insufficiently Active (11/15/2023)    Exercise Vital Sign     Days of Exercise per Week: 2 days     Minutes of Exercise per Session: 10 min   Stress: Stress Concern Present (11/15/2023)    [employer] [place] [place] [employer] - Occupational Stress Questionnaire     Feeling of Stress : Very much   Social Connections: Unknown (11/15/2023)    Social Connection and Isolation Panel [NHANES]     Frequency of Communication with Friends and Family: More than three times a week     Frequency of Social Gatherings with Friends and Family: Once a week     Active Member of Clubs or Organizations: No     Attends Club or Organization Meetings: Never     Marital  Status:    Housing Stability: High Risk (11/15/2023)    Housing Stability Vital Sign     Unable to Pay for Housing in the Last Year: Yes     Number of Places Lived in the Last Year: 1     Unstable Housing in the Last Year: No       Review of Systems    OBJECTIVE:     Vital Signs     There is no height or weight on file to calculate BMI.    Neurosurgery Physical Exam  General: well developed, well nourished, no distress.   Head: normocephalic, atraumatic  Neurologic: Alert and oriented. Thought content appropriate.  GCS: Motor: 6/Verbal: 5/Eyes: 4 GCS Total: 15  Mental Status: Awake, Alert, Oriented x 4  Language: No aphasia  Speech: No dysarthria  Cranial nerves: face symmetric, tongue midline, CN II-XII grossly intact.   Eyes: pupils equal, round, reactive to light with accomodation, EOMI.   Pulmonary: normal respirations, no signs of respiratory distress  Abdomen: soft, non-distended  Skin: Skin is warm, dry and intact.  Sensory: intact to light touch throughout  Motor Strength:Moves all extremities spontaneously with good tone.   Strength Exam Pain Limited  Strength  Deltoids Triceps Biceps Wrist Extension Wrist Flexion Hand    Upper: R 5/5 5/5 5/5 5/5 5/5 5/5    L 5/5 5/5 5/5 5/5 5/5 5/5     HF KE KF DF PF EHL   Lower: R 4-/5 4/5 4/5 4/5 4/5 4/5    L 5/5 5/5 5/5 5/5 5/5 5/5        Cerebellar:   Gait antalgic with use of cane     Cervical:   ROM: Full with flexion, extension, lateral rotation and ear-to-shoulder bend.   Midline TTP: Negative.     Thoracic:  Midline TTP: Negative.     Lumbar:  Midline TTP: Positive  Straight Leg Test: Positive RIGHT.    Other:  SI joint TTP: Positive RIGHT.  Greater trochanter TTP: Positive.  Tenderness with external/internal hip rotation: Positive.    Diagnostic Results:  I have personally reviewed the imaging dated 1/30/24:    X-ray lumbar flex/ex shows PSF L4-pelvis with hardware in stable position. No instability.    2.   Scoliosis shows no significant scoliosis.      ASSESSMENT/PLAN:   Junior Garay is a 48 y.o. male s/p L4-5, L5-S1 TLIF on 6/2/22 who was seen today as recommended by orthopedics and palliative medicine to re-evaluate the patient's low back and right buttock/hip pain.We discussed that his symptoms are likely due to his hip pathology, but will updated his lumbar imaging to ensure no acute abnormalities. The patient was agreeable.  I will notify the patient of the findings and next steps. I have encouraged him to contact the clinic with any questions, concerns, or adverse clinical changes. He verbalized understanding.      TERE Garcia-SANAM  Neurosurgery  [facility] [place].      Note dictated with voice recognition software, please excuse any grammatical errors.

## 2024-02-06 DIAGNOSIS — S73.191D TEAR OF RIGHT ACETABULAR LABRUM, SUBSEQUENT ENCOUNTER: Primary | ICD-10-CM

## 2024-02-06 DIAGNOSIS — M25.551 PAIN OF RIGHT HIP: ICD-10-CM

## 2024-02-06 DIAGNOSIS — M16.11 PRIMARY OSTEOARTHRITIS OF RIGHT HIP: ICD-10-CM

## 2024-02-21 ENCOUNTER — TELEPHONE (OUTPATIENT)
Dept: PALLIATIVE MEDICINE | Facility: CLINIC | Age: 49
End: 2024-02-21
Payer: MEDICARE

## 2024-02-23 ENCOUNTER — OFFICE VISIT (OUTPATIENT)
Dept: PALLIATIVE MEDICINE | Facility: CLINIC | Age: 49
End: 2024-02-23
Payer: MEDICARE

## 2024-02-23 ENCOUNTER — PATIENT MESSAGE (OUTPATIENT)
Dept: PALLIATIVE MEDICINE | Facility: CLINIC | Age: 49
End: 2024-02-23

## 2024-02-23 DIAGNOSIS — S73.191D TEAR OF RIGHT ACETABULAR LABRUM, SUBSEQUENT ENCOUNTER: ICD-10-CM

## 2024-02-23 PROCEDURE — 99215 OFFICE O/P EST HI 40 MIN: CPT | Mod: 95,,, | Performed by: STUDENT IN AN ORGANIZED HEALTH CARE EDUCATION/TRAINING PROGRAM

## 2024-02-23 RX ORDER — OXYCODONE HYDROCHLORIDE 10 MG/1
10 TABLET ORAL EVERY 6 HOURS PRN
Qty: 150 TABLET | Refills: 0 | Status: SHIPPED | OUTPATIENT
Start: 2024-02-23 | End: 2024-04-23 | Stop reason: SDUPTHER

## 2024-02-23 RX ORDER — NORTRIPTYLINE HYDROCHLORIDE 50 MG/1
50 CAPSULE ORAL NIGHTLY
Qty: 30 CAPSULE | Refills: 11 | Status: SHIPPED | OUTPATIENT
Start: 2024-02-23 | End: 2025-02-22

## 2024-02-23 NOTE — PROGRESS NOTES
Palliative Medicine Clinic Note      Consult Requested By: No ref. provider found    Primary Care Physician:   Tracy Melgar NP    Reason for Consult: Advance care planning and symptom management in the setting of chronic post-surgical back pain and hip pain    The patient location is: [place]  The chief complaint leading to consultation is: r hip pain    Visit type: audiovisual    Face to Face time with patient: 21 minutes  40 minutes of total time spent on the encounter, which includes face to face time and non-face to face time preparing to see the patient (eg, review of tests), Obtaining and/or reviewing separately obtained history, Documenting clinical information in the electronic or other health record, Independently interpreting results (not separately reported) and communicating results to the patient/family/caregiver, or Care coordination (not separately reported).       Each patient provided with medical services by telemedicine is:  (1) informed of the relationship between the physician and patient and the respective role of any other health care provider with respect to management of the patient; and (2) notified that he or she may decline to receive medical services by telemedicine and may withdraw from such care at any time.             ASSESSMENT/PLAN:     Plan/Recommendations:  Orders Placed This Encounter    oxyCODONE (ROXICODONE) 10 mg Tab immediate release tablet    nortriptyline (PAMELOR) 50 MG capsule           Hip Pain/Labral Tear  Walking better, no recent falls  No improvement despite PT and continuing home exercises  Referred to orthopedics, pt reports he was told no surgical repair option at this time  Upcoming repeat Lumbar imaging per NSGY  Have previously discussed with Dr. Lundy with pain management, can consider for pain pump/spinal stimulator for failed back syndrome if not responding to oral therapies  Pt developed intolerable nausea when starting gabapentin/cymbalta, has  stopped taking both.   INCREASE nortriptyline to 50mg QHS   Continue Oxycodone 10mg q6h PRN  Opioid therapy has resulted in increased functional ability (tolerating occasional walks, sleeping better due to decreased pain)  Advised against BZD use given our plan to increase opioids  NARCAN prescribed for harm reduction  Continue Mobic 15mg daily    Insomnia  Pt reports pain as primary factor preventing adequate sleep, improved with prn oxycodone  Advised against BZD use given our plan to increase opioids  Increase nortriptyline 50mg QHS    Anxiety:   - Referred the patient to [facility]ESER[facility] Packer for counseling to manage anxiety.   - Explained that counseling could equip the patient with coping mechanisms and techniques to better handle his anxiety.     Constipation  CAPC PRN constipation escalation pathway provided and explained to patient     Advance Care Planning   Advance Directives:     Decision Making:  Patient answered questions  Goals of Care: What is most important right now is to focus on remaining as independent as possible, symptom/pain control. Accordingly, we have decided that the best plan to meet the patient's goals includes continuing with treatment.               Understanding of disease and Illness Trajectory: Patient  has  adequate understanding of his illness, they can benefit from continued education on what to expect in the future.      5 min time was spent on advance care planning, goals of care discussion, emotional support, formulating and communicating prognosis and goals of care, exploring burden/benefit of various approaches of treatment.        Follow up: No follow-ups on file.    Plan discussed with Dr. Melgar    SUBJECTIVE:     History obtained from: Patient, past medical records.      complaint:   Chief Complaint   Patient presents with    Follow-up           History of Present Illness / Interval History:  Junior Garay is 49 y.o. year old male presenting with chronic back and  right hip pain.  Referred to Palliative Care for evaluation and management of physical symptoms. They attended the appointment with his son, Junior Resendiz     02/23/2024: Pain and insomnia a bit improved. He and his son have been going for walks, trying to get some more exercise. History of Present Illness    CHIEF COMPLAINT:  - Hip pain  - Labral tear  - Insomnia    HPI:  Patient presents for a follow-up regarding hip pain, labral tear, and insomnia. The orthopedist informed the patient that he did not have enough arthritis for hip surgery. The patient is currently trying to set up an MRI to further investigate the issue. The patient has been taking nortriptyline and oxycodone for pain management and sleep, mentioning that 1.5 oxycodone tablets at bedtime helps with sleep by reducing pain. The patient describes the pain in the hip and leg as shooting, stabbing, and sharp. The patient has been able to go on walks with his son around their apartment complex. The patient also experiences anxiety, describing it as feeling like everything is caving in on him, with good and bad days. Patient denies any worsening of anxiety due to medication.         Dec 21 2023: Mr garay fell last night while trying to get out of bed and is significant pain today, rocking back and forth in the bed. He notes he can't lay on his right side at all due to pain in his right hip. He is able to lay on his back for a while until it begins hurting, then he moves to his left hip. He has been laying on a heating pad and trying to do stretches he was taught by PT with little effect. He reports the Norco tablets he had been taking at night may have helped the pain a bit, but he is not sure. He tried taking the gabapentin and cymbalta, but again began to develop nausea and vomiting. He saw his PCP yesterday, who prescribed a 10d trial of xanax for insomnia.     11/22/23: Mr. Garay is a previously healthy gentleman who unfortunately in Oct 2020, fell  while lifting a hot water heater at work. He was evaluated repeatedly in urgent care as his symptoms did not improve with conservative measures and PT. And MRI of his spine at that time revealed a MRI of the lumbosacral spine which revealed a herniated intervertebral disc with compromise of the L4-5 nerve root on the left. CSI did not improve. Patient was evaluated by neurosurgery and underwent a Lumbar Spinal fusion, TLIF, MINIMALLY INVASIVE L4-5, L5-S1 on 06/02/2022. However, his post-op course was complicated by persistent right hip, buttocks and anterior thigh pain. Patient with minimal relief s/p SI joint and GTB injection. He has continued to follow with physical therapy and sports medicine for evaluation of his right hip pain.     He continues to have pain which shoots down right leg, to his knee and into calves. He also has stabbing pain in his right hip/low back, particularly when laying down. His greatest concern at this time is that he has extreme difficulty sleeping. He can't sleep on right side, and currently his son has to help him get out of the bed, tie his shoes. Sits on a chair in the kitchen next to the stove to cook. He has been unable to work since his surgery, and cannot pass a CDL physical. Can't bend down to lift anything up.  He reports that now he spends much of his time just sitting at home, and is currently without an income. He is living with his son, and has 7 kids who all come together to help him out. He does not some issues with anxiety, occasionally feeling like things are closing in on him. This will happen any time he is on a long drive; has to stop and get out the car.     With regards to his medication regimen, he was previously prescribed multimodal pain therapies but stopped due to nausea/vomiting, which he attributed to the gabapentin.  However, currently he is only taking losartan for blood pressure and no other medications.       Review of Symptoms      Symptom Assessment  (ESAS 0-10 Scale)  Pain:  8  Dyspnea:  0  Anxiety:  9  Nausea:  0  Depression:  3  Anorexia:  0  Fatigue:  0  Insomnia:  7  Restlessness:  3  Agitation:  0     CAM / Delirium:  Negative  Constipation:  Negative  Diarrhea:  Negative      Pain Assessment:    Location(s): back    Back       Location: bilateral and right        Quality: Shooting, stabbing and sharp        Quantity: 10/10 in intensity        Chronicity: Onset 2 year(s) ago, unchanged        Aggravating Factors: Recumbency and pressure        Alleviating Factors: None       Associated Symptoms: None    Performance Status:  60    Living Arrangements:  Lives in home and Lives with family    Psychosocial/Cultural:   See Palliative Psychosocial Note: Yes  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  7 children, 29, 29, 26, 24, 21, 18, 5 yo kids  5yo into sports and computers  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  **Primary  to Follow**  Palliative Care  Consult: Yes            Disease History:  As per hpi    Previous experience or exposure to a serious illness: No        Medications:    Current Outpatient Medications:     losartan (COZAAR) 100 MG tablet, Take 1 tablet (100 mg total) by mouth once daily., Disp: 90 tablet, Rfl: 3    meloxicam (MOBIC) 15 MG tablet, Take 1 tablet (15 mg total) by mouth once daily., Disp: 30 tablet, Rfl: 1    nortriptyline (PAMELOR) 50 MG capsule, Take 1 capsule (50 mg total) by mouth every evening., Disp: 30 capsule, Rfl: 11    oxyCODONE (ROXICODONE) 10 mg Tab immediate release tablet, Take 1 tablet (10 mg total) by mouth every 6 (six) hours as needed for Pain., Disp: 150 tablet, Rfl: 0      External  database queried on 02/23/2024  by Pankaj Pyle .   The results reviewed and considered with the clinical data in the decision whether or not to prescribe a controlled substance.       Past Medical History:   Diagnosis Date    Anxiety     Depression     related to current illness     Hypertension     Seizures     Had seizures as a child     Past Surgical History:   Procedure Laterality Date    ABDOMINAL SURGERY      APPENDECTOMY      BACK SURGERY      COLON SURGERY      COLONOSCOPY N/A 7/7/2023    Procedure: COLONOSCOPY;  Surgeon: Reginald Billingsley MD;  Location: [facility] ENDO;  Service: General;  Laterality: N/A;    COLONOSCOPY N/A 10/4/2023    Procedure: COLONOSCOPY;  Surgeon: Reginald Billingsley MD;  Location: [facility] ENDO;  Service: General;  Laterality: N/A;    INJECTION, SACROILIAC JOINT Right 7/28/2022    Procedure: INJECTION,SACROILIAC JOINT RIGHT SI & RIGHT GTB;  Surgeon: Ramya Lundy MD;  Location: [facility] PAIN MGT;  Service: Pain Management;  Laterality: Right;    MINIMALLY INVASIVE TRANSFORAMINAL LUMBAR INTERBODY FUSION (TLIF) N/A 6/2/2022    Procedure: FUSION, SPINE, LUMBAR, TLIF, MINIMALLY INVASIVE L4-5, L5-S1;  Surgeon: Humberto Rhodes MD;  Location: [facility] OR [address] [place] [address];  Service: Neurosurgery;  Laterality: N/A;  [place] Table 4 poster, prone, neuromonitoring, globus robot, Heaven Giron     TRANSFORAMINAL EPIDURAL INJECTION OF STEROID Right 07/12/2021    Procedure: LUMBAR TRANSFORAMINAL RIGHT L4/5 DIRECT REFERRAL NEEDS CONSENT;  Surgeon: Ramya Lundy MD;  Location: [facility] PAIN MGT;  Service: Pain Management;  Laterality: Right;     Family History   Problem Relation Age of Onset    Cancer Mother     Heart attack Mother     Colon cancer Mother     Suicide Father      Review of patient's allergies indicates:  No Known Allergies    OBJECTIVE:       Physical Exam:  Vitals:    Physical Exam  Unable to perform full physical exam due to telemedicine visit.  Limited exam:  Gen: NAD; pleasant and engaged conversation; obvious discomfort with facial grimacing and difficulty concentrating  Non diaphoretic  Speech normal  No increased work of breathing  No cyanosis    Labs:  CBC:   WBC   Date Value Ref Range Status   12/13/2023 5.50 3.90 - 12.70 K/uL Final       Hemoglobin   Date Value Ref Range Status   12/13/2023  14.6 14.0 - 18.0 g/dL Final       Hematocrit   Date Value Ref Range Status   12/13/2023 44.0 40.0 - 54.0 % Final       MCV   Date Value Ref Range Status   12/13/2023 95 82 - 98 fL Final       Platelets   Date Value Ref Range Status   12/13/2023 338 150 - 450 K/uL Final       Lab Results   Component Value Date    CREATININE 1.1 12/13/2023    BUN 11 12/13/2023     12/13/2023    K 4.5 12/13/2023     12/13/2023    CO2 26 12/13/2023        LFT:   Lab Results   Component Value Date    AST 24 12/13/2023    ALKPHOS 85 12/13/2023    BILITOT 0.5 12/13/2023       Albumin:   Albumin   Date Value Ref Range Status   12/13/2023 3.9 3.5 - 5.2 g/dL Final     Protein:   Total Protein   Date Value Ref Range Status   12/13/2023 7.0 6.0 - 8.4 g/dL Final         I spent a total of 40 minutes on the day of the visit. This includes face to face time in discussion of goals of care, symptom assessment, coordination of care and emotional support.  This also includes non-face to face time preparing to see the patient (eg, review of tests/imaging), obtaining and/or reviewing separately obtained history, documenting clinical information in the electronic or other health record, independently interpreting results and communicating results to the patient/family/caregiver, or care coordinator.     5 minutes spent in discussing ACP    This note was partially created using voice recognition software. Typographical and content errors may occur with this process. While efforts are made to detect and correct such errors, in some cases errors will persist. For this reason, wording in this document should be considered in the proper context and not strictly verbatim.      Signature: Pankaj Pyle DO

## 2024-03-04 ENCOUNTER — OFFICE VISIT (OUTPATIENT)
Dept: PALLIATIVE MEDICINE | Facility: CLINIC | Age: 49
End: 2024-03-04
Payer: MEDICARE

## 2024-03-04 DIAGNOSIS — F43.23 ADJUSTMENT DISORDER WITH MIXED ANXIETY AND DEPRESSED MOOD: Primary | ICD-10-CM

## 2024-03-04 PROCEDURE — 90791 PSYCH DIAGNOSTIC EVALUATION: CPT | Mod: 95,,, | Performed by: SOCIAL WORKER

## 2024-03-04 NOTE — PROGRESS NOTES
Psychiatry Initial Visit (PhD/LCSW)  Diagnostic Interview - CPT 99979    Date: 3/4/2024    The patient location is: [place], [place]   The chief complaint leading to consultation is: Anxiety, Depression, Sleep    Visit type: audiovisual    Face to Face time with patient: 57  65 minutes of total time spent on the encounter, which includes face to face time and non-face to face time preparing to see the patient (eg, review of tests), Obtaining and/or reviewing separately obtained history, Documenting clinical information in the electronic or other health record, Independently interpreting results (not separately reported) and communicating results to the patient/family/caregiver, or Care coordination (not separately reported).     Each patient to whom he or she provides medical services by telemedicine is:  (1) informed of the relationship between the physician and patient and the respective role of any other health care provider with respect to management of the patient; and (2) notified that he or she may decline to receive medical services by telemedicine and may withdraw from such care at any time.     Site: [facility]    Referral source: Pankaj Pyle, Palliative Medicine     Clinical status of patient: Outpatient    Junior Garay, a 49 y.o. male, for initial evaluation visit.  Met with patient.    Chief complaint/reason for encounter: depression, anxiety, and sleep    History of present illness:     LCSW and patient completed initial psychotherapy session this date, virtually. He presents alert, oriented x3, with a pleasant affect. Junior Garay is 48 y.o. year old male presenting with chronic back and right hip pain. Hurt in October 2020 lifting a hot water heater, hurting his back.    Not sleeping much, stays up watching TV. Not falling asleep until 4-5 am and then naps throughout the day.   Pamalor had been increased to 50 mg but he became sick so he went back to lower dose, 25 mg.  "He is interested in exploring other options, specifically for depression and sleep.  Increased s/s of depression. Feels he is not able to do "anything I want to do." States he does not have "anyone to kick it with." No longer driving, scared his leg will "give out". Will continue to navigate ongoing stressors affecting pt's depression and anxiety. Engaged in active discussion, constructive processing, support, feedback, and re-framing. Pt fully engaged and remains receptive. Pt to return as scheduled, in two weeks.    Goals:   Increasing movement/mobility. Last time he did physical therapy was in November.   Working to get out of the house and reconnecting with friends that he has isolated from over the past few years.     Pain: 0    Symptoms:   Mood: depressed mood, diminished interest, insomnia, fatigue, worthlessness/guilt, and poor concentration  Anxiety: excessive anxiety/worry  Substance abuse: denied  Cognitive functioning: denied  Health behaviors: noncontributory    Psychiatric history: none    Medical history:   Past Medical History:   Diagnosis Date    Anxiety     Depression     related to current illness    Hypertension     Seizures     Had seizures as a child     Family history of psychiatric illness: Yes, father suffered from depression and committed suicide when Junior was a teenager.     Social history (marriage, employment, etc.):      for 6 years.   7 children, 6 grown. Youngest is 6, the youngest comes to stay every weekend.   5 sons and 2 daughters   6 granddaughters   5 brothers and 4 sisters (all local)   Father committed suicide when he was 15 and mother passed away when he was 10.   [place] Had to stop working and "can't do the things I used to do"   See's siblings every weekend. He goes "sometimes" to family members homes on the weekend but usually "someone drops food off to me". Enjoys cooking and used to enjoy spending time with friends. "     Substance use:   Alcohol: none   Drugs: none   Tobacco: Unknown   Caffeine: Coffee    Current medications and drug reactions (include OTC, herbal): see medication list     Strengths and liabilities: Strength: Patient is expressive/articulate., Liability: Patient has poor judgment, Liability: Patient lacks coping skills.    Current Evaluation:     Mental Status Exam:  General Appearance:  unremarkable, age appropriate   Speech: normal tone, normal rate, normal pitch, normal volume      Level of Cooperation: cooperative      Thought Processes: normal and logical   Mood: depressed, sad      Thought Content: normal, no suicidality, no homicidality, delusions, or paranoia   Affect: congruent and appropriate   Orientation: Oriented x3   Memory: recent >  intact   Attention Span & Concentration: intact   Fund of General Knowledge: intact and appropriate to age and level of education   Abstract Reasoning: interpretation of similarities was concrete   Judgment & Insight: good     Language  intact     Diagnostic Impression - Plan:       ICD-10-CM ICD-9-CM   1. Adjustment disorder with mixed anxiety and depressed mood  F43.23 309.28       Plan:individual psychotherapy    Return to Clinic: 2 weeks    Length of Service (minutes): 60

## 2024-03-21 ENCOUNTER — OFFICE VISIT (OUTPATIENT)
Dept: PALLIATIVE MEDICINE | Facility: CLINIC | Age: 49
End: 2024-03-21
Payer: MEDICARE

## 2024-03-21 DIAGNOSIS — F43.23 ADJUSTMENT DISORDER WITH MIXED ANXIETY AND DEPRESSED MOOD: Primary | ICD-10-CM

## 2024-03-21 PROCEDURE — 90837 PSYTX W PT 60 MINUTES: CPT | Mod: 95,,, | Performed by: SOCIAL WORKER

## 2024-03-21 NOTE — PROGRESS NOTES
Individual Psychotherapy (PhD/LCSW)    3/21/2024    The patient location is: [place], [place]  The chief complaint leading to consultation is: Anxiety, Sleep, Chronic Pain    Visit type: audiovisual    Face to Face time with patient: 55  60 minutes of total time spent on the encounter, which includes face to face time and non-face to face time preparing to see the patient (eg, review of tests), Obtaining and/or reviewing separately obtained history, Documenting clinical information in the electronic or other health record, Independently interpreting results (not separately reported) and communicating results to the patient/family/caregiver, or Care coordination (not separately reported).     Each patient to whom he or she provides medical services by telemedicine is:  (1) informed of the relationship between the physician and patient and the respective role of any other health care provider with respect to management of the patient; and (2) notified that he or she may decline to receive medical services by telemedicine and may withdraw from such care at any time.     Site:  [facility]         Therapeutic Intervention: Met with patient.  Outpatient - Insight oriented psychotherapy 60 min - CPT code 74221 and Outpatient - Supportive psychotherapy 60 min - CPT Code 51074    Chief complaint/reason for encounter: depression, anxiety, and sleep     Interval history and content of current session: LCSW and patient completed follow up psychotherapy session this date, virtually. He presents alert, oriented x3, with a pleasant affect. He states his back pain has been worse this week, but is unsure why his pain has been heightened. Patient would like to incorporate more structured exercise and would like to go to the gym at his apartment complex. They opened the pool this week and he is looking forward to it warming up so he can swim on a regular basis. Explored ways for Junior to incorporate a more structured daily  routine.  Evaluated cognitive response, paying particular attention to negative intrusive thoughts of a persistent and detrimental nature. Thoughts of this type are in evidence with severe distress. Identified and evaluated psychosocial and environmental stressors secondary to diagnosis and treatment.     Goals discussed and reviewed. Pt goals are to reduce negative automatic thoughts, reduce physical symptoms of anxiety, reduce time spent worrying (<30 min/per day) , acquire awareness towards stress/anxiety, develop adaptive choices for problem solving and coping, and acquire relapse prevention skills.  Psychotherapy interventions include supportive, Interactive, self-oriented, and behavior modification therapies.  Pt receptive to psychotherapy. Wishes to return in 2 weeks      Treatment plan:  Target symptoms: lack of focus, recurrent depression, anxiety   Why chosen therapy is appropriate versus another modality: relevant to diagnosis, patient responds to this modality, evidence based practice  Outcome monitoring methods: self-report, observation, checklist/rating scale  Therapeutic intervention type: insight oriented psychotherapy, supportive psychotherapy    Risk parameters:  Patient reports no suicidal ideation  Patient reports no homicidal ideation  Patient reports no self-injurious behavior  Patient reports no violent behavior    Verbal deficits: None    Patient's response to intervention:  The patient's response to intervention is accepting, motivated.    Progress toward goals and other mental status changes:  The patient's progress toward goals is good.    Diagnosis:     ICD-10-CM ICD-9-CM   1. Adjustment disorder with mixed anxiety and depressed mood  F43.23 309.28     Plan:  individual psychotherapy    Return to clinic: 2 weeks    Length of Service (minutes): 60

## 2024-04-01 NOTE — NURSING
Recovery complete. Pt D/C to family with transport via wheelchair, pt A&O, ambulatory with cane, VSS, no c/o pain or distress. D/C instructions reviewed with pt and SO, all questions answered.      WOUND CARE NOTE      REASON FOR VISIT:   Chief Complaint   Patient presents with    Fall        HISTORY:  History reviewed. No pertinent past medical history.   History reviewed. No pertinent surgical history.       ASSESSMENT & TREATMENT:     Wound Description  Consulted to see pt for POA skin breakdown. Pain assessed for pain medication/intervention prior to assessment. Pain medication not indicated. Wound located to righ tlateral ankle noted with scabbed wound, 1.0x1.0x0.1cm, no drainage,  Right medial leg noted with 1.7x1.7x0.2cm, scanned wound, no drainage.   No periwound edema, erythema, induration or warmth at this time of assessment. Preventative measures in place including pressure reducing surface.       Recommendation:   ALLI surface along with aggressive offloading as tolerated by patient's medical condition. Preventative skin care. Rec- keep right lateral ankle and right medial leg MERT, RN updated on recommendations. Wound care will sign off, please re consult as needed. RN updated.       Antolin: Antolin Scale Score: 16 (04/01/24 0800)  Nutrition:    Dietary Orders (From admission, onward)       Start     Ordered    03/31/24 1814  Nutrition Communication  CONTINUOUS        Question:  Nutrition communication (specify)  Answer:  please send late dinner tray    03/31/24 1813    03/31/24 1809  Regular Diet  DIET EFFECTIVE NOW        Question:  Diet Modifiers  Answer:  Regular    03/31/24 1808                    Labs:  Recent Labs     04/01/24  0307   WBC 12.7*   GLUCOSE 118*     Allergies:   ALLERGIES:  No Known Allergies  Meds:  Current Facility-Administered Medications   Medication    warfarin (COUMADIN) tablet 8 mg    WARFARIN - PHYSICIAN MONITORED 1 each    lactated ringers infusion    aspirin (ECOTRIN) enteric coated tablet 81 mg    phenyTOIN (DILANTIN) EX capsule 100 mg    sodium chloride 0.9 % flush bag 25 mL    sodium chloride 0.9 % injection 2 mL    acetaminophen (TYLENOL) tablet 650 mg     HYDROcodone-acetaminophen (NORCO) 5-325 MG per tablet 1 tablet    HYDROmorphone (DILAUDID) injection 0.5 mg

## 2024-04-03 LAB
LEFT EYE DM RETINOPATHY: NEGATIVE
RIGHT EYE DM RETINOPATHY: NEGATIVE

## 2024-04-04 ENCOUNTER — OFFICE VISIT (OUTPATIENT)
Dept: PALLIATIVE MEDICINE | Facility: CLINIC | Age: 49
End: 2024-04-04
Payer: MEDICARE

## 2024-04-04 DIAGNOSIS — F43.23 ADJUSTMENT DISORDER WITH MIXED ANXIETY AND DEPRESSED MOOD: Primary | ICD-10-CM

## 2024-04-04 PROCEDURE — 90837 PSYTX W PT 60 MINUTES: CPT | Mod: 95,,, | Performed by: SOCIAL WORKER

## 2024-04-08 ENCOUNTER — PATIENT OUTREACH (OUTPATIENT)
Dept: ADMINISTRATIVE | Facility: HOSPITAL | Age: 49
End: 2024-04-08
Payer: MEDICARE

## 2024-04-18 ENCOUNTER — OFFICE VISIT (OUTPATIENT)
Dept: PALLIATIVE MEDICINE | Facility: CLINIC | Age: 49
End: 2024-04-18
Payer: MEDICARE

## 2024-04-18 DIAGNOSIS — F43.23 ADJUSTMENT DISORDER WITH MIXED ANXIETY AND DEPRESSED MOOD: Primary | ICD-10-CM

## 2024-04-18 PROCEDURE — 90834 PSYTX W PT 45 MINUTES: CPT | Mod: 95,,, | Performed by: SOCIAL WORKER

## 2024-04-18 NOTE — PROGRESS NOTES
Individual Psychotherapy (PhD/LCSW)    4/18/2024    The patient location is: [place], [place]  The chief complaint leading to consultation is: Anxiety, Sleep, Chronic Pain    Visit type: audiovisual    Face to Face time with patient: 46  60 minutes of total time spent on the encounter, which includes face to face time and non-face to face time preparing to see the patient (eg, review of tests), Obtaining and/or reviewing separately obtained history, Documenting clinical information in the electronic or other health record, Independently interpreting results (not separately reported) and communicating results to the patient/family/caregiver, or Care coordination (not separately reported).     Each patient to whom he or she provides medical services by telemedicine is:  (1) informed of the relationship between the physician and patient and the respective role of any other health care provider with respect to management of the patient; and (2) notified that he or she may decline to receive medical services by telemedicine and may withdraw from such care at any time.    Site:  [facility]         Therapeutic Intervention: Met with patient.  Outpatient - Insight oriented psychotherapy 45 min - CPT code 76093 and Outpatient - Supportive psychotherapy 45 min - CPT Code 89687    Chief complaint/reason for encounter: depression, anxiety, and sleep     Interval history and content of current session: LCSW and patient completed follow up psychotherapy session this date, virtually. He presents alert, oriented x3, with a pleasant affect. States he has been getting out of the house more frequently lately with his children. [place] went to [employer] [place] his daughters award presentation. His boys also took him to the [facility] to spend the day at the beach. He also has been getting into the pool at his apartment complex and is excited that he has this form of exercise now on a regular basis. Overall, feels his pain has been better  managed. Ongoing issues with sleep, feeling fatigued and this impacts his overall mood at times. Plans to discuss this at his next appointment with palliative medicine. Discussed sleep hygiene and techniques. Endorses feelings of anxiety -  restlessness, fatigued,  muscle tightness, feeling constantly on edge, concentration difficulties, and a general state of irritability that all significantly impacts pt's daily life and functioning. Explored pt's anxiety and utilized anxiety reduction techniques in order to identify pt's realistic verus unrealistic thought processes.       Goals discussed and reviewed. Pt goals are to reduce negative automatic thoughts, reduce physical symptoms of anxiety, reduce time spent worrying (<30 min/per day) , acquire awareness towards stress/anxiety, develop adaptive choices for problem solving and coping, and acquire relapse prevention skills.  Psychotherapy interventions include supportive, Interactive, self-oriented, and behavior modification therapies.  Pt receptive to psychotherapy. Wishes to return in 2 weeks     Treatment plan:  Target symptoms: lack of focus, recurrent depression, anxiety   Why chosen therapy is appropriate versus another modality: relevant to diagnosis, patient responds to this modality, evidence based practice  Outcome monitoring methods: self-report, observation, checklist/rating scale  Therapeutic intervention type: insight oriented psychotherapy, supportive psychotherapy    Risk parameters:  Patient reports no suicidal ideation  Patient reports no homicidal ideation  Patient reports no self-injurious behavior  Patient reports no violent behavior    Verbal deficits: None    Patient's response to intervention:  The patient's response to intervention is accepting, motivated.    Progress toward goals and other mental status changes:  The patient's progress toward goals is good.    Diagnosis:     ICD-10-CM ICD-9-CM   1. Adjustment disorder with mixed anxiety and  depressed mood  F43.23 309.28     Plan:  individual psychotherapy    Return to clinic: 2 weeks    Length of Service (minutes): 46

## 2024-04-23 ENCOUNTER — OFFICE VISIT (OUTPATIENT)
Dept: PALLIATIVE MEDICINE | Facility: CLINIC | Age: 49
End: 2024-04-23
Payer: MEDICARE

## 2024-04-23 VITALS — BODY MASS INDEX: 30.13 KG/M2 | WEIGHT: 210 LBS

## 2024-04-23 DIAGNOSIS — G47.00 INSOMNIA, UNSPECIFIED TYPE: Primary | ICD-10-CM

## 2024-04-23 DIAGNOSIS — S73.191D TEAR OF RIGHT ACETABULAR LABRUM, SUBSEQUENT ENCOUNTER: ICD-10-CM

## 2024-04-23 PROCEDURE — 99214 OFFICE O/P EST MOD 30 MIN: CPT | Mod: 95,,, | Performed by: STUDENT IN AN ORGANIZED HEALTH CARE EDUCATION/TRAINING PROGRAM

## 2024-04-23 RX ORDER — RAMELTEON 8 MG/1
8 TABLET ORAL NIGHTLY
Qty: 30 TABLET | Refills: 3 | Status: SHIPPED | OUTPATIENT
Start: 2024-04-23 | End: 2024-06-09 | Stop reason: SDUPTHER

## 2024-04-23 RX ORDER — LORAZEPAM 0.5 MG/1
0.5 TABLET ORAL
Qty: 2 TABLET | Refills: 0 | Status: SHIPPED | OUTPATIENT
Start: 2024-04-23 | End: 2024-05-23

## 2024-04-23 RX ORDER — OXYCODONE HYDROCHLORIDE 10 MG/1
10 TABLET ORAL EVERY 6 HOURS PRN
Qty: 150 TABLET | Refills: 0 | Status: SHIPPED | OUTPATIENT
Start: 2024-04-23

## 2024-04-23 NOTE — PATIENT INSTRUCTIONS
Regular bedtime and rise time Having a consistent bedtime and rise time leads to more regular sleep schedules and avoids periods of sleep deprivation or periods of extended wakefulness during the night.   Avoid napping Avoid napping, especially naps lasting longer than 1 hour and naps late in the day.   Limit caffeine Avoid caffeine after lunch. The time between lunch and bedtime represents approximately 2 half-lives for caffeine, and this time window allows for most caffeine to be metabolized before bedtime.   Limit alcohol Recommendations are typically focused on avoiding alcohol near bedtime. Alcohol is initially sedating, but activating as it is metabolized. Alcohol also negatively impacts sleep architecture.   Avoid nicotine Nicotine is a stimulant and should be avoided near bedtime and at night.   Exercise Daytime physical activity is encouraged, in particular, 4 to 6 hours before bedtime, as this may facilitate sleep onset. Rigorous exercise within 2 hours of bedtime is discouraged.   Keep the sleep environment quiet and dark Noise and light exposure during the night can disrupt sleep. White noise or ear plugs are often recommended to reduce noise. Using blackout shades or an eye mask is commonly recommended to reduce light.  This may also include avoiding exposure to television or technology near bedtime, as this can have an impact on circadian rhythms by shifting sleep timing later.   Bedroom clock Avoid checking the time at night. This includes alarm clocks and other time pieces (eg, watches and smart phones). Checking the time increases cognitive arousal and prolongs wakefulness.   Evening eating Avoid a large meal close to bedtime. Eat a healthy and filling (but not too heavy) meal in the early evening and avoid late-night snacks.

## 2024-04-23 NOTE — PROGRESS NOTES
Palliative Medicine Clinic Note      Consult Requested By: No ref. provider found    Primary Care Physician:   Tracy Melgar NP    Reason for Consult: Advance care planning and symptom management in the setting of chronic post-surgical back pain and hip pain    The patient location is: [place]  The chief complaint leading to consultation is: r hip pain    Visit type: audiovisual    Face to Face time with patient: 12 minutes  30 minutes of total time spent on the encounter, which includes face to face time and non-face to face time preparing to see the patient (eg, review of tests), Obtaining and/or reviewing separately obtained history, Documenting clinical information in the electronic or other health record, Independently interpreting results (not separately reported) and communicating results to the patient/family/caregiver, or Care coordination (not separately reported).       Each patient provided with medical services by telemedicine is:  (1) informed of the relationship between the physician and patient and the respective role of any other health care provider with respect to management of the patient; and (2) notified that he or she may decline to receive medical services by telemedicine and may withdraw from such care at any time.             ASSESSMENT/PLAN:     Plan/Recommendations:  Junior was seen today for pain.    Diagnoses and all orders for this visit:    Insomnia, unspecified type  -     ramelteon (ROZEREM) 8 mg tablet; Take 1 tablet (8 mg total) by mouth every evening.    Tear of right acetabular labrum, subsequent encounter  -     oxyCODONE (ROXICODONE) 10 mg Tab immediate release tablet; Take 1 tablet (10 mg total) by mouth every 6 (six) hours as needed for Pain.    Other orders  -     LORazepam (ATIVAN) 0.5 MG tablet; Take 1 tablet (0.5 mg total) by mouth On call Procedure (Prior to MRI).             Hip Pain/Labral Tear  Walking better, no recent falls  No improvement despite PT and  continuing home exercises  Referred to orthopedics, pt reports he was told no surgical repair option at this time  Upcoming repeat Lumbar imaging per NSGY  Have previously discussed with Dr. Lundy with pain management, can consider for pain pump/spinal stimulator for failed back syndrome if not responding to oral therapies  Pt developed intolerable nausea when starting gabapentin/cymbalta, has stopped taking both.   INCREASE nortriptyline to 50mg QHS   Continue Oxycodone 10mg q6h PRN  Opioid therapy has resulted in increased functional ability (tolerating occasional walks, sleeping better due to decreased pain)  Advised against BZD use given our plan to increase opioids  NARCAN prescribed for harm reduction  Continue Mobic 15mg daily    Insomnia  Pt reports pain as primary factor preventing adequate sleep, improved with prn oxycodone  Advised against BZD use given our plan to increase opioids  Increase nortriptyline 50mg QHS  Also prescribed Ramelteon 8mg  Advised the patient to maintain a consistent sleep schedule, limit daytime napping to no more than 1 hour, and avoid caffeine intake after lunch.   Educated the patient on the importance of good sleep hygiene and the potential risks of combining sleep medications with opioids.   Provided information on sleep hygiene in the after-visit summary for the patient's reference.     Anxiety:   - Referred the patient to [facility] Packer for counseling to manage anxiety.   - Explained that counseling could equip the patient with coping mechanisms and techniques to better handle his anxiety.     Constipation  CAPC PRN constipation escalation pathway provided and explained to patient     Advance Care Planning   Advance Directives:     Decision Making:  Patient answered questions  Goals of Care: What is most important right now is to focus on remaining as independent as possible, symptom/pain control. Accordingly, we have decided that the best plan to meet the patient's goals  includes continuing with treatment.               Understanding of disease and Illness Trajectory: Patient  has  adequate understanding of his illness, they can benefit from continued education on what to expect in the future.      5 min time was spent on advance care planning, goals of care discussion, emotional support, formulating and communicating prognosis and goals of care, exploring burden/benefit of various approaches of treatment.        Follow up: No follow-ups on file.    Plan discussed with Dr. Melgar    SUBJECTIVE:     History obtained from: Patient, past medical records.      complaint:   Chief Complaint   Patient presents with    Pain     hip           History of Present Illness / Interval History:  Junior Garay is 49 y.o. year old male presenting with chronic back and right hip pain.  Referred to Palliative Care for evaluation and management of physical symptoms. They attended the appointment with his son, Junior Resendiz     04/23/2024 History of Present Illness    CHIEF COMPLAINT:  - Pain management  - Difficulty falling asleep due to chronic pain    HISTORY OBTAINED FROM:  - Patient    HPI:  The patient reports experiencing significant pain today, which has been exacerbated due to running out of his Oxycodone medication. This long-standing issue has been somewhat managed with Oxycodone, but the recent lack of medication has led to an increase in discomfort. The patient also mentions difficulty in falling asleep due to the pain. Despite the pain, the patient has been trying to maintain some physical activity by practicing walking without a cane, with the assistance of his grandchildren. However, the pain has been a significant hindrance to these efforts. The patient also mentions that cold weather exacerbates his pain. The patient denies experiencing chills or diarrhea. He also denies any consumption of alcohol or coffee, and does not smoke or use nicotine.    ACTIVITIES OF DAILY  LIVING:  - Difficulty falling asleep due to pain is reported.  - Walking without the use of a cane is being practiced, with assistance from grandchildren.  - Access to a pool at the apartment complex is available for physical activity.  - Chronic pain, exacerbated by cold weather, is reported.    SOCIAL HISTORY:  - Does not consume alcohol  - Does not smoke or use nicotine          02/23/2024: Pain and insomnia a bit improved. He and his son have been going for walks, trying to get some more exercise. History of Present Illness    CHIEF COMPLAINT:  - Hip pain  - Labral tear  - Insomnia    HPI:  Patient presents for a follow-up regarding hip pain, labral tear, and insomnia. The orthopedist informed the patient that he did not have enough arthritis for hip surgery. The patient is currently trying to set up an MRI to further investigate the issue. The patient has been taking nortriptyline and oxycodone for pain management and sleep, mentioning that 1.5 oxycodone tablets at bedtime helps with sleep by reducing pain. The patient describes the pain in the hip and leg as shooting, stabbing, and sharp. The patient has been able to go on walks with his son around their apartment complex. The patient also experiences anxiety, describing it as feeling like everything is caving in on him, with good and bad days. Patient denies any worsening of anxiety due to medication.         Dec 21 2023: Mr pastrana fell last night while trying to get out of bed and is significant pain today, rocking back and forth in the bed. He notes he can't lay on his right side at all due to pain in his right hip. He is able to lay on his back for a while until it begins hurting, then he moves to his left hip. He has been laying on a heating pad and trying to do stretches he was taught by PT with little effect. He reports the Norco tablets he had been taking at night may have helped the pain a bit, but he is not sure. He tried taking the gabapentin and  cymbalta, but again began to develop nausea and vomiting. He saw his PCP yesterday, who prescribed a 10d trial of xanax for insomnia.     11/22/23: Mr. Garay is a previously healthy gentleman who unfortunately in Oct 2020, fell while lifting a hot water heater at work. He was evaluated repeatedly in urgent care as his symptoms did not improve with conservative measures and PT. And MRI of his spine at that time revealed a MRI of the lumbosacral spine which revealed a herniated intervertebral disc with compromise of the L4-5 nerve root on the left. CSI did not improve. Patient was evaluated by neurosurgery and underwent a Lumbar Spinal fusion, TLIF, MINIMALLY INVASIVE L4-5, L5-S1 on 06/02/2022. However, his post-op course was complicated by persistent right hip, buttocks and anterior thigh pain. Patient with minimal relief s/p SI joint and GTB injection. He has continued to follow with physical therapy and sports medicine for evaluation of his right hip pain.     He continues to have pain which shoots down right leg, to his knee and into calves. He also has stabbing pain in his right hip/low back, particularly when laying down. His greatest concern at this time is that he has extreme difficulty sleeping. He can't sleep on right side, and currently his son has to help him get out of the bed, tie his shoes. Sits on a chair in the kitchen next to the stove to cook. He has been unable to work since his surgery, and cannot pass a CDL physical. Can't bend down to lift anything up.  He reports that now he spends much of his time just sitting at home, and is currently without an income. He is living with his son, and has 7 kids who all come together to help him out. He does not some issues with anxiety, occasionally feeling like things are closing in on him. This will happen any time he is on a long drive; has to stop and get out the car.     With regards to his medication regimen, he was previously prescribed multimodal  pain therapies but stopped due to nausea/vomiting, which he attributed to the gabapentin.  However, currently he is only taking losartan for blood pressure and no other medications.       Review of Symptoms      Symptom Assessment (ESAS 0-10 Scale)  Pain:  9  Dyspnea:  0  Anxiety:  7  Nausea:  0  Depression:  3  Anorexia:  1  Fatigue:  0  Insomnia:  10  Restlessness:  3  Agitation:  0     CAM / Delirium:  Negative  Constipation:  Negative  Diarrhea:  Negative      Pain Assessment:    Location(s): back    Back       Location: bilateral and right        Quality: Shooting, stabbing and sharp        Quantity: 10/10 in intensity        Chronicity: Onset 2 year(s) ago, unchanged        Aggravating Factors: Recumbency and pressure        Alleviating Factors: None       Associated Symptoms: None    Performance Status:  60    Living Arrangements:  Lives in home and Lives with family    Psychosocial/Cultural:   See Palliative Psychosocial Note: Yes  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  7 children, 29, 29, 26, 24, 21, 18, 5 yo kids  5yo into sports and computers  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  **Primary  to Follow**  Palliative Care  Consult: Yes          Disease History:  As per hpi    Previous experience or exposure to a serious illness: No        Medications:    Current Outpatient Medications:     LORazepam (ATIVAN) 0.5 MG tablet, Take 1 tablet (0.5 mg total) by mouth On call Procedure (Prior to MRI)., Disp: 2 tablet, Rfl: 0    losartan (COZAAR) 100 MG tablet, Take 1 tablet (100 mg total) by mouth once daily., Disp: 90 tablet, Rfl: 3    meloxicam (MOBIC) 15 MG tablet, Take 1 tablet (15 mg total) by mouth once daily., Disp: 30 tablet, Rfl: 1    nortriptyline (PAMELOR) 50 MG capsule, Take 1 capsule (50 mg total) by mouth every evening., Disp: 30 capsule, Rfl: 11    oxyCODONE (ROXICODONE) 10 mg Tab immediate release tablet, Take 1 tablet (10 mg total) by mouth every 6  (six) hours as needed for Pain., Disp: 150 tablet, Rfl: 0    ramelteon (ROZEREM) 8 mg tablet, Take 1 tablet (8 mg total) by mouth every evening., Disp: 30 tablet, Rfl: 3      External  database queried on 04/23/2024  by Pankaj Pyle .   The results reviewed and considered with the clinical data in the decision whether or not to prescribe a controlled substance.       Past Medical History:   Diagnosis Date    Anxiety     Depression     related to current illness    Hypertension     Seizures     Had seizures as a child     Past Surgical History:   Procedure Laterality Date    ABDOMINAL SURGERY      APPENDECTOMY      BACK SURGERY      COLON SURGERY      COLONOSCOPY N/A 7/7/2023    Procedure: COLONOSCOPY;  Surgeon: Reginald Billingsley MD;  Location: [facility] ENDO;  Service: General;  Laterality: N/A;    COLONOSCOPY N/A 10/4/2023    Procedure: COLONOSCOPY;  Surgeon: Reginald Billingsley MD;  Location: [facility];  Service: General;  Laterality: N/A;    INJECTION, SACROILIAC JOINT Right 7/28/2022    Procedure: INJECTION,SACROILIAC JOINT RIGHT SI & RIGHT GTB;  Surgeon: Ramya Lundy MD;  Location: [facility] PAIN MGT;  Service: Pain Management;  Laterality: Right;    MINIMALLY INVASIVE TRANSFORAMINAL LUMBAR INTERBODY FUSION (TLIF) N/A 6/2/2022    Procedure: FUSION, SPINE, LUMBAR, TLIF, MINIMALLY INVASIVE L4-5, L5-S1;  Surgeon: Humberto Rhodes MD;  Location: [facility] OR [address] [place] [address];  Service: Neurosurgery;  Laterality: N/A;  [place] Table 4 poster, prone, neuromonitoring, globus robot, Heaven Giron     TRANSFORAMINAL EPIDURAL INJECTION OF STEROID Right 07/12/2021    Procedure: LUMBAR TRANSFORAMINAL RIGHT L4/5 DIRECT REFERRAL NEEDS CONSENT;  Surgeon: Ramya Lundy MD;  Location: [facility] PAIN MGT;  Service: Pain Management;  Laterality: Right;     Family History   Problem Relation Name Age of Onset    Cancer Mother      Heart attack Mother      Colon cancer Mother      Suicide Father       Review of patient's allergies indicates:  No Known  Allergies    OBJECTIVE:       Physical Exam:  Vitals:    Physical Exam  Unable to perform full physical exam due to telemedicine visit.  Limited exam:  Gen: NAD; pleasant and engaged conversation; obvious discomfort with facial grimacing and difficulty concentrating  Non diaphoretic  Speech normal  No increased work of breathing  No cyanosis    Labs:  CBC:   WBC   Date Value Ref Range Status   12/13/2023 5.50 3.90 - 12.70 K/uL Final       Hemoglobin   Date Value Ref Range Status   12/13/2023 14.6 14.0 - 18.0 g/dL Final       Hematocrit   Date Value Ref Range Status   12/13/2023 44.0 40.0 - 54.0 % Final       MCV   Date Value Ref Range Status   12/13/2023 95 82 - 98 fL Final       Platelets   Date Value Ref Range Status   12/13/2023 338 150 - 450 K/uL Final       Lab Results   Component Value Date    CREATININE 1.1 12/13/2023    BUN 11 12/13/2023     12/13/2023    K 4.5 12/13/2023     12/13/2023    CO2 26 12/13/2023        LFT:   Lab Results   Component Value Date    AST 24 12/13/2023    ALKPHOS 85 12/13/2023    BILITOT 0.5 12/13/2023       Albumin:   Albumin   Date Value Ref Range Status   12/13/2023 3.9 3.5 - 5.2 g/dL Final     Protein:   Total Protein   Date Value Ref Range Status   12/13/2023 7.0 6.0 - 8.4 g/dL Final         I spent a total of 30 minutes on the day of the visit. This includes face to face time in discussion of goals of care, symptom assessment, coordination of care and emotional support.  This also includes non-face to face time preparing to see the patient (eg, review of tests/imaging), obtaining and/or reviewing separately obtained history, documenting clinical information in the electronic or other health record, independently interpreting results and communicating results to the patient/family/caregiver, or care coordinator.     5 minutes spent in discussing ACP    This note was partially created using voice recognition software. Typographical and content errors may occur with  this process. While efforts are made to detect and correct such errors, in some cases errors will persist. For this reason, wording in this document should be considered in the proper context and not strictly verbatim.      Signature: Pankaj Pyle, DO

## 2024-05-02 ENCOUNTER — OFFICE VISIT (OUTPATIENT)
Dept: PALLIATIVE MEDICINE | Facility: CLINIC | Age: 49
End: 2024-05-02
Payer: MEDICARE

## 2024-05-02 DIAGNOSIS — F43.23 ADJUSTMENT DISORDER WITH MIXED ANXIETY AND DEPRESSED MOOD: Primary | ICD-10-CM

## 2024-05-02 PROCEDURE — 90834 PSYTX W PT 45 MINUTES: CPT | Mod: 95,,, | Performed by: SOCIAL WORKER

## 2024-05-02 NOTE — PROGRESS NOTES
"Individual Psychotherapy (PhD/LCSW)    5/2/2024    The patient location is: [place], [place]  The chief complaint leading to consultation is: Anxiety, Sleep, Chronic Pain    Visit type: audiovisual    Face to Face time with patient: 46  60 minutes of total time spent on the encounter, which includes face to face time and non-face to face time preparing to see the patient (eg, review of tests), Obtaining and/or reviewing separately obtained history, Documenting clinical information in the electronic or other health record, Independently interpreting results (not separately reported) and communicating results to the patient/family/caregiver, or Care coordination (not separately reported).     Each patient to whom he or she provides medical services by telemedicine is:  (1) informed of the relationship between the physician and patient and the respective role of any other health care provider with respect to management of the patient; and (2) notified that he or she may decline to receive medical services by telemedicine and may withdraw from such care at any time.    Site:  [facility]         Therapeutic Intervention: Met with patient.  Outpatient - Insight oriented psychotherapy 45 min - CPT code 75746 and Outpatient - Supportive psychotherapy 45 min - CPT Code 43723    Chief complaint/reason for encounter: depression, anxiety, and sleep     Interval history and content of current session: LCSW and patient completed follow up psychotherapy session this date, virtually. He presents alert, oriented x3, with a pleasant affect. States that he made a new friend who he has been spending time with. He is planning to go to the [facility] this upcoming weekend. States he is significantly sleeping better and notices an improvement in his overall mood and levels of anxiety/depression. "I have had a really good week." States he has been getting out of the house more frequently lately with his children.  He also has " been getting into the pool at his apartment complex and is excited that he has this form of exercise now on a regular basis. Overall, feels his pain has been better managed.  Explored pt's anxiety and utilized anxiety reduction techniques in order to identify pt's realistic verus unrealistic thought processes.     Goals discussed and reviewed. Pt goals are to reduce negative automatic thoughts, reduce physical symptoms of anxiety, reduce time spent worrying (<30 min/per day) , acquire awareness towards stress/anxiety, develop adaptive choices for problem solving and coping, and acquire relapse prevention skills.  Psychotherapy interventions include supportive, Interactive, self-oriented, and behavior modification therapies.  Pt receptive to psychotherapy. Wishes to return in 2 weeks     Treatment plan:  Target symptoms: lack of focus, recurrent depression, anxiety   Why chosen therapy is appropriate versus another modality: relevant to diagnosis, patient responds to this modality, evidence based practice  Outcome monitoring methods: self-report, observation, checklist/rating scale  Therapeutic intervention type: insight oriented psychotherapy, supportive psychotherapy    Risk parameters:  Patient reports no suicidal ideation  Patient reports no homicidal ideation  Patient reports no self-injurious behavior  Patient reports no violent behavior    Verbal deficits: None    Patient's response to intervention:  The patient's response to intervention is accepting, motivated.    Progress toward goals and other mental status changes:  The patient's progress toward goals is good.    Diagnosis:     ICD-10-CM ICD-9-CM   1. Adjustment disorder with mixed anxiety and depressed mood  F43.23 309.28     Plan:  individual psychotherapy    Return to clinic: 2 weeks    Length of Service (minutes): 46

## 2024-05-20 ENCOUNTER — E-VISIT (OUTPATIENT)
Dept: PAIN MEDICINE | Facility: CLINIC | Age: 49
End: 2024-05-20
Attending: ANESTHESIOLOGY
Payer: MEDICARE

## 2024-05-20 DIAGNOSIS — R29.898 WEAKNESS OF LOWER EXTREMITY, UNSPECIFIED LATERALITY: ICD-10-CM

## 2024-05-20 DIAGNOSIS — M25.559 HIP PAIN, UNSPECIFIED LATERALITY: Primary | ICD-10-CM

## 2024-05-20 PROCEDURE — 99212 OFFICE O/P EST SF 10 MIN: CPT | Mod: 95,,, | Performed by: ANESTHESIOLOGY

## 2024-05-24 ENCOUNTER — OFFICE VISIT (OUTPATIENT)
Dept: PALLIATIVE MEDICINE | Facility: CLINIC | Age: 49
End: 2024-05-24
Payer: MEDICARE

## 2024-05-24 DIAGNOSIS — F43.23 ADJUSTMENT DISORDER WITH MIXED ANXIETY AND DEPRESSED MOOD: Primary | ICD-10-CM

## 2024-05-24 DIAGNOSIS — G47.00 INSOMNIA, UNSPECIFIED TYPE: ICD-10-CM

## 2024-05-24 PROCEDURE — 90834 PSYTX W PT 45 MINUTES: CPT | Mod: 95,,, | Performed by: SOCIAL WORKER

## 2024-05-24 NOTE — PROGRESS NOTES
Individual Psychotherapy (PhD/LCSW)    5/24/2024    The patient location is: [place], [place]  The chief complaint leading to consultation is: Anxiety, Sleep, Chronic Pain    Visit type: audiovisual    Face to Face time with patient: 46  60 minutes of total time spent on the encounter, which includes face to face time and non-face to face time preparing to see the patient (eg, review of tests), Obtaining and/or reviewing separately obtained history, Documenting clinical information in the electronic or other health record, Independently interpreting results (not separately reported) and communicating results to the patient/family/caregiver, or Care coordination (not separately reported).     Each patient to whom he or she provides medical services by telemedicine is:  (1) informed of the relationship between the physician and patient and the respective role of any other health care provider with respect to management of the patient; and (2) notified that he or she may decline to receive medical services by telemedicine and may withdraw from such care at any time.    Site:  [facility]         Therapeutic Intervention: Met with patient.  Outpatient - Insight oriented psychotherapy 45 min - CPT code 28287 and Outpatient - Supportive psychotherapy 45 min - CPT Code 18305    Chief complaint/reason for encounter: depression, anxiety, and sleep     Interval history and content of current session: LCSW and patient completed follow up psychotherapy session this date, virtually. He presents alert, oriented x3, with a pleasant affect. He states that he was getting out of his friends truck and his foot caught, he broke his toe and twisted his ankle. He has not gone to the doctor but is considering going tomorrow due to increased pain in his ankle and hip. States he is significantly sleeping better and notices an improvement in his overall mood and levels of anxiety/depression.     He also has been getting into the pool  at his apartment complex and is excited that he has this form of exercise now on a regular basis. Overall, feels his pain has been better managed.  Explored pt's anxiety and utilized anxiety reduction techniques in order to identify pt's realistic verus unrealistic thought processes.       Goals discussed and reviewed. Pt goals are to reduce negative automatic thoughts, reduce physical symptoms of anxiety, reduce time spent worrying (<30 min/per day) , acquire awareness towards stress/anxiety, develop adaptive choices for problem solving and coping, and acquire relapse prevention skills.  Psychotherapy interventions include supportive, Interactive, self-oriented, and behavior modification therapies.  Pt receptive to psychotherapy. Wishes to return in 2 weeks     Treatment plan:  Target symptoms: lack of focus, recurrent depression, anxiety   Why chosen therapy is appropriate versus another modality: relevant to diagnosis, patient responds to this modality, evidence based practice  Outcome monitoring methods: self-report, observation, checklist/rating scale  Therapeutic intervention type: insight oriented psychotherapy, supportive psychotherapy    Risk parameters:  Patient reports no suicidal ideation  Patient reports no homicidal ideation  Patient reports no self-injurious behavior  Patient reports no violent behavior    Verbal deficits: None    Patient's response to intervention:  The patient's response to intervention is accepting, motivated.    Progress toward goals and other mental status changes:  The patient's progress toward goals is good.    Diagnosis:     ICD-10-CM ICD-9-CM   1. Adjustment disorder with mixed anxiety and depressed mood  F43.23 309.28   2. Insomnia, unspecified type  G47.00 780.52       Plan:  individual psychotherapy    Return to clinic: 2 weeks    Length of Service (minutes): 46

## 2024-05-25 NOTE — PROGRESS NOTES
I have not seen since 2022. Would need in person evaluation. If leg is very weak needs to be seen ASAP.   L spine and hip radiographs reviewed with mild DJD and stable hardware.

## 2024-06-04 ENCOUNTER — PATIENT MESSAGE (OUTPATIENT)
Dept: PALLIATIVE MEDICINE | Facility: CLINIC | Age: 49
End: 2024-06-04
Payer: MEDICARE

## 2024-06-09 ENCOUNTER — PATIENT MESSAGE (OUTPATIENT)
Dept: NEUROSURGERY | Facility: CLINIC | Age: 49
End: 2024-06-09
Payer: MEDICARE

## 2024-06-09 DIAGNOSIS — G47.00 INSOMNIA, UNSPECIFIED TYPE: ICD-10-CM

## 2024-06-10 RX ORDER — RAMELTEON 8 MG/1
8 TABLET ORAL NIGHTLY
Qty: 30 TABLET | Refills: 3 | Status: SHIPPED | OUTPATIENT
Start: 2024-06-10

## 2024-06-12 ENCOUNTER — TELEPHONE (OUTPATIENT)
Dept: NEUROSURGERY | Facility: CLINIC | Age: 49
End: 2024-06-12
Payer: MEDICARE

## 2024-06-12 NOTE — TELEPHONE ENCOUNTER
Called and spoke with patient regarding having claustrophobia and wanting an open MRI. Let patient know that we sent the information over to our pre-authorization team for the external imaging.

## 2024-06-18 ENCOUNTER — TELEPHONE (OUTPATIENT)
Dept: NEUROSURGERY | Facility: CLINIC | Age: 49
End: 2024-06-18
Payer: MEDICARE

## 2024-06-18 NOTE — TELEPHONE ENCOUNTER
Sent a portal message to patient to let patient know that imaging has been approved and is good to schedule now.    ----- Message from Senia Moulton sent at 6/18/2024  3:06 PM CDT -----  Regarding: RE: External Imaging Auth  Approved  ----- Message -----  From: Danelle Urbina  Sent: 6/18/2024   8:19 AM CDT  To: Senia Moulton  Subject: External Imaging Auth                            Good morning and Happy Tuesday,     Could you please assist with external imaging authorization for this patient? The orders have already been placed.    [facility]  Location: [address] [place] [address] [address], [place] [address]   Phone: [phone]   Fax: [phone]    TIN: [id number]   NPI: [id number]     Please let me know of any issues. Thank you!

## 2024-06-27 ENCOUNTER — OFFICE VISIT (OUTPATIENT)
Dept: PALLIATIVE MEDICINE | Facility: CLINIC | Age: 49
End: 2024-06-27
Payer: MEDICARE

## 2024-06-27 DIAGNOSIS — S73.191D TEAR OF RIGHT ACETABULAR LABRUM, SUBSEQUENT ENCOUNTER: ICD-10-CM

## 2024-06-27 RX ORDER — OXYCODONE HYDROCHLORIDE 10 MG/1
10 TABLET ORAL EVERY 6 HOURS PRN
Qty: 150 TABLET | Refills: 0 | Status: SHIPPED | OUTPATIENT
Start: 2024-06-27

## 2024-06-27 NOTE — PATIENT INSTRUCTIONS
Getting Started with Mindfulness - Mindful   https://www.mindful.org/meditation/argprvjywcs-fpojbps-[email or web address]     Curable: A Different Approach to Chronic Pain ([email or web address])   [email or web address]
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
## 2024-07-08 ENCOUNTER — TELEPHONE (OUTPATIENT)
Dept: NEUROSURGERY | Facility: CLINIC | Age: 49
End: 2024-07-08
Payer: MEDICARE

## 2024-07-08 NOTE — TELEPHONE ENCOUNTER
Called and spoke with patient to remind him of his appointment tomorrow and to bring imaging on disc. Patient states that he has to go and get the disc tomorrow prior to appointment. Patient also mentioned he was unable to get MRI done as he is claustrophobic.

## 2024-07-09 ENCOUNTER — OFFICE VISIT (OUTPATIENT)
Dept: NEUROSURGERY | Facility: CLINIC | Age: 49
End: 2024-07-09
Payer: MEDICARE

## 2024-07-09 DIAGNOSIS — M51.36 DDD (DEGENERATIVE DISC DISEASE), LUMBAR: Primary | ICD-10-CM

## 2024-07-09 PROCEDURE — 99211 OFF/OP EST MAY X REQ PHY/QHP: CPT | Mod: PBBFAC | Performed by: NEUROLOGICAL SURGERY

## 2024-07-09 PROCEDURE — 99999 PR PBB SHADOW E&M-EST. PATIENT-LVL I: CPT | Mod: PBBFAC,,, | Performed by: NEUROLOGICAL SURGERY

## 2024-07-09 PROCEDURE — 99214 OFFICE O/P EST MOD 30 MIN: CPT | Mod: S$PBB,,, | Performed by: NEUROLOGICAL SURGERY

## 2024-07-09 NOTE — PROGRESS NOTES
Neurosurgery  Established Patient    SCRIBE #1 NOTE: I, Any Salas, am scribing for, and in the presence of,  Humberto Rhodes. I have scribed the entire note.        SUBJECTIVE:     History of Present Illness:  49 y.o. male, with a PMHx of HTN, presents for f/u after last evaluation on 1/30/2024 with Vesta Agarwal NP. Patient is a workers comp patient with previous L4-5, L5-S1 interbody fusion done in June 2022. Back pain is improved but he continues to have right hip pain. He's been evaluated by orthopedics and found to have significant hip disease and has been advised that he needs a right hip replacement. He continues to have mainly hip related issues, no radiculopathy. CT from outside from outside facility shows good hardware position and patient is solidly fused.     Review of patient's allergies indicates:  No Known Allergies  Current Outpatient Medications   Medication Sig Dispense Refill    losartan (COZAAR) 100 MG tablet Take 1 tablet (100 mg total) by mouth once daily. 90 tablet 3    nortriptyline (PAMELOR) 50 MG capsule Take 1 capsule (50 mg total) by mouth every evening. 30 capsule 11    oxyCODONE (ROXICODONE) 10 mg Tab immediate release tablet Take 1 tablet (10 mg total) by mouth every 6 (six) hours as needed for Pain. 150 tablet 0    ramelteon (ROZEREM) 8 mg tablet Take 1 tablet (8 mg total) by mouth every evening. 30 tablet 3    LORazepam (ATIVAN) 0.5 MG tablet Take 1 tablet (0.5 mg total) by mouth On call Procedure (Prior to MRI). 2 tablet 0     No current facility-administered medications for this visit.     Past Medical History:   Diagnosis Date    Anxiety     Depression     related to current illness    Hypertension     Seizures     Had seizures as a child     Past Surgical History:   Procedure Laterality Date    ABDOMINAL SURGERY      APPENDECTOMY      BACK SURGERY      COLON SURGERY      COLONOSCOPY N/A 7/7/2023    Procedure: COLONOSCOPY;  Surgeon: Reginald Billingsley MD;  Location: [facility];   Service: General;  Laterality: N/A;    COLONOSCOPY N/A 10/4/2023    Procedure: COLONOSCOPY;  Surgeon: Reginald Billingsley MD;  Location: [facility];  Service: General;  Laterality: N/A;    INJECTION, SACROILIAC JOINT Right 7/28/2022    Procedure: INJECTION,SACROILIAC JOINT RIGHT SI & RIGHT GTB;  Surgeon: Ramya Lundy MD;  Location: [facility];  Service: Pain Management;  Laterality: Right;    MINIMALLY INVASIVE TRANSFORAMINAL LUMBAR INTERBODY FUSION (TLIF) N/A 6/2/2022    Procedure: FUSION, SPINE, LUMBAR, TLIF, MINIMALLY INVASIVE L4-5, L5-S1;  Surgeon: Humberto Rhodes MD;  Location: [facility] OR [address] [place] [address];  Service: Neurosurgery;  Laterality: N/A;  [place] Table 4 poster, prone, neuromonitoring, globus robot, Heaven Giron     TRANSFORAMINAL EPIDURAL INJECTION OF STEROID Right 07/12/2021    Procedure: LUMBAR TRANSFORAMINAL RIGHT L4/5 DIRECT REFERRAL NEEDS CONSENT;  Surgeon: Ramya Lundy MD;  Location: [facility];  Service: Pain Management;  Laterality: Right;     Family History       Problem Relation (Age of Onset)    Cancer Mother    Colon cancer Mother    Heart attack Mother    Suicide Father          Social History     Socioeconomic History    Marital status:     Number of children: 7   Tobacco Use    Smoking status: Former     Current packs/day: 0.50     Average packs/day: 0.5 packs/day for 10.0 years (5.0 ttl pk-yrs)     Types: Cigarettes    Smokeless tobacco: Never    Tobacco comments:     Quit 3-4 years ago (2024)   Substance and Sexual Activity    Alcohol use: No     Comment: socially- wedding or party    Drug use: No    Sexual activity: Not Currently     Partners: Female   Social History Narrative    Stairs- none     Social Determinants of Health     Financial Resource Strain: Medium Risk (11/15/2023)    Overall Financial Resource Strain (CARDIA)     Difficulty of Paying Living Expenses: Somewhat hard   Food Insecurity: Food Insecurity Present (11/15/2023)    Hunger Vital Sign     Worried About Running Out  of Food in the Last Year: Sometimes true     Ran Out of Food in the Last Year: Sometimes true   Transportation Needs: Unmet Transportation Needs (11/15/2023)    PRAPARE - Transportation     Lack of Transportation (Medical): Yes     Lack of Transportation (Non-Medical): Yes   Physical Activity: Insufficiently Active (11/15/2023)    Exercise Vital Sign     Days of Exercise per Week: 2 days     Minutes of Exercise per Session: 10 min   Stress: Stress Concern Present (11/15/2023)    [facility] [employer] - Occupational Stress Questionnaire     Feeling of Stress : Very much   Housing Stability: High Risk (11/15/2023)    Housing Stability Vital Sign     Unable to Pay for Housing in the Last Year: Yes     Number of Places Lived in the Last Year: 1     Unstable Housing in the Last Year: No     Review of Systems   Musculoskeletal:         Right hip pain.   All other systems reviewed and are negative.    OBJECTIVE:     Vital Signs  Pain Score:   8  There is no height or weight on file to calculate BMI.  Physical Exam:    Constitutional: He appears well-developed and well-nourished. He is not diaphoretic. No distress.     Psych/Behavior: He is alert. He is oriented to person, place, and time. He has a normal mood and affect.         ASSESSMENT/PLAN:   Patient with an L4-5, L5-S1 lumbar interbody fusion, now with right hip issues. He should get this addressed and I reassured him that his spine looks good and there are no new neurosurgical issues currently.         I, KATH Rhodes, personally performed the services described in this documentation. All medical record entries made by the scribe were at my direction and in my presence. I have reviewed the chart and agree that the record reflects my personal performance and is accurate and complete.     Note dictated with voice recognition software, please excuse any grammatical errors.

## 2024-07-28 ENCOUNTER — PATIENT MESSAGE (OUTPATIENT)
Dept: PALLIATIVE MEDICINE | Facility: CLINIC | Age: 49
End: 2024-07-28
Payer: MEDICARE

## 2024-08-27 ENCOUNTER — OFFICE VISIT (OUTPATIENT)
Dept: PALLIATIVE MEDICINE | Facility: CLINIC | Age: 49
End: 2024-08-27
Payer: MEDICARE

## 2024-08-27 DIAGNOSIS — S73.191D TEAR OF RIGHT ACETABULAR LABRUM, SUBSEQUENT ENCOUNTER: ICD-10-CM

## 2024-08-27 PROCEDURE — 99214 OFFICE O/P EST MOD 30 MIN: CPT | Mod: 95,,, | Performed by: STUDENT IN AN ORGANIZED HEALTH CARE EDUCATION/TRAINING PROGRAM

## 2024-08-27 RX ORDER — OXYCODONE HYDROCHLORIDE 10 MG/1
10 TABLET ORAL EVERY 6 HOURS PRN
Qty: 150 TABLET | Refills: 0 | Status: SHIPPED | OUTPATIENT
Start: 2024-08-27
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
## 2024-10-10 DIAGNOSIS — S73.191D TEAR OF RIGHT ACETABULAR LABRUM, SUBSEQUENT ENCOUNTER: ICD-10-CM

## 2024-10-10 RX ORDER — OXYCODONE HYDROCHLORIDE 10 MG/1
10 TABLET ORAL EVERY 6 HOURS PRN
Qty: 150 TABLET | Refills: 0 | Status: SHIPPED | OUTPATIENT
Start: 2024-10-10

## 2024-11-07 DIAGNOSIS — G47.00 INSOMNIA, UNSPECIFIED TYPE: ICD-10-CM

## 2024-11-08 RX ORDER — RAMELTEON 8 MG/1
8 TABLET ORAL NIGHTLY
Qty: 30 TABLET | Refills: 3 | Status: SHIPPED | OUTPATIENT
Start: 2024-11-08

## 2024-11-11 ENCOUNTER — OFFICE VISIT (OUTPATIENT)
Dept: PALLIATIVE MEDICINE | Facility: CLINIC | Age: 49
End: 2024-11-11
Payer: MEDICARE

## 2024-11-11 DIAGNOSIS — S73.191D TEAR OF RIGHT ACETABULAR LABRUM, SUBSEQUENT ENCOUNTER: ICD-10-CM

## 2024-11-11 PROCEDURE — 99215 OFFICE O/P EST HI 40 MIN: CPT | Mod: 95,,, | Performed by: STUDENT IN AN ORGANIZED HEALTH CARE EDUCATION/TRAINING PROGRAM

## 2024-11-11 RX ORDER — NORTRIPTYLINE HYDROCHLORIDE 25 MG/1
50 CAPSULE ORAL NIGHTLY
Qty: 60 CAPSULE | Refills: 11 | Status: SHIPPED | OUTPATIENT
Start: 2024-11-11 | End: 2025-11-11

## 2024-11-11 RX ORDER — OXYCODONE HYDROCHLORIDE 10 MG/1
10 TABLET ORAL EVERY 6 HOURS PRN
Qty: 150 TABLET | Refills: 0 | Status: SHIPPED | OUTPATIENT
Start: 2024-11-11 | End: 2024-11-14 | Stop reason: SDUPTHER
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
## 2024-11-14 DIAGNOSIS — S73.191D TEAR OF RIGHT ACETABULAR LABRUM, SUBSEQUENT ENCOUNTER: ICD-10-CM

## 2024-11-14 RX ORDER — OXYCODONE HYDROCHLORIDE 10 MG/1
TABLET ORAL
Qty: 150 TABLET | Refills: 0 | Status: SHIPPED | OUTPATIENT
Start: 2024-11-14

## 2024-12-17 DIAGNOSIS — G47.00 INSOMNIA, UNSPECIFIED TYPE: ICD-10-CM

## 2024-12-17 DIAGNOSIS — S73.191D TEAR OF RIGHT ACETABULAR LABRUM, SUBSEQUENT ENCOUNTER: ICD-10-CM

## 2024-12-18 RX ORDER — RAMELTEON 8 MG/1
8 TABLET ORAL NIGHTLY
Qty: 30 TABLET | Refills: 3 | Status: SHIPPED | OUTPATIENT
Start: 2024-12-18

## 2024-12-18 RX ORDER — OXYCODONE HYDROCHLORIDE 10 MG/1
TABLET ORAL
Qty: 150 TABLET | Refills: 0 | Status: SHIPPED | OUTPATIENT
Start: 2024-12-18

## 2025-01-10 ENCOUNTER — OFFICE VISIT (OUTPATIENT)
Dept: PALLIATIVE MEDICINE | Facility: CLINIC | Age: 50
End: 2025-01-10
Payer: MEDICARE

## 2025-01-10 DIAGNOSIS — M25.551 PAIN OF RIGHT HIP: Primary | ICD-10-CM

## 2025-01-10 DIAGNOSIS — S73.191D TEAR OF RIGHT ACETABULAR LABRUM, SUBSEQUENT ENCOUNTER: ICD-10-CM

## 2025-01-10 DIAGNOSIS — G89.21 CHRONIC PAIN DUE TO TRAUMA: ICD-10-CM

## 2025-01-10 RX ORDER — NORTRIPTYLINE HYDROCHLORIDE 50 MG/1
50 CAPSULE ORAL NIGHTLY
Qty: 30 CAPSULE | Refills: 11 | Status: SHIPPED | OUTPATIENT
Start: 2025-01-10 | End: 2026-01-10

## 2025-01-10 RX ORDER — METHOCARBAMOL 500 MG/1
500 TABLET, FILM COATED ORAL EVERY 6 HOURS PRN
Qty: 40 TABLET | Refills: 0 | Status: SHIPPED | OUTPATIENT
Start: 2025-01-10 | End: 2025-01-20

## 2025-01-10 RX ORDER — OXYCODONE HYDROCHLORIDE 10 MG/1
TABLET ORAL
Qty: 150 TABLET | Refills: 0 | Status: SHIPPED | OUTPATIENT
Start: 2025-01-10 | End: 2025-02-03 | Stop reason: SDUPTHER
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
## 2025-02-01 DIAGNOSIS — S73.191D TEAR OF RIGHT ACETABULAR LABRUM, SUBSEQUENT ENCOUNTER: ICD-10-CM

## 2025-02-01 DIAGNOSIS — G47.00 INSOMNIA, UNSPECIFIED TYPE: ICD-10-CM

## 2025-02-03 RX ORDER — RAMELTEON 8 MG/1
8 TABLET ORAL NIGHTLY
Qty: 30 TABLET | Refills: 3 | Status: SHIPPED | OUTPATIENT
Start: 2025-02-03

## 2025-02-03 RX ORDER — OXYCODONE HYDROCHLORIDE 10 MG/1
TABLET ORAL
Qty: 150 TABLET | Refills: 0 | Status: SHIPPED | OUTPATIENT
Start: 2025-02-03 | End: 2025-02-26 | Stop reason: SDUPTHER

## 2025-02-26 DIAGNOSIS — S73.191D TEAR OF RIGHT ACETABULAR LABRUM, SUBSEQUENT ENCOUNTER: ICD-10-CM

## 2025-02-26 RX ORDER — OXYCODONE HYDROCHLORIDE 10 MG/1
TABLET ORAL
Qty: 150 TABLET | Refills: 0 | Status: SHIPPED | OUTPATIENT
Start: 2025-02-26

## 2025-03-20 DIAGNOSIS — S73.191D TEAR OF RIGHT ACETABULAR LABRUM, SUBSEQUENT ENCOUNTER: ICD-10-CM

## 2025-03-20 DIAGNOSIS — G47.00 INSOMNIA, UNSPECIFIED TYPE: ICD-10-CM

## 2025-03-21 ENCOUNTER — PATIENT MESSAGE (OUTPATIENT)
Dept: PALLIATIVE MEDICINE | Facility: CLINIC | Age: 50
End: 2025-03-21
Payer: MEDICARE

## 2025-03-21 RX ORDER — OXYCODONE HYDROCHLORIDE 10 MG/1
TABLET ORAL
Qty: 150 TABLET | Refills: 0 | Status: SHIPPED | OUTPATIENT
Start: 2025-03-21

## 2025-03-21 RX ORDER — RAMELTEON 8 MG/1
8 TABLET ORAL NIGHTLY
Qty: 30 TABLET | Refills: 3 | Status: SHIPPED | OUTPATIENT
Start: 2025-03-21

## 2025-04-20 ENCOUNTER — PATIENT MESSAGE (OUTPATIENT)
Dept: PALLIATIVE MEDICINE | Facility: CLINIC | Age: 50
End: 2025-04-20
Payer: MEDICARE

## 2025-04-20 DIAGNOSIS — S73.191D TEAR OF RIGHT ACETABULAR LABRUM, SUBSEQUENT ENCOUNTER: ICD-10-CM

## 2025-04-21 RX ORDER — OXYCODONE HYDROCHLORIDE 10 MG/1
TABLET ORAL
Qty: 150 TABLET | Refills: 0 | Status: SHIPPED | OUTPATIENT
Start: 2025-04-21

## 2025-05-01 ENCOUNTER — TELEPHONE (OUTPATIENT)
Dept: PALLIATIVE MEDICINE | Facility: CLINIC | Age: 50
End: 2025-05-01
Payer: MEDICARE

## 2025-05-06 ENCOUNTER — OFFICE VISIT (OUTPATIENT)
Dept: PALLIATIVE MEDICINE | Facility: CLINIC | Age: 50
End: 2025-05-06
Payer: MEDICARE

## 2025-05-06 VITALS
BODY MASS INDEX: 30.15 KG/M2 | DIASTOLIC BLOOD PRESSURE: 94 MMHG | SYSTOLIC BLOOD PRESSURE: 136 MMHG | WEIGHT: 210.13 LBS | HEART RATE: 104 BPM

## 2025-05-06 DIAGNOSIS — F11.90 OPIOID USE: ICD-10-CM

## 2025-05-06 DIAGNOSIS — S73.191D TEAR OF RIGHT ACETABULAR LABRUM, SUBSEQUENT ENCOUNTER: Primary | ICD-10-CM

## 2025-05-06 DIAGNOSIS — F43.23 ADJUSTMENT DISORDER WITH MIXED ANXIETY AND DEPRESSED MOOD: ICD-10-CM

## 2025-05-06 DIAGNOSIS — G47.00 INSOMNIA, UNSPECIFIED TYPE: ICD-10-CM

## 2025-05-06 DIAGNOSIS — G89.21 CHRONIC PAIN DUE TO TRAUMA: ICD-10-CM

## 2025-05-06 DIAGNOSIS — M25.551 PAIN OF RIGHT HIP: ICD-10-CM

## 2025-05-06 PROCEDURE — 99999 PR PBB SHADOW E&M-EST. PATIENT-LVL III: CPT | Mod: PBBFAC,,, | Performed by: STUDENT IN AN ORGANIZED HEALTH CARE EDUCATION/TRAINING PROGRAM

## 2025-05-06 RX ORDER — DICLOFENAC SODIUM 50 MG/1
50 TABLET, DELAYED RELEASE ORAL 3 TIMES DAILY
COMMUNITY
Start: 2025-04-28
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
## 2025-05-16 DIAGNOSIS — S73.191D TEAR OF RIGHT ACETABULAR LABRUM, SUBSEQUENT ENCOUNTER: ICD-10-CM

## 2025-05-16 DIAGNOSIS — G47.00 INSOMNIA, UNSPECIFIED TYPE: ICD-10-CM

## 2025-05-16 RX ORDER — OXYCODONE HYDROCHLORIDE 10 MG/1
TABLET ORAL
Qty: 150 TABLET | Refills: 0 | Status: SHIPPED | OUTPATIENT
Start: 2025-05-16

## 2025-05-16 RX ORDER — RAMELTEON 8 MG/1
8 TABLET ORAL NIGHTLY
Qty: 30 TABLET | Refills: 3 | Status: SHIPPED | OUTPATIENT
Start: 2025-05-16

## 2025-06-10 DIAGNOSIS — S73.191D TEAR OF RIGHT ACETABULAR LABRUM, SUBSEQUENT ENCOUNTER: ICD-10-CM

## 2025-06-11 ENCOUNTER — PATIENT MESSAGE (OUTPATIENT)
Dept: PALLIATIVE MEDICINE | Facility: CLINIC | Age: 50
End: 2025-06-11
Payer: MEDICARE

## 2025-06-11 RX ORDER — OXYCODONE HYDROCHLORIDE 10 MG/1
TABLET ORAL
Qty: 150 TABLET | Refills: 0 | Status: SHIPPED | OUTPATIENT
Start: 2025-06-11

## 2025-06-13 ENCOUNTER — TELEPHONE (OUTPATIENT)
Dept: PALLIATIVE MEDICINE | Facility: CLINIC | Age: 50
End: 2025-06-13
Payer: MEDICARE

## 2025-07-11 DIAGNOSIS — S73.191D TEAR OF RIGHT ACETABULAR LABRUM, SUBSEQUENT ENCOUNTER: ICD-10-CM

## 2025-07-11 RX ORDER — OXYCODONE HYDROCHLORIDE 10 MG/1
TABLET ORAL
Qty: 150 TABLET | Refills: 0 | Status: SHIPPED | OUTPATIENT
Start: 2025-07-11

## 2025-07-22 ENCOUNTER — OFFICE VISIT (OUTPATIENT)
Dept: PALLIATIVE MEDICINE | Facility: CLINIC | Age: 50
End: 2025-07-22
Payer: MEDICARE

## 2025-07-22 VITALS — DIASTOLIC BLOOD PRESSURE: 101 MMHG | OXYGEN SATURATION: 96 % | HEART RATE: 104 BPM | SYSTOLIC BLOOD PRESSURE: 142 MMHG

## 2025-07-22 DIAGNOSIS — S73.191D TEAR OF RIGHT ACETABULAR LABRUM, SUBSEQUENT ENCOUNTER: ICD-10-CM

## 2025-07-22 PROCEDURE — 1159F MED LIST DOCD IN RCRD: CPT | Mod: CPTII,S$GLB,, | Performed by: STUDENT IN AN ORGANIZED HEALTH CARE EDUCATION/TRAINING PROGRAM

## 2025-07-22 PROCEDURE — 4010F ACE/ARB THERAPY RXD/TAKEN: CPT | Mod: CPTII,S$GLB,, | Performed by: STUDENT IN AN ORGANIZED HEALTH CARE EDUCATION/TRAINING PROGRAM

## 2025-07-22 PROCEDURE — 99215 OFFICE O/P EST HI 40 MIN: CPT | Mod: S$GLB,,, | Performed by: STUDENT IN AN ORGANIZED HEALTH CARE EDUCATION/TRAINING PROGRAM

## 2025-07-22 PROCEDURE — 99999 PR PBB SHADOW E&M-EST. PATIENT-LVL II: CPT | Mod: PBBFAC,,, | Performed by: STUDENT IN AN ORGANIZED HEALTH CARE EDUCATION/TRAINING PROGRAM

## 2025-07-22 PROCEDURE — 3080F DIAST BP >= 90 MM HG: CPT | Mod: CPTII,S$GLB,, | Performed by: STUDENT IN AN ORGANIZED HEALTH CARE EDUCATION/TRAINING PROGRAM

## 2025-07-22 PROCEDURE — 3077F SYST BP >= 140 MM HG: CPT | Mod: CPTII,S$GLB,, | Performed by: STUDENT IN AN ORGANIZED HEALTH CARE EDUCATION/TRAINING PROGRAM

## 2025-07-22 RX ORDER — OXYCODONE HYDROCHLORIDE 10 MG/1
TABLET ORAL
Qty: 150 TABLET | Refills: 0 | Status: SHIPPED | OUTPATIENT
Start: 2025-08-01
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
## 2025-08-15 DIAGNOSIS — S73.191D TEAR OF RIGHT ACETABULAR LABRUM, SUBSEQUENT ENCOUNTER: ICD-10-CM

## 2025-08-15 RX ORDER — OXYCODONE HYDROCHLORIDE 10 MG/1
TABLET ORAL
Qty: 150 TABLET | Refills: 0 | Status: SHIPPED | OUTPATIENT
Start: 2025-08-15

## 2025-09-03 DIAGNOSIS — S73.191D TEAR OF RIGHT ACETABULAR LABRUM, SUBSEQUENT ENCOUNTER: ICD-10-CM

## 2025-09-04 RX ORDER — OXYCODONE HYDROCHLORIDE 10 MG/1
TABLET ORAL
Qty: 150 TABLET | Refills: 0 | Status: SHIPPED | OUTPATIENT
Start: 2025-09-04

## (undated) DEVICE — TUBE FRAZIER 5MM 2FT SOFT TIP

## (undated) DEVICE — SEE MEDLINE ITEM 157131

## (undated) DEVICE — DRAPE OPMI STERILE

## (undated) DEVICE — HEMOSTAT SURGICEL 4X8IN

## (undated) DEVICE — DRESSING MEPILEX FLEX 4X4IN

## (undated) DEVICE — BLADE ELECTRO EDGE INSULATED

## (undated) DEVICE — DRESSING TRANS 8X12 TEGADERM

## (undated) DEVICE — NDL SPINAL 18GX3.5 SPINOCAN

## (undated) DEVICE — BIT DRILL REAM GPS 3.5MM

## (undated) DEVICE — SUT D SPECIAL VICRYL 2-0

## (undated) DEVICE — GAUZE SPONGE 4X4 12PLY

## (undated) DEVICE — KIT ACCESS DILATOR SET PROBE

## (undated) DEVICE — BUR BONE CUT MICRO TPS 3X3.8MM

## (undated) DEVICE — CORD BIPOLAR 12 FOOT

## (undated) DEVICE — DRAPE INCISE IOBAN 2 23X17IN

## (undated) DEVICE — DRESSING LEUKOPLAST FLEX 1X3IN

## (undated) DEVICE — SEE MEDLINE ITEM 156905

## (undated) DEVICE — DRESSING SURGICAL 1/2X1/2

## (undated) DEVICE — GUIDE POST LP QUATTRO MEDIUM

## (undated) DEVICE — KIT SURGIFLO HEMOSTATIC MATRIX

## (undated) DEVICE — SPONGE NEURO 1/4X1/4

## (undated) DEVICE — DRAPE C-ARMOR EQUIPMENT COVER

## (undated) DEVICE — SUT MCRYL PLUS 4-0 PS2 27IN

## (undated) DEVICE — CARTRIDGE OIL

## (undated) DEVICE — DURAPREP SURG SCRUB 26ML

## (undated) DEVICE — DIFFUSER

## (undated) DEVICE — DRAPE C ARM 42 X 120 10/BX

## (undated) DEVICE — MARKER SKIN STND TIP BLUE BARR

## (undated) DEVICE — TRAY FOLEY 16FR INFECTION CONT

## (undated) DEVICE — ELECTRODE REM PLYHSV RETURN 9

## (undated) DEVICE — DRAPE STERI INSTRUMENT 1018

## (undated) DEVICE — DRESSING AQUACEL AG ADV 3.5X6

## (undated) DEVICE — DRAPE ABDOMINAL TIBURON 14X11

## (undated) DEVICE — Device

## (undated) DEVICE — GUIDE POST LP QUATTR SPIKE MED

## (undated) DEVICE — DRILL BIT SURG GPS HI SPD 4MM

## (undated) DEVICE — KIT SPINAL PATIENT CARE JACK